# Patient Record
Sex: FEMALE | Race: WHITE | NOT HISPANIC OR LATINO | Employment: OTHER | URBAN - METROPOLITAN AREA
[De-identification: names, ages, dates, MRNs, and addresses within clinical notes are randomized per-mention and may not be internally consistent; named-entity substitution may affect disease eponyms.]

---

## 2017-01-19 RX ORDER — BUSPIRONE HYDROCHLORIDE 7.5 MG/1
TABLET ORAL
Qty: 60 TABLET | Refills: 6 | Status: SHIPPED | OUTPATIENT
Start: 2017-01-19 | End: 2017-11-05 | Stop reason: SDUPTHER

## 2017-02-16 ENCOUNTER — OFFICE VISIT (OUTPATIENT)
Dept: FAMILY MEDICINE CLINIC | Facility: CLINIC | Age: 57
End: 2017-02-16

## 2017-02-16 VITALS
DIASTOLIC BLOOD PRESSURE: 76 MMHG | HEIGHT: 60 IN | SYSTOLIC BLOOD PRESSURE: 144 MMHG | WEIGHT: 150 LBS | HEART RATE: 98 BPM | TEMPERATURE: 98.1 F | BODY MASS INDEX: 29.45 KG/M2 | OXYGEN SATURATION: 98 %

## 2017-02-16 DIAGNOSIS — E78.5 DYSLIPIDEMIA: ICD-10-CM

## 2017-02-16 DIAGNOSIS — I10 BENIGN ESSENTIAL HYPERTENSION: Primary | ICD-10-CM

## 2017-02-16 DIAGNOSIS — F41.9 ANXIETY: ICD-10-CM

## 2017-02-16 DIAGNOSIS — F41.0 PANIC DISORDER WITHOUT AGORAPHOBIA: ICD-10-CM

## 2017-02-16 PROCEDURE — 99214 OFFICE O/P EST MOD 30 MIN: CPT | Performed by: FAMILY MEDICINE

## 2017-02-16 NOTE — PROGRESS NOTES
Marek Park is a 56 y.o. female.   Chief Complaint   Patient presents with   • Follow-up     Patient states her ears keep popping    • Hypertension       Hypertension   This is a chronic problem. The current episode started more than 1 year ago. The problem is unchanged. The problem is controlled. Associated symptoms include anxiety. Pertinent negatives include no chest pain, headaches, orthopnea, palpitations, peripheral edema, PND or shortness of breath. There are no associated agents to hypertension. Risk factors for coronary artery disease include dyslipidemia. Past treatments include ACE inhibitors. The current treatment provides moderate improvement. There are no compliance problems.    Anxiety   Presents for follow-up visit. Symptoms include nervous/anxious behavior and panic. Patient reports no chest pain, dizziness, nausea, palpitations or shortness of breath. Symptoms occur occasionally. The severity of symptoms is mild. The quality of sleep is poor (Taking effexor at night). Nighttime awakenings: occasional.     Compliance with medications is %.   Hyperlipidemia   This is a chronic problem. The current episode started more than 1 year ago. The problem is controlled. Recent lipid tests were reviewed and are normal. There are no known factors aggravating her hyperlipidemia. Pertinent negatives include no chest pain, focal sensory loss, focal weakness, leg pain, myalgias or shortness of breath. She is currently on no antihyperlipidemic treatment. Improvement on treatment: due for recheck. There are no compliance problems.         The following portions of the patient's history were reviewed and updated as appropriate: allergies, current medications, past medical history and problem list.    Review of Systems   Constitutional: Unexpected weight change: Weight gain or loss.   Respiratory: Negative for shortness of breath and wheezing.    Cardiovascular: Negative for chest pain, palpitations,  "orthopnea, leg swelling and PND.        Had reproducible chest pain for 3 days few weeks ago, but resolved and has not recurred.   Gastrointestinal: Negative for abdominal pain, nausea and vomiting.   Musculoskeletal: Negative for myalgias.   Neurological: Negative for dizziness, tremors, focal weakness, syncope, facial asymmetry, speech difficulty, weakness, light-headedness, numbness and headaches.   Psychiatric/Behavioral: The patient is nervous/anxious.        Objective    Vitals:    02/16/17 1052   BP: 144/76   Pulse: 98   Temp: 98.1 °F (36.7 °C)   TempSrc: Oral   SpO2: 98%   Weight: 150 lb (68 kg)   Height: 60\" (152.4 cm)       Physical Exam   Constitutional: She appears well-developed and well-nourished.   HENT:   Head: Normocephalic and atraumatic.   Neck: Neck supple. No JVD present. No thyromegaly present.   Cardiovascular: Normal rate, regular rhythm and normal heart sounds.  Exam reveals no gallop and no friction rub.    No murmur heard.  Pulmonary/Chest: Effort normal and breath sounds normal. No respiratory distress. She has no wheezes. She has no rales.   Abdominal: Soft. Bowel sounds are normal. She exhibits no distension. There is no tenderness. There is no rebound and no guarding.   Musculoskeletal: She exhibits no edema.   Neurological: She is alert.   Skin: Skin is warm and dry.   Psychiatric: She has a normal mood and affect. Her behavior is normal.   Nursing note and vitals reviewed.      Assessment/Plan   Marizol was seen today for follow-up and hypertension.    Diagnoses and all orders for this visit:    Benign essential hypertension  -     Comprehensive Metabolic Panel    Dyslipidemia  -     Lipid Panel  -     Comprehensive Metabolic Panel    Anxiety    Panic disorder without agoraphobia    -hypertension - controlled, continue medications  -hld - recheck lipids  -try taking effexor in am        Current Outpatient Prescriptions:   •  busPIRone (BUSPAR) 7.5 MG tablet, TAKE 1 TABLET BY MOUTH TWICE " DAILY, Disp: 60 tablet, Rfl: 6  •  ibuprofen (ADVIL,MOTRIN) 600 MG tablet, Take 1 tablet by mouth every 6 (six) hours as needed for mild pain (1-3)., Disp: 90 tablet, Rfl: 1  •  lisinopril (PRINIVIL,ZESTRIL) 20 MG tablet, TAKE 1 TABLET DAILY FOR    BLOOD PRESSURE, Disp: 90 tablet, Rfl: 3  •  mesalamine (LIALDA) 1.2 G EC tablet, Take 4 tablets by mouth daily with dinner., Disp: , Rfl:   •  Nutritional Supplements (EQUATE PO), Take 1 tablet by mouth Daily., Disp: , Rfl:   •  omeprazole (priLOSEC) 40 MG capsule, Take 1 capsule by mouth 2 (Two) Times a Day., Disp: 180 capsule, Rfl: 1  •  venlafaxine XR (EFFEXOR-XR) 150 MG 24 hr capsule, Take 1 capsule by mouth Daily., Disp: 90 capsule, Rfl: 3  •  baclofen (LIORESAL) 10 MG tablet, TK 1 T PO Q 6 HOURS PRN FOR SPASMS, Disp: , Rfl: 0  •  budesonide EC (ENTOCORT EC) 3 MG 24 hr capsule, Take by mouth daily., Disp: , Rfl:   •  clotrimazole-betamethasone (LOTRISONE) 1-0.05 % cream, Apply  topically 2 (two) times a day., Disp: 45 g, Rfl: 1  •  Loratadine 10 MG capsule, Take by mouth., Disp: , Rfl:

## 2017-02-17 LAB
ALBUMIN SERPL-MCNC: 3.8 G/DL (ref 3.5–5.2)
ALBUMIN/GLOB SERPL: 1.3 G/DL
ALP SERPL-CCNC: 97 U/L (ref 39–117)
ALT SERPL-CCNC: 42 U/L (ref 1–33)
AST SERPL-CCNC: 38 U/L (ref 1–32)
BILIRUB SERPL-MCNC: 0.3 MG/DL (ref 0.1–1.2)
BUN SERPL-MCNC: 12 MG/DL (ref 6–20)
BUN/CREAT SERPL: 17.4 (ref 7–25)
CALCIUM SERPL-MCNC: 9.7 MG/DL (ref 8.6–10.5)
CHLORIDE SERPL-SCNC: 102 MMOL/L (ref 98–107)
CHOLEST SERPL-MCNC: 309 MG/DL (ref 0–200)
CO2 SERPL-SCNC: 26.2 MMOL/L (ref 22–29)
CREAT SERPL-MCNC: 0.69 MG/DL (ref 0.57–1)
GLOBULIN SER CALC-MCNC: 3 GM/DL
GLUCOSE SERPL-MCNC: 105 MG/DL (ref 65–99)
HDLC SERPL-MCNC: 35 MG/DL (ref 40–60)
LDLC SERPL CALC-MCNC: 206 MG/DL (ref 0–100)
POTASSIUM SERPL-SCNC: 4.2 MMOL/L (ref 3.5–5.2)
PROT SERPL-MCNC: 6.8 G/DL (ref 6–8.5)
SODIUM SERPL-SCNC: 141 MMOL/L (ref 136–145)
TRIGL SERPL-MCNC: 339 MG/DL (ref 0–150)
VLDLC SERPL CALC-MCNC: 67.8 MG/DL (ref 5–40)

## 2017-02-19 NOTE — PROGRESS NOTES
Please call the patient regarding her abnormal result.  Cholesterol very high, she should start cholesterol medication to prevent stroke and heart attack.  Has she tried atorvastatin?  I would suggest 80mg a day.  Rest of labs stable or normal.  Liver enzymes listed as high, but very close to normal.  Just need to monitor over time.

## 2017-02-21 ENCOUNTER — TELEPHONE (OUTPATIENT)
Dept: FAMILY MEDICINE CLINIC | Facility: CLINIC | Age: 57
End: 2017-02-21

## 2017-02-21 RX ORDER — ATORVASTATIN CALCIUM 80 MG/1
80 TABLET, FILM COATED ORAL DAILY
Qty: 1 TABLET | Refills: 0
Start: 2017-02-21 | End: 2017-03-27

## 2017-02-21 NOTE — PROGRESS NOTES
Pt informed of results. She said she already has rx of atorvastatin, and will start taking it again.

## 2017-03-27 ENCOUNTER — OFFICE VISIT (OUTPATIENT)
Dept: FAMILY MEDICINE CLINIC | Facility: CLINIC | Age: 57
End: 2017-03-27

## 2017-03-27 VITALS
HEART RATE: 93 BPM | DIASTOLIC BLOOD PRESSURE: 64 MMHG | SYSTOLIC BLOOD PRESSURE: 128 MMHG | WEIGHT: 141 LBS | HEIGHT: 60 IN | TEMPERATURE: 98.2 F | BODY MASS INDEX: 27.68 KG/M2 | OXYGEN SATURATION: 99 %

## 2017-03-27 DIAGNOSIS — L02.818 CUTANEOUS ABSCESS OF OTHER SITE: Primary | ICD-10-CM

## 2017-03-27 PROCEDURE — 99213 OFFICE O/P EST LOW 20 MIN: CPT | Performed by: NURSE PRACTITIONER

## 2017-03-27 RX ORDER — DOXYCYCLINE HYCLATE 100 MG/1
100 CAPSULE ORAL 2 TIMES DAILY
Qty: 14 CAPSULE | Refills: 0 | Status: SHIPPED | OUTPATIENT
Start: 2017-03-27 | End: 2017-04-10

## 2017-03-27 NOTE — PROGRESS NOTES
"Marek Park is a 56 y.o. female who presents with an abscess near vagina x 1 week.     History of Present Illness   HAD A CYST IN PAST THAT NEEDED DRAINED, WAS SEEN AT HOSPITAL IN THE PAST. This current cyst has been present for 4 days. Has manipulated heavily, and is much smaller today than yesterday. Sister has had similar in the past.   The following portions of the patient's history were reviewed and updated as appropriate: allergies, current medications, past family history, past medical history, past social history, past surgical history and problem list.    Review of Systems   Constitutional: Negative for chills and fever.   Genitourinary: Positive for genital sores. Negative for decreased urine volume and dysuria.        +fecal and urinary incontinence   Psychiatric/Behavioral: Negative.      /64  Pulse 93  Temp 98.2 °F (36.8 °C) (Oral)   Ht 60\" (152.4 cm)  Wt 141 lb (64 kg)  SpO2 99%  BMI 27.54 kg/m2    Objective   Physical Exam   Abdominal: There is no tenderness.   Genitourinary: There is tenderness and lesion (8mm soft superficial mobile cyst, no head) on the right labia. There is no rash on the right labia. There is no rash, tenderness, lesion or injury on the left labia.   Genitourinary Comments: Moist, wears pad daily   Lymphadenopathy:        Right: No inguinal adenopathy present.        Left: No inguinal adenopathy present.     Assessment/Plan   Problems Addressed this Visit     None      Visit Diagnoses     Cutaneous abscess of other site    -  Primary    Relevant Medications    doxycycline (VIBRAMYCIN) 100 MG capsule        Does have UC, this clear superficial skin abscess, as involuting, will simply start po antibiotics, FU if lesion worsening or not settling 1 week         "

## 2017-04-07 ENCOUNTER — TELEPHONE (OUTPATIENT)
Dept: FAMILY MEDICINE CLINIC | Facility: CLINIC | Age: 57
End: 2017-04-07

## 2017-04-10 ENCOUNTER — OFFICE VISIT (OUTPATIENT)
Dept: FAMILY MEDICINE CLINIC | Facility: CLINIC | Age: 57
End: 2017-04-10

## 2017-04-10 VITALS
DIASTOLIC BLOOD PRESSURE: 64 MMHG | HEIGHT: 60 IN | BODY MASS INDEX: 27.68 KG/M2 | HEART RATE: 104 BPM | OXYGEN SATURATION: 98 % | WEIGHT: 141 LBS | SYSTOLIC BLOOD PRESSURE: 118 MMHG | TEMPERATURE: 97.7 F

## 2017-04-10 DIAGNOSIS — L02.215 CUTANEOUS ABSCESS OF PERINEUM: Primary | ICD-10-CM

## 2017-04-10 PROCEDURE — 99213 OFFICE O/P EST LOW 20 MIN: CPT | Performed by: NURSE PRACTITIONER

## 2017-04-10 RX ORDER — OMEPRAZOLE 40 MG/1
CAPSULE, DELAYED RELEASE ORAL
Qty: 90 CAPSULE | Refills: 3 | Status: SHIPPED | OUTPATIENT
Start: 2017-04-10 | End: 2017-04-14 | Stop reason: SDUPTHER

## 2017-04-10 RX ORDER — SULFAMETHOXAZOLE AND TRIMETHOPRIM 800; 160 MG/1; MG/1
1 TABLET ORAL 2 TIMES DAILY
Qty: 14 TABLET | Refills: 0 | Status: SHIPPED | OUTPATIENT
Start: 2017-04-10 | End: 2017-07-20

## 2017-04-10 NOTE — PROGRESS NOTES
"Marek Park is a 56 y.o. female who presents for a follow up on abscess.     History of Present Illness   Seen 3/27 and Rx 7 days doxycycline. Cyst now improved mildly, but had a head yesterday. Using warm compresses  The following portions of the patient's history were reviewed and updated as appropriate: allergies, current medications, past family history, past medical history, past social history, past surgical history and problem list.    Review of Systems   Constitutional: Negative.    HENT: Negative.    Respiratory: Negative.  Negative for cough and wheezing.    Skin: Positive for wound.   Allergic/Immunologic: Negative for environmental allergies and food allergies.     /64  Pulse 104  Temp 97.7 °F (36.5 °C) (Oral)   Ht 60\" (152.4 cm)  Wt 141 lb (64 kg)  SpO2 98%  BMI 27.54 kg/m2    Objective   Physical Exam   Constitutional: She appears well-developed and well-nourished.   Cardiovascular: Normal rate, regular rhythm and normal heart sounds.    Pulmonary/Chest: Effort normal and breath sounds normal.   Genitourinary:         Genitourinary Comments: 4mm indurated area, no skin changes, no redness, no heat   Skin: Rash noted.     Assessment/Plan   Problems Addressed this Visit     None      Visit Diagnoses     Cutaneous abscess of perineum    -  Primary        Will retreat for infection, lesion tiny, no role for drainage, no obvious history of draining. Continue warm compresses and follow up if not settling 1 week.          "

## 2017-04-14 RX ORDER — OMEPRAZOLE 40 MG/1
40 CAPSULE, DELAYED RELEASE ORAL 2 TIMES DAILY
Qty: 180 CAPSULE | Refills: 1 | Status: SHIPPED | OUTPATIENT
Start: 2017-04-14 | End: 2017-09-26 | Stop reason: SDUPTHER

## 2017-04-24 ENCOUNTER — OFFICE (OUTPATIENT)
Dept: URBAN - METROPOLITAN AREA OTHER 6 | Facility: OTHER | Age: 57
End: 2017-04-24
Payer: COMMERCIAL

## 2017-04-24 VITALS
DIASTOLIC BLOOD PRESSURE: 70 MMHG | HEART RATE: 88 BPM | SYSTOLIC BLOOD PRESSURE: 130 MMHG | WEIGHT: 141 LBS | HEIGHT: 60 IN

## 2017-04-24 DIAGNOSIS — K51.50 LEFT SIDED COLITIS WITHOUT COMPLICATIONS: ICD-10-CM

## 2017-04-24 DIAGNOSIS — Z80.0 FAMILY HISTORY OF MALIGNANT NEOPLASM OF DIGESTIVE ORGANS: ICD-10-CM

## 2017-04-24 PROCEDURE — 99212 OFFICE O/P EST SF 10 MIN: CPT | Performed by: INTERNAL MEDICINE

## 2017-05-05 ENCOUNTER — OFFICE VISIT (OUTPATIENT)
Dept: FAMILY MEDICINE CLINIC | Facility: CLINIC | Age: 57
End: 2017-05-05

## 2017-05-05 VITALS
WEIGHT: 137 LBS | HEIGHT: 60 IN | OXYGEN SATURATION: 95 % | TEMPERATURE: 98.2 F | BODY MASS INDEX: 26.9 KG/M2 | DIASTOLIC BLOOD PRESSURE: 68 MMHG | HEART RATE: 114 BPM | SYSTOLIC BLOOD PRESSURE: 122 MMHG

## 2017-05-05 DIAGNOSIS — R68.83 CHILLS: Primary | ICD-10-CM

## 2017-05-05 DIAGNOSIS — R11.2 NAUSEA AND VOMITING, INTRACTABILITY OF VOMITING NOT SPECIFIED, UNSPECIFIED VOMITING TYPE: ICD-10-CM

## 2017-05-05 LAB
EXPIRATION DATE: NORMAL
FLUAV AG NPH QL: NEGATIVE
FLUBV AG NPH QL: NEGATIVE
INTERNAL CONTROL: NORMAL
Lab: NORMAL

## 2017-05-05 PROCEDURE — 99213 OFFICE O/P EST LOW 20 MIN: CPT | Performed by: NURSE PRACTITIONER

## 2017-05-05 PROCEDURE — 87804 INFLUENZA ASSAY W/OPTIC: CPT | Performed by: NURSE PRACTITIONER

## 2017-05-05 RX ORDER — ONDANSETRON 4 MG/1
4 TABLET, FILM COATED ORAL EVERY 8 HOURS PRN
Qty: 15 TABLET | Refills: 0 | Status: SHIPPED | OUTPATIENT
Start: 2017-05-05 | End: 2017-07-20

## 2017-05-22 ENCOUNTER — TELEPHONE (OUTPATIENT)
Dept: FAMILY MEDICINE CLINIC | Facility: CLINIC | Age: 57
End: 2017-05-22

## 2017-05-24 RX ORDER — VENLAFAXINE HYDROCHLORIDE 150 MG/1
150 TABLET, EXTENDED RELEASE ORAL DAILY
Qty: 30 EACH | Refills: 5 | Status: SHIPPED | OUTPATIENT
Start: 2017-05-24 | End: 2017-08-25 | Stop reason: SDUPTHER

## 2017-07-20 ENCOUNTER — OFFICE VISIT (OUTPATIENT)
Dept: FAMILY MEDICINE CLINIC | Facility: CLINIC | Age: 57
End: 2017-07-20

## 2017-07-20 VITALS
WEIGHT: 144 LBS | DIASTOLIC BLOOD PRESSURE: 78 MMHG | TEMPERATURE: 98.3 F | SYSTOLIC BLOOD PRESSURE: 118 MMHG | BODY MASS INDEX: 28.27 KG/M2 | HEART RATE: 108 BPM | HEIGHT: 60 IN | OXYGEN SATURATION: 98 %

## 2017-07-20 DIAGNOSIS — R00.0 TACHYCARDIA: ICD-10-CM

## 2017-07-20 DIAGNOSIS — E78.5 DYSLIPIDEMIA: ICD-10-CM

## 2017-07-20 DIAGNOSIS — R07.89 ATYPICAL CHEST PAIN: Primary | ICD-10-CM

## 2017-07-20 LAB
ALBUMIN SERPL-MCNC: 4.1 G/DL (ref 3.5–5.2)
ALBUMIN/GLOB SERPL: 1.5 G/DL
ALP SERPL-CCNC: 90 U/L (ref 39–117)
ALT SERPL-CCNC: 26 U/L (ref 1–33)
AST SERPL-CCNC: 19 U/L (ref 1–32)
BILIRUB SERPL-MCNC: <0.2 MG/DL (ref 0.1–1.2)
BUN SERPL-MCNC: 16 MG/DL (ref 6–20)
BUN/CREAT SERPL: 25.4 (ref 7–25)
CALCIUM SERPL-MCNC: 9.5 MG/DL (ref 8.6–10.5)
CHLORIDE SERPL-SCNC: 102 MMOL/L (ref 98–107)
CO2 SERPL-SCNC: 26.5 MMOL/L (ref 22–29)
CREAT SERPL-MCNC: 0.63 MG/DL (ref 0.57–1)
GLOBULIN SER CALC-MCNC: 2.8 GM/DL
GLUCOSE SERPL-MCNC: 107 MG/DL (ref 65–99)
POTASSIUM SERPL-SCNC: 4.2 MMOL/L (ref 3.5–5.2)
PROT SERPL-MCNC: 6.9 G/DL (ref 6–8.5)
SODIUM SERPL-SCNC: 143 MMOL/L (ref 136–145)
TSH SERPL DL<=0.005 MIU/L-ACNC: 1.13 MIU/ML (ref 0.27–4.2)

## 2017-07-20 PROCEDURE — 93000 ELECTROCARDIOGRAM COMPLETE: CPT | Performed by: NURSE PRACTITIONER

## 2017-07-20 PROCEDURE — 99214 OFFICE O/P EST MOD 30 MIN: CPT | Performed by: NURSE PRACTITIONER

## 2017-07-20 RX ORDER — ROSUVASTATIN CALCIUM 20 MG/1
20 TABLET, COATED ORAL DAILY
Qty: 30 TABLET | Refills: 5 | Status: ON HOLD | OUTPATIENT
Start: 2017-07-20 | End: 2017-12-12 | Stop reason: SINTOL

## 2017-07-20 NOTE — PROGRESS NOTES
Marek Park is a 56 y.o. female who presents for a follow up on three recent episodes of chest pain. First two episodes were resolved after taking tums, third lasted longer than usual. Also c/o night sweats. Recently weaned off prednisone.     Chest Pain    This is a recurrent problem. The current episode started 1 to 4 weeks ago. The onset quality is sudden. The problem occurs intermittently. The problem has been unchanged. The pain is present in the substernal region. The pain is at a severity of 6/10. The quality of the pain is described as squeezing. The pain does not radiate. Pertinent negatives include no abdominal pain, exertional chest pressure, fever, irregular heartbeat, lower extremity edema, nausea, near-syncope, palpitations, shortness of breath or vomiting. The pain is aggravated by nothing. She has tried nothing for the symptoms.   Pertinent negatives for family medical history include: no CAD. Prior diagnostic workup includes echocardiogram and stress thallium.      Episodes occurred after dinner, mid sternal squeezing, water makes it worse, then resolves. TUMS will allow to resolve as well. Last episode was longer, lasted 1 minute. Last episode occurred 4 days ago, none since. Episodes not associated with activity or SOA, No arm pain or jaw pain associated with chest pain. Normally no heartburn. Not currently on a statin, though told not to worry as on too many other medications. (total >300)    Off prednisone x 5 days. Weaned by GI due to recent flare of UC, now settled.     Did increase effexor about 6 weeks ago, did not help with chronic sweating.   The following portions of the patient's history were reviewed and updated as appropriate: allergies, current medications, past family history, past medical history, past social history, past surgical history and problem list.    Review of Systems   Constitutional: Negative for fever.   HENT: Negative.    Eyes: Negative.    Respiratory:  "Negative for shortness of breath.    Cardiovascular: Positive for chest pain. Negative for palpitations, leg swelling and near-syncope.   Gastrointestinal: Positive for diarrhea. Negative for abdominal pain, blood in stool, nausea and vomiting.   Endocrine: Positive for heat intolerance. Negative for cold intolerance, polydipsia, polyphagia and polyuria.   Musculoskeletal: Negative.    Skin: Negative.    Neurological: Negative.  Negative for light-headedness.   Psychiatric/Behavioral: The patient is nervous/anxious.      /78  Pulse 108  Temp 98.3 °F (36.8 °C) (Oral)   Ht 60\" (152.4 cm)  Wt 144 lb (65.3 kg)  SpO2 98%  BMI 28.12 kg/m2    Objective     Physical Exam   Constitutional: She appears well-developed and well-nourished.   Neck: Thyromegaly present.   Cardiovascular: Normal rate, regular rhythm and normal heart sounds.    No murmur heard.  No edema   Pulmonary/Chest: Effort normal and breath sounds normal.   Abdominal: Soft.   Lymphadenopathy:     She has no cervical adenopathy.   Skin: Skin is warm and dry.   Psychiatric: Her speech is normal and behavior is normal. Her mood appears anxious.   Nursing note and vitals reviewed.    Assessment/Plan   Problems Addressed this Visit     None      Visit Diagnoses     Atypical chest pain    -  Primary    Relevant Orders    Comprehensive Metabolic Panel    TSH    Ambulatory Referral to Cardiology    Tachycardia        Relevant Orders    TSH        Last workup in 2015, now with EKG changes, recommend repeat cardiology workup. As no current chest pain, no ischemia on EKG, reasonable for outpt workup. Recommend starting high potency statin for very high cholesterol levels. Consider US thyroid. FU pending cardiology workup and 1 month.     Consider other nonhormonal options for hot flashes.      ECG 12 Lead  Date/Time: 7/20/2017 11:44 AM  Performed by: RYDER WIGGINS  Authorized by: RYDER WIGGINS   Comparison: compared with previous ECG from " 6/19/2015  Comparison to previous ECG: New RBBB. Axis changes  Rhythm: sinus tachycardia  Rate: tachycardic  Conduction: right bundle branch block  ST Segments: ST segments normal  T depression: V1, V2 and V3  QRS axis: indeterminate  Q waves: I and aVL  Clinical impression: abnormal ECG

## 2017-07-21 ENCOUNTER — TELEPHONE (OUTPATIENT)
Dept: FAMILY MEDICINE CLINIC | Facility: CLINIC | Age: 57
End: 2017-07-21

## 2017-07-21 NOTE — TELEPHONE ENCOUNTER
----- Message from ELISABETH Garza sent at 7/21/2017  8:12 AM EDT -----  Normal lab results, normal thyroid.

## 2017-07-24 ENCOUNTER — TELEPHONE (OUTPATIENT)
Dept: FAMILY MEDICINE CLINIC | Facility: CLINIC | Age: 57
End: 2017-07-24

## 2017-07-25 NOTE — TELEPHONE ENCOUNTER
PT SAID [facility]STR[facility] IS FAXING OVER LAB ORDER AND SHE WILL COME DO THE URINE SAMPLE WHEN SHE COMES FOR LABS

## 2017-08-14 ENCOUNTER — OFFICE (OUTPATIENT)
Dept: URBAN - METROPOLITAN AREA OTHER 6 | Facility: OTHER | Age: 57
End: 2017-08-14
Payer: COMMERCIAL

## 2017-08-14 VITALS
HEART RATE: 96 BPM | WEIGHT: 142 LBS | DIASTOLIC BLOOD PRESSURE: 80 MMHG | HEIGHT: 60 IN | SYSTOLIC BLOOD PRESSURE: 132 MMHG

## 2017-08-14 DIAGNOSIS — K51.50 LEFT SIDED COLITIS WITHOUT COMPLICATIONS: ICD-10-CM

## 2017-08-14 DIAGNOSIS — Z80.0 FAMILY HISTORY OF MALIGNANT NEOPLASM OF DIGESTIVE ORGANS: ICD-10-CM

## 2017-08-14 PROCEDURE — 99212 OFFICE O/P EST SF 10 MIN: CPT | Performed by: INTERNAL MEDICINE

## 2017-08-17 ENCOUNTER — OFFICE VISIT (OUTPATIENT)
Dept: FAMILY MEDICINE CLINIC | Facility: CLINIC | Age: 57
End: 2017-08-17

## 2017-08-17 VITALS
HEART RATE: 89 BPM | OXYGEN SATURATION: 98 % | DIASTOLIC BLOOD PRESSURE: 80 MMHG | SYSTOLIC BLOOD PRESSURE: 128 MMHG | WEIGHT: 141 LBS | TEMPERATURE: 97.6 F | HEIGHT: 60 IN | BODY MASS INDEX: 27.68 KG/M2

## 2017-08-17 DIAGNOSIS — D64.9 ANEMIA, UNSPECIFIED TYPE: ICD-10-CM

## 2017-08-17 DIAGNOSIS — R41.3 MEMORY LOSS: ICD-10-CM

## 2017-08-17 DIAGNOSIS — E78.5 DYSLIPIDEMIA: ICD-10-CM

## 2017-08-17 DIAGNOSIS — R53.83 OTHER FATIGUE: ICD-10-CM

## 2017-08-17 DIAGNOSIS — I10 BENIGN ESSENTIAL HYPERTENSION: Primary | ICD-10-CM

## 2017-08-17 LAB
BASOPHILS # BLD AUTO: 0.02 10*3/MM3 (ref 0–0.2)
BASOPHILS NFR BLD AUTO: 0.3 % (ref 0–1.5)
BUN SERPL-MCNC: 16 MG/DL (ref 6–20)
BUN/CREAT SERPL: 23.5 (ref 7–25)
CALCIUM SERPL-MCNC: 9.6 MG/DL (ref 8.6–10.5)
CHLORIDE SERPL-SCNC: 105 MMOL/L (ref 98–107)
CHOLEST SERPL-MCNC: 234 MG/DL (ref 0–200)
CO2 SERPL-SCNC: 25.7 MMOL/L (ref 22–29)
CREAT SERPL-MCNC: 0.68 MG/DL (ref 0.57–1)
EOSINOPHIL # BLD AUTO: 0.2 10*3/MM3 (ref 0–0.7)
EOSINOPHIL NFR BLD AUTO: 2.7 % (ref 0.3–6.2)
ERYTHROCYTE [DISTWIDTH] IN BLOOD BY AUTOMATED COUNT: 16.5 % (ref 11.7–13)
FERRITIN SERPL-MCNC: 7.14 NG/ML (ref 13–150)
FOLATE SERPL-MCNC: >20 NG/ML (ref 4.78–24.2)
GLUCOSE SERPL-MCNC: 76 MG/DL (ref 65–99)
HCT VFR BLD AUTO: 36.6 % (ref 35.6–45.5)
HDLC SERPL-MCNC: 41 MG/DL (ref 40–60)
HGB BLD-MCNC: 10.1 G/DL (ref 11.9–15.5)
IMM GRANULOCYTES # BLD: 0 10*3/MM3 (ref 0–0.03)
IMM GRANULOCYTES NFR BLD: 0 % (ref 0–0.5)
IRON SATN MFR SERPL: 7 % (ref 20–50)
IRON SERPL-MCNC: 31 MCG/DL (ref 37–145)
LDLC SERPL CALC-MCNC: 119 MG/DL (ref 0–100)
LYMPHOCYTES # BLD AUTO: 2.25 10*3/MM3 (ref 0.9–4.8)
LYMPHOCYTES NFR BLD AUTO: 30.9 % (ref 19.6–45.3)
MCH RBC QN AUTO: 22.2 PG (ref 26.9–32)
MCHC RBC AUTO-ENTMCNC: 27.6 G/DL (ref 32.4–36.3)
MCV RBC AUTO: 80.4 FL (ref 80.5–98.2)
MONOCYTES # BLD AUTO: 0.69 10*3/MM3 (ref 0.2–1.2)
MONOCYTES NFR BLD AUTO: 9.5 % (ref 5–12)
NEUTROPHILS # BLD AUTO: 4.12 10*3/MM3 (ref 1.9–8.1)
NEUTROPHILS NFR BLD AUTO: 56.6 % (ref 42.7–76)
PLATELET # BLD AUTO: 387 10*3/MM3 (ref 140–500)
POTASSIUM SERPL-SCNC: 4.5 MMOL/L (ref 3.5–5.2)
RBC # BLD AUTO: 4.55 10*6/MM3 (ref 3.9–5.2)
SODIUM SERPL-SCNC: 144 MMOL/L (ref 136–145)
TIBC SERPL-MCNC: 414 MCG/DL
TRIGL SERPL-MCNC: 368 MG/DL (ref 0–150)
UIBC SERPL-MCNC: 383 MCG/DL
VIT B12 SERPL-MCNC: 604 PG/ML (ref 211–946)
VLDLC SERPL CALC-MCNC: 73.6 MG/DL (ref 5–40)
WBC # BLD AUTO: 7.28 10*3/MM3 (ref 4.5–10.7)

## 2017-08-17 PROCEDURE — 99214 OFFICE O/P EST MOD 30 MIN: CPT | Performed by: FAMILY MEDICINE

## 2017-08-17 NOTE — PROGRESS NOTES
Subjective   Marizol Park is a 56 y.o. female. Presents today for   Chief Complaint   Patient presents with   • Hypertension     pt here for 6 month med review and labs.    • Memory Loss     pt has been experiencing memory loss, she would like referral to psych       Hypertension   This is a chronic problem. The current episode started more than 1 year ago. The problem is unchanged. The problem is controlled. Associated symptoms include malaise/fatigue. Pertinent negatives include no chest pain, orthopnea, palpitations, peripheral edema, PND or shortness of breath. There are no associated agents to hypertension. Risk factors for coronary artery disease include dyslipidemia and post-menopausal state. Past treatments include ACE inhibitors. The current treatment provides moderate improvement. There is no history of CAD/MI or CVA.   Memory Loss   This is a chronic problem. The current episode started more than 1 month ago. The problem occurs constantly. The problem has been unchanged. Pertinent negatives include no abdominal pain, chest pain, chills, fever, nausea or vomiting. Associated symptoms comments: Losing items and doesn't remember where placed;  Is under a lot of social stress.    Had labs with jesenia, cmp normal, TSH normal.  CBC noted anemia (new from last check).  Patient hx of UC.. The symptoms are aggravated by stress. She has tried nothing for the symptoms. The treatment provided no relief.   Hyperlipidemia   This is a chronic problem. The current episode started more than 1 year ago. The problem is uncontrolled. Recent lipid tests were reviewed and are high. There are no known factors aggravating her hyperlipidemia. Pertinent negatives include no chest pain, focal sensory loss, leg pain or shortness of breath. Current antihyperlipidemic treatment includes statins (Last check LDL 200s;  had changed to Crestor.). Improvement on treatment: Due for recheck. Risk factors for coronary artery disease include  dyslipidemia, hypertension and post-menopausal.   Anemia   Presents for follow-up visit. Symptoms include malaise/fatigue. There has been no abdominal pain, bruising/bleeding easily, fever, light-headedness or palpitations. (Has had bloody diarrhea in past with UC.  ) Signs of blood loss that are present include hematochezia (Had 5 days, reports forgot lialda prior to starting;  On now consistently and stopped.). Signs of blood loss that are not present include melena. There are no compliance problems.  Compliance with medications is %.       Review of Systems   Constitutional: Positive for malaise/fatigue. Negative for chills and fever.   Respiratory: Negative for shortness of breath.    Cardiovascular: Negative for chest pain, palpitations, orthopnea and PND.   Gastrointestinal: Positive for anal bleeding, blood in stool and hematochezia (Had 5 days, reports forgot lialda prior to starting;  On now consistently and stopped.). Negative for abdominal pain, diarrhea, melena, nausea and vomiting.   Neurological: Negative for dizziness, syncope and light-headedness.   Hematological: Does not bruise/bleed easily.   Psychiatric/Behavioral: Positive for sleep disturbance.       The following portions of the patient's history were reviewed and updated as appropriate: allergies, current medications, past medical history and problem list.    Patient Active Problem List   Diagnosis   • Acute post-traumatic stress disorder   • Anxiety   • Benign essential hypertension   • Excessive cerumen in ear canal   • Chest wall pain   • Cough   • Dyslipidemia   • External hemorrhoids   • Fracture, thoracic vertebra, compression   • Gastroesophageal reflux disease   • Grief   • Hemorrhoids   • Night sweats   • Panic disorder without agoraphobia   • Peripheral neuropathy   • Skin lesion   • Ulcerative colitis   • Ulnar neuropathy       No Known Allergies    Current Outpatient Prescriptions on File Prior to Visit   Medication Sig  "Dispense Refill   • busPIRone (BUSPAR) 7.5 MG tablet TAKE 1 TABLET BY MOUTH TWICE DAILY 60 tablet 6   • lisinopril (PRINIVIL,ZESTRIL) 20 MG tablet TAKE 1 TABLET DAILY FOR    BLOOD PRESSURE 90 tablet 3   • Loratadine 10 MG capsule Take 10 mg by mouth Daily.     • mesalamine (LIALDA) 1.2 G EC tablet Take 4 tablets by mouth daily with dinner.     • omeprazole (priLOSEC) 40 MG capsule Take 1 capsule by mouth 2 (Two) Times a Day. 180 capsule 1   • rosuvastatin (CRESTOR) 20 MG tablet Take 1 tablet by mouth Daily. 30 tablet 5   • venlafaxine (EFFEXOR) 150 MG tablet sustained-release 24 hour 24 hr tablet Take 1 tablet by mouth Daily. 30 each 5   • [DISCONTINUED] ibuprofen (ADVIL,MOTRIN) 600 MG tablet Take 1 tablet by mouth every 6 (six) hours as needed for mild pain (1-3). 90 tablet 1     No current facility-administered medications on file prior to visit.        Objective   Vitals:    08/17/17 1059   BP: 128/80   BP Location: Left arm   Patient Position: Sitting   Cuff Size: Adult   Pulse: 89   Temp: 97.6 °F (36.4 °C)   TempSrc: Oral   SpO2: 98%   Weight: 141 lb (64 kg)   Height: 60\" (152.4 cm)       Physical Exam   Constitutional: She appears well-developed and well-nourished.   HENT:   Head: Normocephalic and atraumatic.   Neck: Neck supple. No JVD present. No thyromegaly present.   Cardiovascular: Normal rate, regular rhythm and normal heart sounds.  Exam reveals no gallop and no friction rub.    No murmur heard.  Pulmonary/Chest: Effort normal and breath sounds normal. No respiratory distress. She has no wheezes. She has no rales.   Abdominal: Soft. Bowel sounds are normal. She exhibits no distension. There is no tenderness. There is no rebound and no guarding.   Musculoskeletal: She exhibits no edema.   Neurological: She is alert.   Skin: Skin is warm and dry.   Psychiatric: She has a normal mood and affect. Her behavior is normal.   Nursing note and vitals reviewed.      Assessment/Plan   Marizol was seen today for " hypertension and memory loss.    Diagnoses and all orders for this visit:    Benign essential hypertension  -     Basic Metabolic Panel    Dyslipidemia  -     Lipid Panel    Memory loss  -     Vitamin B12    Other fatigue  -     CBC & Differential    Anemia, unspecified type  -     Basic Metabolic Panel  -     CBC & Differential  -     Ferritin  -     Iron Profile  -     Vitamin B12  -     Folate    -memory loss likely stress;  TSH normal;  Check b12;  If progresses, will perform mmse and evaluate further  -hypertension - controlled, continue medications  -HLD - continue statin, recheck lipids.  -anemia - check full anemia profile         -Follow up: 4 months       Current Outpatient Prescriptions:   •  busPIRone (BUSPAR) 7.5 MG tablet, TAKE 1 TABLET BY MOUTH TWICE DAILY, Disp: 60 tablet, Rfl: 6  •  lisinopril (PRINIVIL,ZESTRIL) 20 MG tablet, TAKE 1 TABLET DAILY FOR    BLOOD PRESSURE, Disp: 90 tablet, Rfl: 3  •  Loratadine 10 MG capsule, Take 10 mg by mouth Daily., Disp: , Rfl:   •  mesalamine (LIALDA) 1.2 G EC tablet, Take 4 tablets by mouth daily with dinner., Disp: , Rfl:   •  omeprazole (priLOSEC) 40 MG capsule, Take 1 capsule by mouth 2 (Two) Times a Day., Disp: 180 capsule, Rfl: 1  •  rosuvastatin (CRESTOR) 20 MG tablet, Take 1 tablet by mouth Daily., Disp: 30 tablet, Rfl: 5  •  venlafaxine (EFFEXOR) 150 MG tablet sustained-release 24 hour 24 hr tablet, Take 1 tablet by mouth Daily., Disp: 30 each, Rfl: 5

## 2017-08-18 RX ORDER — DOXYCYCLINE HYCLATE 50 MG/1
324 CAPSULE, GELATIN COATED ORAL
Qty: 60 TABLET | Refills: 2 | Status: ON HOLD | OUTPATIENT
Start: 2017-08-18 | End: 2017-12-12 | Stop reason: SINTOL

## 2017-08-18 NOTE — PROGRESS NOTES
Please call the patient regarding her abnormal result.  Patient is iron deficient anemic and why likely fatigued.  Start ferrous gluconate 324mg 1 po bid disp #60 2 ref;  Recheck CBC in 3 months diagnosis anemia.

## 2017-08-23 ENCOUNTER — TELEPHONE (OUTPATIENT)
Dept: FAMILY MEDICINE CLINIC | Facility: CLINIC | Age: 57
End: 2017-08-23

## 2017-08-25 ENCOUNTER — TELEPHONE (OUTPATIENT)
Dept: FAMILY MEDICINE CLINIC | Facility: CLINIC | Age: 57
End: 2017-08-25

## 2017-08-25 RX ORDER — VENLAFAXINE HYDROCHLORIDE 150 MG/1
150 TABLET, EXTENDED RELEASE ORAL DAILY
Qty: 90 EACH | Refills: 1 | Status: SHIPPED | OUTPATIENT
Start: 2017-08-25 | End: 2018-01-29 | Stop reason: SDUPTHER

## 2017-08-30 RX ORDER — LISINOPRIL 20 MG/1
TABLET ORAL
Qty: 90 TABLET | Refills: 3 | Status: SHIPPED | OUTPATIENT
Start: 2017-08-30 | End: 2018-02-05

## 2017-09-15 ENCOUNTER — TELEPHONE (OUTPATIENT)
Dept: FAMILY MEDICINE CLINIC | Facility: CLINIC | Age: 57
End: 2017-09-15

## 2017-09-18 NOTE — TELEPHONE ENCOUNTER
Pt trying again. jm  She was informed if causes nausea again she may try prenatal vits with iron or 2 flintstones vits daily. ron

## 2017-09-26 ENCOUNTER — TELEPHONE (OUTPATIENT)
Dept: FAMILY MEDICINE CLINIC | Facility: CLINIC | Age: 57
End: 2017-09-26

## 2017-09-26 RX ORDER — OMEPRAZOLE 40 MG/1
40 CAPSULE, DELAYED RELEASE ORAL 2 TIMES DAILY
Qty: 60 CAPSULE | Refills: 1 | Status: SHIPPED | OUTPATIENT
Start: 2017-09-26 | End: 2017-09-26 | Stop reason: SDUPTHER

## 2017-09-26 RX ORDER — OMEPRAZOLE 40 MG/1
40 CAPSULE, DELAYED RELEASE ORAL 2 TIMES DAILY
Qty: 180 CAPSULE | Refills: 1 | Status: SHIPPED | OUTPATIENT
Start: 2017-09-26 | End: 2018-04-02 | Stop reason: SDUPTHER

## 2017-11-06 RX ORDER — BUSPIRONE HYDROCHLORIDE 7.5 MG/1
TABLET ORAL
Qty: 60 TABLET | Refills: 6 | Status: SHIPPED | OUTPATIENT
Start: 2017-11-06 | End: 2018-01-29 | Stop reason: SDUPTHER

## 2017-11-21 RX ORDER — VENLAFAXINE HYDROCHLORIDE 150 MG/1
CAPSULE, EXTENDED RELEASE ORAL
Qty: 30 CAPSULE | Refills: 2 | Status: SHIPPED | OUTPATIENT
Start: 2017-11-21 | End: 2017-12-18

## 2017-12-12 ENCOUNTER — ANESTHESIA (OUTPATIENT)
Dept: GASTROENTEROLOGY | Facility: HOSPITAL | Age: 57
End: 2017-12-12

## 2017-12-12 ENCOUNTER — HOSPITAL ENCOUNTER (OUTPATIENT)
Facility: HOSPITAL | Age: 57
Setting detail: HOSPITAL OUTPATIENT SURGERY
Discharge: HOME | End: 2017-12-12
Attending: INTERNAL MEDICINE | Admitting: INTERNAL MEDICINE

## 2017-12-12 ENCOUNTER — ANESTHESIA EVENT (OUTPATIENT)
Dept: GASTROENTEROLOGY | Facility: HOSPITAL | Age: 57
End: 2017-12-12

## 2017-12-12 ENCOUNTER — ON CAMPUS - OUTPATIENT (OUTPATIENT)
Dept: URBAN - METROPOLITAN AREA HOSPITAL 114 | Facility: HOSPITAL | Age: 57
End: 2017-12-12
Payer: COMMERCIAL

## 2017-12-12 VITALS
SYSTOLIC BLOOD PRESSURE: 129 MMHG | RESPIRATION RATE: 20 BRPM | HEIGHT: 64 IN | DIASTOLIC BLOOD PRESSURE: 83 MMHG | HEART RATE: 76 BPM | WEIGHT: 134.5 LBS | OXYGEN SATURATION: 100 % | TEMPERATURE: 98.2 F | BODY MASS INDEX: 22.96 KG/M2

## 2017-12-12 DIAGNOSIS — R13.14 DYSPHAGIA, PHARYNGOESOPHAGEAL PHASE: ICD-10-CM

## 2017-12-12 DIAGNOSIS — R13.10 DYSPHAGIA: ICD-10-CM

## 2017-12-12 DIAGNOSIS — K44.9 DIAPHRAGMATIC HERNIA WITHOUT OBSTRUCTION OR GANGRENE: ICD-10-CM

## 2017-12-12 DIAGNOSIS — K22.2 ESOPHAGEAL OBSTRUCTION: ICD-10-CM

## 2017-12-12 DIAGNOSIS — K29.50 UNSPECIFIED CHRONIC GASTRITIS WITHOUT BLEEDING: ICD-10-CM

## 2017-12-12 DIAGNOSIS — R10.13 EPIGASTRIC PAIN: ICD-10-CM

## 2017-12-12 PROCEDURE — 88305 TISSUE EXAM BY PATHOLOGIST: CPT | Performed by: INTERNAL MEDICINE

## 2017-12-12 PROCEDURE — 43239 EGD BIOPSY SINGLE/MULTIPLE: CPT | Performed by: INTERNAL MEDICINE

## 2017-12-12 PROCEDURE — 88342 IMHCHEM/IMCYTCHM 1ST ANTB: CPT | Performed by: INTERNAL MEDICINE

## 2017-12-12 PROCEDURE — 88312 SPECIAL STAINS GROUP 1: CPT | Performed by: INTERNAL MEDICINE

## 2017-12-12 PROCEDURE — 25010000002 PROPOFOL 10 MG/ML EMULSION: Performed by: ANESTHESIOLOGY

## 2017-12-12 PROCEDURE — 43450 DILATE ESOPHAGUS 1/MULT PASS: CPT | Performed by: INTERNAL MEDICINE

## 2017-12-12 RX ORDER — PROPOFOL 10 MG/ML
VIAL (ML) INTRAVENOUS CONTINUOUS PRN
Status: DISCONTINUED | OUTPATIENT
Start: 2017-12-12 | End: 2017-12-12 | Stop reason: SURG

## 2017-12-12 RX ORDER — LIDOCAINE HYDROCHLORIDE 20 MG/ML
INJECTION, SOLUTION INFILTRATION; PERINEURAL AS NEEDED
Status: DISCONTINUED | OUTPATIENT
Start: 2017-12-12 | End: 2017-12-12 | Stop reason: SURG

## 2017-12-12 RX ORDER — PROPOFOL 10 MG/ML
VIAL (ML) INTRAVENOUS AS NEEDED
Status: DISCONTINUED | OUTPATIENT
Start: 2017-12-12 | End: 2017-12-12 | Stop reason: SURG

## 2017-12-12 RX ORDER — SODIUM CHLORIDE, SODIUM LACTATE, POTASSIUM CHLORIDE, CALCIUM CHLORIDE 600; 310; 30; 20 MG/100ML; MG/100ML; MG/100ML; MG/100ML
1000 INJECTION, SOLUTION INTRAVENOUS CONTINUOUS PRN
Status: DISCONTINUED | OUTPATIENT
Start: 2017-12-12 | End: 2017-12-12 | Stop reason: HOSPADM

## 2017-12-12 RX ORDER — LIDOCAINE HYDROCHLORIDE 10 MG/ML
0.5 INJECTION, SOLUTION INFILTRATION; PERINEURAL ONCE AS NEEDED
Status: DISCONTINUED | OUTPATIENT
Start: 2017-12-12 | End: 2017-12-12 | Stop reason: HOSPADM

## 2017-12-12 RX ORDER — SODIUM CHLORIDE 0.9 % (FLUSH) 0.9 %
3 SYRINGE (ML) INJECTION AS NEEDED
Status: DISCONTINUED | OUTPATIENT
Start: 2017-12-12 | End: 2017-12-12 | Stop reason: HOSPADM

## 2017-12-12 RX ADMIN — PROPOFOL 100 MG: 10 INJECTION, EMULSION INTRAVENOUS at 12:22

## 2017-12-12 RX ADMIN — LIDOCAINE HYDROCHLORIDE 30 MG: 20 INJECTION, SOLUTION INFILTRATION; PERINEURAL at 12:22

## 2017-12-12 RX ADMIN — PROPOFOL 300 MCG/KG/MIN: 10 INJECTION, EMULSION INTRAVENOUS at 12:22

## 2017-12-12 RX ADMIN — SODIUM CHLORIDE, POTASSIUM CHLORIDE, SODIUM LACTATE AND CALCIUM CHLORIDE: 600; 310; 30; 20 INJECTION, SOLUTION INTRAVENOUS at 12:18

## 2017-12-12 RX ADMIN — SODIUM CHLORIDE, POTASSIUM CHLORIDE, SODIUM LACTATE AND CALCIUM CHLORIDE 1000 ML: 600; 310; 30; 20 INJECTION, SOLUTION INTRAVENOUS at 12:14

## 2017-12-12 NOTE — ANESTHESIA POSTPROCEDURE EVALUATION
Patient: Marizol Park    Procedure Summary     Date Anesthesia Start Anesthesia Stop Room / Location    12/12/17 1218 1242  [facility] /  [facility]       Procedure Diagnosis Surgeon Provider    ESOPHAGOGASTRODUODENOSCOPY WITH BX AND PALACIO DILATION 53 [place] (N/A Esophagus) No diagnosis on file. MD Franko Fontanez MD          Anesthesia Type: MAC  Last vitals  BP   124/67 (12/12/17 1240)   Temp   36.8 °C (98.2 °F) (12/12/17 1250)   Pulse   92 (12/12/17 1240)   Resp   16 (12/12/17 1240)     SpO2   94 % (12/12/17 1240)     Post Anesthesia Care and Evaluation    Patient location during evaluation: PACU  Patient participation: complete - patient participated  Level of consciousness: awake  Pain management: adequate  Airway patency: patent  Anesthetic complications: No anesthetic complications  PONV Status: none  Cardiovascular status: acceptable  Respiratory status: acceptable  Hydration status: acceptable

## 2017-12-12 NOTE — PLAN OF CARE
Problem: GI Endoscopy (Adult)  Intervention: Monitor/Manage Procedure Recovery    12/12/17 1147   Respiratory Interventions   Airway/Ventilation Management airway patency maintained   Coping/Psychosocial Interventions   Environmental Support calm environment promoted   Activity   Activity Type activity adjusted per tolerance   Cardiac Interventions   Warming Thermoregulation Maintenance warm blankets applied         Goal: Signs and Symptoms of Listed Potential Problems Will be Absent or Manageable (GI Endoscopy)  Outcome: Ongoing (interventions implemented as appropriate)    12/12/17 1147   GI Endoscopy   Problems Assessed (GI Endoscopy) pain   Problems Present (GI Endoscopy) none

## 2017-12-12 NOTE — H&P
Patient Care Team:  ELISABETH Garza as PCP - General (Family Medicine)  ELISABETH Garza as PCP - Claims Attributed    Chief complaint DYSPHAGIA , ODONOPHAGIA    Subjective     History of Present Illness  Hx hx of colitis, presented to ER with painful swallowing and feeling of something stuck in the esophagus, being brought in for upper tract endoscopy recent rx for h pylori by a clinic for H pylori + serology   Review of Systems   Constitutional: Positive for unexpected weight change.   HENT: Positive for trouble swallowing.    Gastrointestinal: Positive for blood in stool and nausea.   All other systems reviewed and are negative.       Past Medical History:   Diagnosis Date   • Anxiety      History reviewed. No pertinent surgical history.  Family History   Problem Relation Age of Onset   • COPD Mother    • Other Father      brain tumor   • Cancer Other      malignant neoplasm   • Heart attack Other      Social History   Substance Use Topics   • Smoking status: Never Smoker   • Smokeless tobacco: Never Used   • Alcohol use Yes      Comment: occ     Prescriptions Prior to Admission   Medication Sig Dispense Refill Last Dose   • busPIRone (BUSPAR) 7.5 MG tablet TAKE 1 TABLET BY MOUTH TWICE DAILY 60 tablet 6 12/11/2017 at Unknown time   • lisinopril (PRINIVIL,ZESTRIL) 20 MG tablet TAKE 1 TABLET DAILY FOR    BLOOD PRESSURE 90 tablet 3 12/11/2017 at Unknown time   • Loratadine 10 MG capsule Take 10 mg by mouth Daily.   12/11/2017 at Unknown time   • mesalamine (LIALDA) 1.2 G EC tablet Take 4 tablets by mouth daily with dinner.   12/11/2017 at Unknown time   • omeprazole (priLOSEC) 40 MG capsule Take 1 capsule by mouth 2 (Two) Times a Day. 180 capsule 1 12/11/2017 at Unknown time   • venlafaxine (EFFEXOR) 150 MG tablet sustained-release 24 hour 24 hr tablet Take 1 tablet by mouth Daily. 90 each 1 12/11/2017 at Unknown time   • venlafaxine XR (EFFEXOR-XR) 150 MG 24 hr capsule TAKE ONE CAPSULE BY MOUTH  EVERY DAY 30 capsule 2      Allergies:  Review of patient's allergies indicates no known allergies.    Objective      Vital Signs  Temp:  [98.4 °F (36.9 °C)] 98.4 °F (36.9 °C)  Heart Rate:  [75] 75  Resp:  [16] 16  BP: (133)/(76) 133/76    Physical Exam   Constitutional: She is oriented to person, place, and time. She appears well-developed and well-nourished.   HENT:   Mouth/Throat: Oropharynx is clear and moist.   Eyes: Conjunctivae are normal.   Neck: Neck supple.   Cardiovascular: Normal rate.    Pulmonary/Chest: Effort normal.   Abdominal: Soft.   Neurological: She is alert and oriented to person, place, and time.   Skin: Skin is warm and dry.   Psychiatric: She has a normal mood and affect.       Results Review:   I reviewed the patient's new clinical results.      Assessment/Plan     Active Problems:    * No active hospital problems. *      Assessment:  (Odonophagia  Dyspepsia  h pylori  Dysphagia ).     Plan:   (Upper tract endoscopy, risks, alternatives and benefits discussed with patient and patient is agreeable to proceed).       I discussed the patients findings and my recommendations with patient and nursing staff    Randal Demarco MD  12/12/17  12:21 PM

## 2017-12-12 NOTE — ANESTHESIA PREPROCEDURE EVALUATION
Anesthesia Evaluation     Patient summary reviewed and Nursing notes reviewed          Airway   Mallampati: II  TM distance: >3 FB  Neck ROM: full  no difficulty expected  Dental - normal exam     Pulmonary - negative pulmonary ROS and normal exam   Cardiovascular - normal exam    (+) hypertension,       Neuro/Psych  (+) numbness, psychiatric history Anxiety,      ROS Comment: Panic disorder/peripheral neuropathy/ulnar neuropathy//PTSD  GI/Hepatic/Renal/Endo    (+)  GERD,     Musculoskeletal     (+) back pain,   Abdominal  - normal exam   Substance History - negative use     OB/GYN negative ob/gyn ROS         Other                                      Anesthesia Plan    ASA 2     MAC     intravenous induction   Anesthetic plan and risks discussed with patient.

## 2017-12-14 LAB
CYTO UR: NORMAL
LAB AP CASE REPORT: NORMAL
Lab: NORMAL
PATH REPORT.ADDENDUM SPEC: NORMAL
PATH REPORT.FINAL DX SPEC: NORMAL
PATH REPORT.GROSS SPEC: NORMAL

## 2017-12-18 ENCOUNTER — OFFICE VISIT (OUTPATIENT)
Dept: FAMILY MEDICINE CLINIC | Facility: CLINIC | Age: 57
End: 2017-12-18

## 2017-12-18 VITALS
HEART RATE: 93 BPM | TEMPERATURE: 98.2 F | BODY MASS INDEX: 23.05 KG/M2 | HEIGHT: 64 IN | DIASTOLIC BLOOD PRESSURE: 66 MMHG | OXYGEN SATURATION: 99 % | SYSTOLIC BLOOD PRESSURE: 128 MMHG | WEIGHT: 135 LBS

## 2017-12-18 DIAGNOSIS — I10 BENIGN ESSENTIAL HYPERTENSION: Primary | ICD-10-CM

## 2017-12-18 DIAGNOSIS — Z12.39 BREAST CANCER SCREENING: ICD-10-CM

## 2017-12-18 PROCEDURE — 99213 OFFICE O/P EST LOW 20 MIN: CPT | Performed by: NURSE PRACTITIONER

## 2017-12-18 NOTE — PROGRESS NOTES
"Marek Park is a 57 y.o. female who presents for a follow up on hypertension.     History of Present Illness   Reports abdominal pain, x 2 ER visits, attributed to amoxil, medication was making her sick (meds for h pylori), probiotics x few days now with less stomach pain. Still taking omeprazole. Swallowing is some improved since dilation. Still having rectal bleeding, Makk, thinking about putting back on prednisone. Diarrhea now resolved x 2 days. Working with GI on this.     HTN overall well controlled. Does not check at home.   The following portions of the patient's history were reviewed and updated as appropriate: allergies, current medications, past family history, past medical history, past social history, past surgical history and problem list.    Review of Systems   Constitutional: Negative.  Negative for activity change, fever and unexpected weight change.   HENT: Negative.    Eyes: Negative.  Negative for visual disturbance.   Respiratory: Negative.    Cardiovascular: Negative.  Negative for chest pain.   Gastrointestinal: Negative.  Negative for constipation and diarrhea.   Endocrine: Negative.    Genitourinary: Negative for difficulty urinating and frequency.   Musculoskeletal: Negative.    Neurological: Negative for weakness and headaches.   Hematological: Negative.    Psychiatric/Behavioral: Negative.  Negative for dysphoric mood.     /66  Pulse 93  Temp 98.2 °F (36.8 °C) (Oral)   Ht 162.6 cm (64\")  Wt 61.2 kg (135 lb)  SpO2 99%  BMI 23.17 kg/m2    Objective   Physical Exam   Constitutional: She appears well-developed and well-nourished.   HENT:   Head: Normocephalic and atraumatic.   Right Ear: Tympanic membrane normal.   Left Ear: Tympanic membrane normal.   Nose: Nose normal.   Neck: Normal range of motion. Neck supple. No thyromegaly present.   Cardiovascular: Normal rate, regular rhythm and normal heart sounds.    Pulses:       Carotid pulses are 2+ on the right side, " and 2+ on the left side.  Pulmonary/Chest: Effort normal and breath sounds normal.   Lymphadenopathy:     She has no cervical adenopathy.   Skin: She is not diaphoretic.   Psychiatric: She has a normal mood and affect. Her behavior is normal. Judgment and thought content normal.   Nursing note and vitals reviewed.    Assessment/Plan   Marizol was seen today for follow-up.    Diagnoses and all orders for this visit:    Benign essential hypertension    Breast cancer screening  -     Mammo Screening Bilateral With CAD; Future    Labs acceptable except iron deficiency anemia in August. Has recently undergone CT x 2 for abdominal pain, upper endoscopy for difficulty swallowing, with Shatzki ring, dilated. Anemia improved on 12/10 labs at [facility].   12.6   39.5   Continue MVI for iron supplementation. Continue work on GI with Dr. Demarco, consider checking h pylori for clearing in 3-4 weeks. Pt agrees.   Recommend Flu vaccine, pt declines until after Bony.   Schedule screening mammogram, sister with breast cancer.

## 2018-01-03 ENCOUNTER — TELEPHONE (OUTPATIENT)
Dept: FAMILY MEDICINE CLINIC | Facility: CLINIC | Age: 58
End: 2018-01-03

## 2018-01-10 ENCOUNTER — OFFICE (OUTPATIENT)
Dept: URBAN - METROPOLITAN AREA OTHER 6 | Facility: OTHER | Age: 58
End: 2018-01-10

## 2018-01-10 VITALS
DIASTOLIC BLOOD PRESSURE: 70 MMHG | HEIGHT: 60 IN | HEART RATE: 88 BPM | SYSTOLIC BLOOD PRESSURE: 130 MMHG | WEIGHT: 139 LBS

## 2018-01-10 DIAGNOSIS — Z80.0 FAMILY HISTORY OF MALIGNANT NEOPLASM OF DIGESTIVE ORGANS: ICD-10-CM

## 2018-01-10 DIAGNOSIS — K57.30 DIVERTICULOSIS OF LARGE INTESTINE WITHOUT PERFORATION OR ABS: ICD-10-CM

## 2018-01-10 DIAGNOSIS — K62.5 HEMORRHAGE OF ANUS AND RECTUM: ICD-10-CM

## 2018-01-10 DIAGNOSIS — K51.50 LEFT SIDED COLITIS WITHOUT COMPLICATIONS: ICD-10-CM

## 2018-01-10 PROCEDURE — 99213 OFFICE O/P EST LOW 20 MIN: CPT | Performed by: INTERNAL MEDICINE

## 2018-01-17 ENCOUNTER — OFFICE VISIT (OUTPATIENT)
Dept: FAMILY MEDICINE CLINIC | Facility: CLINIC | Age: 58
End: 2018-01-17

## 2018-01-17 VITALS
OXYGEN SATURATION: 97 % | SYSTOLIC BLOOD PRESSURE: 122 MMHG | DIASTOLIC BLOOD PRESSURE: 64 MMHG | WEIGHT: 138 LBS | BODY MASS INDEX: 23.56 KG/M2 | HEART RATE: 97 BPM | HEIGHT: 64 IN | TEMPERATURE: 98.2 F

## 2018-01-17 DIAGNOSIS — J06.9 ACUTE URI: Primary | ICD-10-CM

## 2018-01-17 PROCEDURE — 99213 OFFICE O/P EST LOW 20 MIN: CPT | Performed by: NURSE PRACTITIONER

## 2018-01-17 NOTE — PROGRESS NOTES
"Marek Park is a 57 y.o. female who presents c/o sore throat, congestion, cough x 2 days.     History of Present Illness   Sore throat, cough. Sinus pain x 2 days, green productive cough. Nasal discharge -green, denies fever, denies recent contact with anyone with flu/uri. Cough med/throat spray have been controlling symptoms.   The following portions of the patient's history were reviewed and updated as appropriate: allergies, current medications, past family history, past medical history, past social history, past surgical history and problem list.    Review of Systems   HENT: Positive for congestion, rhinorrhea (green with blowing), sinus pain, sinus pressure and sore throat.    Respiratory: Positive for cough and chest tightness (yesterday-anxiety/indigestions--TUMS resolved).    Musculoskeletal:        Denies body aches except soreness from recent fall     /64  Pulse 97  Temp 98.2 °F (36.8 °C) (Oral)   Ht 162.6 cm (64\")  Wt 62.6 kg (138 lb)  SpO2 97%  BMI 23.69 kg/m2    Objective   Physical Exam   Constitutional: She appears well-developed and well-nourished.   HENT:   Right Ear: Tympanic membrane, external ear and ear canal normal.   Left Ear: Tympanic membrane, external ear and ear canal normal.   Nose: Right sinus exhibits maxillary sinus tenderness. Right sinus exhibits no frontal sinus tenderness. Left sinus exhibits maxillary sinus tenderness. Left sinus exhibits no frontal sinus tenderness.   Mouth/Throat: Uvula is midline and mucous membranes are normal. Posterior oropharyngeal erythema present.   Cerumen R>L   Eyes: Conjunctivae are normal.   Neck: Neck supple.   Cardiovascular: Normal rate, regular rhythm and normal heart sounds.    Pulmonary/Chest: Effort normal and breath sounds normal.   Skin: Skin is warm and dry.   Psychiatric: She has a normal mood and affect.     Assessment/Plan   Problems Addressed this Visit     None      Visit Diagnoses     Acute URI    -  Primary    "   Upper Respiratory Infection.  Continue symptom management with cough med/.throat spray as prior.  Return to office if symptoms worsen or increased temperature. If acute stressors not settling down consider change or increase of depression medications.

## 2018-01-21 ENCOUNTER — INPATIENT HOSPITAL (OUTPATIENT)
Dept: URBAN - METROPOLITAN AREA HOSPITAL 107 | Facility: HOSPITAL | Age: 58
End: 2018-01-21

## 2018-01-21 DIAGNOSIS — K92.1 MELENA: ICD-10-CM

## 2018-01-21 DIAGNOSIS — K51.50 LEFT SIDED COLITIS WITHOUT COMPLICATIONS: ICD-10-CM

## 2018-01-21 PROCEDURE — 99254 IP/OBS CNSLTJ NEW/EST MOD 60: CPT | Performed by: INTERNAL MEDICINE

## 2018-01-22 ENCOUNTER — INPATIENT HOSPITAL (OUTPATIENT)
Dept: URBAN - METROPOLITAN AREA HOSPITAL 107 | Facility: HOSPITAL | Age: 58
End: 2018-01-22

## 2018-01-22 DIAGNOSIS — R19.7 DIARRHEA, UNSPECIFIED: ICD-10-CM

## 2018-01-22 DIAGNOSIS — K92.1 MELENA: ICD-10-CM

## 2018-01-22 DIAGNOSIS — D64.9 ANEMIA, UNSPECIFIED: ICD-10-CM

## 2018-01-22 DIAGNOSIS — K51.80 OTHER ULCERATIVE COLITIS WITHOUT COMPLICATIONS: ICD-10-CM

## 2018-01-22 DIAGNOSIS — K52.9 NONINFECTIVE GASTROENTERITIS AND COLITIS, UNSPECIFIED: ICD-10-CM

## 2018-01-22 PROCEDURE — 99231 SBSQ HOSP IP/OBS SF/LOW 25: CPT | Performed by: PHYSICIAN ASSISTANT

## 2018-01-26 ENCOUNTER — TELEPHONE (OUTPATIENT)
Dept: FAMILY MEDICINE CLINIC | Facility: CLINIC | Age: 58
End: 2018-01-26

## 2018-01-26 NOTE — TELEPHONE ENCOUNTER
Per Jen, pt is to con't taking her meds, keep her appt with Dr. Mcconnell and if BP gets any higher she is call back.

## 2018-01-29 ENCOUNTER — OFFICE VISIT (OUTPATIENT)
Dept: FAMILY MEDICINE CLINIC | Facility: CLINIC | Age: 58
End: 2018-01-29

## 2018-01-29 VITALS
TEMPERATURE: 98.8 F | HEIGHT: 64 IN | DIASTOLIC BLOOD PRESSURE: 78 MMHG | OXYGEN SATURATION: 98 % | BODY MASS INDEX: 24.59 KG/M2 | HEART RATE: 108 BPM | WEIGHT: 144 LBS | SYSTOLIC BLOOD PRESSURE: 140 MMHG

## 2018-01-29 DIAGNOSIS — F41.9 ANXIETY: ICD-10-CM

## 2018-01-29 DIAGNOSIS — K51.211 ULCERATIVE PROCTITIS WITH RECTAL BLEEDING (HCC): ICD-10-CM

## 2018-01-29 DIAGNOSIS — E78.5 DYSLIPIDEMIA: ICD-10-CM

## 2018-01-29 DIAGNOSIS — D62 ACUTE BLOOD LOSS ANEMIA: ICD-10-CM

## 2018-01-29 DIAGNOSIS — I10 BENIGN ESSENTIAL HYPERTENSION: Primary | ICD-10-CM

## 2018-01-29 PROBLEM — K62.5 PROCTOCOLITIS WITH RECTAL BLEEDING: Status: ACTIVE | Noted: 2018-01-21

## 2018-01-29 PROBLEM — D84.9 IMMUNOCOMPROMISED STATE: Status: ACTIVE | Noted: 2018-01-21

## 2018-01-29 PROBLEM — K52.9 PROCTOCOLITIS WITH RECTAL BLEEDING: Status: ACTIVE | Noted: 2018-01-21

## 2018-01-29 LAB
HCT VFR BLDA CALC: 36.8 %
HGB BLDA-MCNC: 12 G/DL
MCH, POC: 28.7
MCHC, POC: 32.6
MCV, POC: 88
PLATELET # BLD AUTO: 451 10*3/MM3
PMV BLD: 7.8 FL
RBC, POC: 4.18
RDW, POC: 16.1
WBC # BLD: 13.9 10*3/UL

## 2018-01-29 PROCEDURE — 85027 COMPLETE CBC AUTOMATED: CPT | Performed by: FAMILY MEDICINE

## 2018-01-29 PROCEDURE — 99214 OFFICE O/P EST MOD 30 MIN: CPT | Performed by: FAMILY MEDICINE

## 2018-01-29 RX ORDER — VENLAFAXINE HYDROCHLORIDE 150 MG/1
150 TABLET, EXTENDED RELEASE ORAL DAILY
Qty: 90 EACH | Refills: 1 | Status: SHIPPED | OUTPATIENT
Start: 2018-01-29 | End: 2018-09-19 | Stop reason: SDUPTHER

## 2018-01-29 RX ORDER — PREDNISONE 10 MG/1
TABLET ORAL
COMMUNITY
Start: 2018-01-23 | End: 2018-05-21

## 2018-01-29 RX ORDER — BUSPIRONE HYDROCHLORIDE 7.5 MG/1
7.5 TABLET ORAL 2 TIMES DAILY
Qty: 60 TABLET | Refills: 6 | Status: SHIPPED | OUTPATIENT
Start: 2018-01-29 | End: 2018-05-21 | Stop reason: SDUPTHER

## 2018-01-29 NOTE — PROGRESS NOTES
Subjective   Marizol Park is a 57 y.o. female. Presents today for   Chief Complaint   Patient presents with   • Ulcerative Colitis     pt here for hospital follow up. she was in [facility].   • Hypertension     pt here for 6 month med review and labs.   • Anxiety     pt said hospital took her off her buspar and effexor, she is having bad anxiety and wants to go back on them.       Patient here for hospital f/u  Admit 1/20/18  D/C 1/23/18    58 y/o female presented to [facility] with 1 week priro hx of rectal pain and bleeding with hx of UC.  Failed out patient steroids.  Patient received 2 units PRBCs while inpatient, CT noted rectosigmoid thickening.  Was d/c on 5 days of cipro/flagyl and tapering dose of steroid with recommended f/u with primary GI.  D/c Hgb 11.2;  D/c Cr 0.7; Hit low of 8.1    Patient also here for her chronic disease mgmt as due.    Rectal Bleeding   This is a recurrent problem. The current episode started 1 to 4 weeks ago. The problem occurs constantly. The problem has been resolved. Pertinent negatives include no abdominal pain, chest pain, fever, nausea or vomiting. Exacerbated by: hx of UC. Treatments tried: s/p d/c;  on steroid taper. The treatment provided significant relief.   Anxiety   Presents for follow-up visit. Symptoms include excessive worry, muscle tension, nervous/anxious behavior and panic. Patient reports no chest pain, nausea, palpitations or shortness of breath. Primary symptoms comment: hot flashes and night sweats, stopped buspar and effexor as directed, but not tolerating well.. Symptoms occur constantly. The severity of symptoms is moderate. The quality of sleep is poor. Nighttime awakenings: several.     Compliance with medications is %.   Hypertension   This is a chronic problem. The current episode started more than 1 year ago. The problem is unchanged. The problem is controlled. Associated symptoms include anxiety. Pertinent negatives include no chest pain,  orthopnea, palpitations, peripheral edema, PND or shortness of breath. There are no associated agents to hypertension. Risk factors for coronary artery disease include dyslipidemia and post-menopausal state. Past treatments include ACE inhibitors. The current treatment provides moderate improvement. There are no compliance problems.  There is no history of kidney disease, CAD/MI, CVA or heart failure. There is no history of chronic renal disease.   hx of hld, due for full labs.    Mom passed away.    Review of Systems   Constitutional: Negative for fever.   Respiratory: Negative for shortness of breath.    Cardiovascular: Negative for chest pain, palpitations, orthopnea and PND.   Gastrointestinal: Positive for hematochezia. Negative for abdominal pain, nausea and vomiting.   Psychiatric/Behavioral: The patient is nervous/anxious.        The following portions of the patient's history were reviewed and updated as appropriate: allergies, current medications, past medical history and problem list.    Patient Active Problem List   Diagnosis   • Acute post-traumatic stress disorder   • Anxiety   • Benign essential hypertension   • Excessive cerumen in ear canal   • Chest wall pain   • Cough   • Dyslipidemia   • External hemorrhoids   • Fracture, thoracic vertebra, compression   • Gastroesophageal reflux disease   • Grief   • Hemorrhoids   • Night sweats   • Panic disorder without agoraphobia   • Peripheral neuropathy   • Skin lesion   • Ulcerative colitis   • Ulnar neuropathy   • Acute blood loss anemia   • Immunocompromised state   • Proctocolitis with rectal bleeding       No Known Allergies    Current Outpatient Prescriptions on File Prior to Visit   Medication Sig Dispense Refill   • lisinopril (PRINIVIL,ZESTRIL) 20 MG tablet TAKE 1 TABLET DAILY FOR    BLOOD PRESSURE 90 tablet 3   • Loratadine 10 MG capsule Take 10 mg by mouth Daily.     • mesalamine (LIALDA) 1.2 G EC tablet Take 4 tablets by mouth daily with  "dinner.     • omeprazole (priLOSEC) 40 MG capsule Take 1 capsule by mouth 2 (Two) Times a Day. 180 capsule 1   • busPIRone (BUSPAR) 7.5 MG tablet TAKE 1 TABLET BY MOUTH TWICE DAILY 60 tablet 6   • Probiotic Product (PROBIOTIC ADVANCED PO) Take  by mouth.     • venlafaxine (EFFEXOR) 150 MG tablet sustained-release 24 hour 24 hr tablet Take 1 tablet by mouth Daily. 90 each 1   • [DISCONTINUED] aspirin 81 MG tablet Take 81 mg by mouth Daily.     • [DISCONTINUED] Pediatric Multiple Vit-C-FA (FLINSTONES GUMMIES OMEGA-3 DHA PO) Take  by mouth.       No current facility-administered medications on file prior to visit.        Objective   Vitals:    01/29/18 1403   BP: 140/78   BP Location: Right arm   Patient Position: Sitting   Cuff Size: Adult   Pulse: 108   Temp: 98.8 °F (37.1 °C)   TempSrc: Oral   SpO2: 98%   Weight: 65.3 kg (144 lb)   Height: 162.6 cm (64.02\")       Physical Exam   Constitutional: She appears well-developed and well-nourished.   HENT:   Head: Normocephalic and atraumatic.   Neck: Neck supple. No JVD present. No thyromegaly present.   Cardiovascular: Normal rate, regular rhythm and normal heart sounds.  Exam reveals no gallop and no friction rub.    No murmur heard.  Pulmonary/Chest: Effort normal and breath sounds normal. No respiratory distress. She has no wheezes. She has no rales.   Abdominal: Soft. Bowel sounds are normal. She exhibits no distension. There is no tenderness. There is no rebound and no guarding.   Musculoskeletal: She exhibits no edema.   Neurological: She is alert.   Skin: Skin is warm and dry.   Psychiatric: She has a normal mood and affect. Her behavior is normal.   Nursing note and vitals reviewed.    CBC ordered and reviewed.  POC CBC   Order: [id number]   Status:  Final result   Visible to patient:  No (Not Released) Dx:  Benign essential hypertension; Anxiet...      Ref Range & Units 4:38 PM     Hematocrit % 36.8   Hemoglobin g/dL 12.0   RBC  4.18   WBC  13.9   MCV  88.0   MCH  " 28.7   MCHC  32.6   RDW-CV  16.1   Platelets 10*3/mm3 451   MPV  7.8   Resulting Agency               Assessment/Plan   Marizol was seen today for ulcerative colitis, hypertension and anxiety.    Diagnoses and all orders for this visit:    Benign essential hypertension  -     Comprehensive Metabolic Panel  -     Lipid Panel  -     POC CBC    Ulcerative proctitis with rectal bleeding  -     Comprehensive Metabolic Panel  -     Lipid Panel  -     POC CBC    Dyslipidemia  -     Comprehensive Metabolic Panel  -     Lipid Panel  -     POC CBC    Anxiety  -     Comprehensive Metabolic Panel  -     Lipid Panel  -     POC CBC  -     venlafaxine (EFFEXOR) 150 MG tablet sustained-release 24 hour 24 hr tablet; Take 1 tablet by mouth Daily.  -     busPIRone (BUSPAR) 7.5 MG tablet; Take 1 tablet by mouth 2 (Two) Times a Day.    Acute blood loss anemia    -ok restart effexor and buspar as off only short period and intolerant of just stopping; d/w could consider weaning, but would wean off  -UC - recurrent episode, f/u with GI as directed;  Completed abx; no further bloody diarrhea per aptient  -hypertension - controlled, continue medications  -hld - recheck lipids         -Follow up: 4 months       Current Outpatient Prescriptions:   •  lisinopril (PRINIVIL,ZESTRIL) 20 MG tablet, TAKE 1 TABLET DAILY FOR    BLOOD PRESSURE, Disp: 90 tablet, Rfl: 3  •  Loratadine 10 MG capsule, Take 10 mg by mouth Daily., Disp: , Rfl:   •  mesalamine (LIALDA) 1.2 G EC tablet, Take 4 tablets by mouth daily with dinner., Disp: , Rfl:   •  omeprazole (priLOSEC) 40 MG capsule, Take 1 capsule by mouth 2 (Two) Times a Day., Disp: 180 capsule, Rfl: 1  •  Pediatric Multivitamins-Iron (FLINTSTONES PLUS IRON PO), Take  by mouth., Disp: , Rfl:   •  predniSONE (DELTASONE) 10 MG tablet, 40mg po daily x 7 days, then 30mg po daily x 7 days, then 20mg po daily x 7 days, then 10mg po daily x 7 days, then  STOP, Disp: , Rfl:   •  busPIRone (BUSPAR) 7.5 MG tablet, TAKE 1  TABLET BY MOUTH TWICE DAILY, Disp: 60 tablet, Rfl: 6  •  Probiotic Product (PROBIOTIC ADVANCED PO), Take  by mouth., Disp: , Rfl:   •  venlafaxine (EFFEXOR) 150 MG tablet sustained-release 24 hour 24 hr tablet, Take 1 tablet by mouth Daily., Disp: 90 each, Rfl: 1

## 2018-01-29 NOTE — PATIENT INSTRUCTIONS
Ulcerative Colitis, Adult  Ulcerative colitis is long-lasting (chronic) swelling (inflammation) of the large intestine (colon). Sores (ulcers) may also form on the colon.  Ulcerative colitis is closely related to another condition of inflammation of the intestines that is called Crohn disease. Together, they are frequently referred to as inflammatory bowel disease (IBD).  What are the causes?  Ulcerative colitis is caused by increased activity of the immune system in the intestines. The immune system is the system that protects the body against harmful bacteria, viruses, fungi, and other things that can make you sick. When the immune system overacts, it causes inflammation. The cause of the increased immune system activity is not known.  What increases the risk?  Risk factors of ulcerative colitis include:  · Age. This includes:  ¨ Being 15-30 years old.  ¨ Being older than 60 years old.  · Having a family history of ulcerative colitis.  · Being of Buddhist descent.  What are the signs or symptoms?  Common symptoms of ulcerative colitis include rectal bleeding and diarrhea. There is a wide range of symptoms, and a person's symptoms depend on how severe the condition is. Additional symptoms may include:  · Pain or cramping in the belly (abdomen).  · Fever.  · Fatigue.  · Weight loss.  · Night sweats.  · Rectal pain.  · Feeling the immediate need to have a bowel movement.  · Nausea.  · Loss of appetite.  · Anemia.  · Joint pain or soreness.  · Eye irritation.  · Certain skin rashes.  How is this diagnosed?  Ulcerative colitis may be diagnosed by:  · Medical history and physical exam.  · Blood tests and stool tests.  · X-rays.  · CT scans.  · Colonoscopy. For this test, a flexible tube is inserted into your anus and your colon is examined.  · Examination of a tissue sample from your colon (biopsy).  How is this treated?  Treatment for ulcerative colitis may include medicines to:  · Decrease inflammation.  · Control your  immune system.  Surgery may also be necessary.  Follow these instructions at home:  Medicines and vitamins  · Take medicines only as directed by your doctor. Do not take aspirin.  · Ask your doctor if you should take any vitamins or supplements.  Lifestyle  · Exercise regularly.  · Limit alcohol intake to no more than 1 drink per day for nonpregnant women and 2 drinks per day for men. One drink equals 12 ounces of beer, 5 ounces of wine, or 1½ ounces of hard liquor.  Eating and drinking  · Drink enough fluid to keep your urine clear or pale yellow.  · Ask your health care provider about the best diet for you. Follow the diet as directed by your health care provider. This may include:  ¨ Avoiding carbonated drinks.  ¨ Avoiding popcorn, vegetable skins, nuts, and other high-fiber foods when you have symptoms of ulcerative colitis.  ¨ Eating smaller meals more often.  ¨ Keeping a food diary. This may help you to find and avoid any foods that make you feel not well.  · Limit your caffeine intake.  General instructions  · Keep all follow-up appointments as directed by your health care provider. This is important.  Contact a health care provider if:  · Your symptoms do not improve or get worse with treatment.  · You continue to lose weight.  · You have constant cramps or loose bowels.  · You develop a new skin rash, skin sores, or eye problems.  · You have a fever or chills.  Get help right away if:  · You have bloody diarrhea.  · You have severe pain in your abdomen.  · You vomit.  This information is not intended to replace advice given to you by your health care provider. Make sure you discuss any questions you have with your health care provider.  Document Released: 09/27/2006 Document Revised: 08/20/2017 Document Reviewed: 04/11/2016  [employer] Patient Education © 2017 [employer]

## 2018-01-30 LAB
ALBUMIN SERPL-MCNC: 3.6 G/DL (ref 3.5–5.5)
ALBUMIN/GLOB SERPL: 1.3 {RATIO} (ref 1.2–2.2)
ALP SERPL-CCNC: 72 IU/L (ref 39–117)
ALT SERPL-CCNC: 21 IU/L (ref 0–32)
AST SERPL-CCNC: 14 IU/L (ref 0–40)
BILIRUB SERPL-MCNC: <0.2 MG/DL (ref 0–1.2)
BUN SERPL-MCNC: 21 MG/DL (ref 6–24)
BUN/CREAT SERPL: 33 (ref 9–23)
CALCIUM SERPL-MCNC: 8.7 MG/DL (ref 8.7–10.2)
CHLORIDE SERPL-SCNC: 98 MMOL/L (ref 96–106)
CHOLEST SERPL-MCNC: 253 MG/DL (ref 100–199)
CO2 SERPL-SCNC: 26 MMOL/L (ref 18–29)
CREAT SERPL-MCNC: 0.63 MG/DL (ref 0.57–1)
GFR SERPLBLD CREATININE-BSD FMLA CKD-EPI: 114 ML/MIN/1.73
GFR SERPLBLD CREATININE-BSD FMLA CKD-EPI: 99 ML/MIN/1.73
GLOBULIN SER CALC-MCNC: 2.7 G/DL (ref 1.5–4.5)
GLUCOSE SERPL-MCNC: 171 MG/DL (ref 65–99)
HDLC SERPL-MCNC: 41 MG/DL
LDLC SERPL CALC-MCNC: ABNORMAL MG/DL (ref 0–99)
POTASSIUM SERPL-SCNC: 4 MMOL/L (ref 3.5–5.2)
PROT SERPL-MCNC: 6.3 G/DL (ref 6–8.5)
SODIUM SERPL-SCNC: 141 MMOL/L (ref 134–144)
TRIGL SERPL-MCNC: 505 MG/DL (ref 0–149)
VLDLC SERPL CALC-MCNC: ABNORMAL MG/DL (ref 5–40)

## 2018-01-31 NOTE — PROGRESS NOTES
Call and mail copy of results to patient.  Blood counts normal  Kidney and liver function normal  Triglycerides are very high, work on diet.

## 2018-02-05 RX ORDER — LISINOPRIL 20 MG/1
20 TABLET ORAL DAILY
COMMUNITY
End: 2018-11-19

## 2018-02-06 ENCOUNTER — ON CAMPUS - OUTPATIENT (OUTPATIENT)
Dept: URBAN - METROPOLITAN AREA HOSPITAL 114 | Facility: HOSPITAL | Age: 58
End: 2018-02-06
Payer: MEDICARE

## 2018-02-06 ENCOUNTER — ANESTHESIA (OUTPATIENT)
Dept: GASTROENTEROLOGY | Facility: HOSPITAL | Age: 58
End: 2018-02-06

## 2018-02-06 ENCOUNTER — HOSPITAL ENCOUNTER (OUTPATIENT)
Facility: HOSPITAL | Age: 58
Setting detail: HOSPITAL OUTPATIENT SURGERY
Discharge: HOME | End: 2018-02-06
Attending: INTERNAL MEDICINE | Admitting: INTERNAL MEDICINE

## 2018-02-06 ENCOUNTER — ANESTHESIA EVENT (OUTPATIENT)
Dept: GASTROENTEROLOGY | Facility: HOSPITAL | Age: 58
End: 2018-02-06

## 2018-02-06 VITALS
HEART RATE: 85 BPM | HEIGHT: 60 IN | TEMPERATURE: 98.9 F | DIASTOLIC BLOOD PRESSURE: 43 MMHG | SYSTOLIC BLOOD PRESSURE: 108 MMHG | OXYGEN SATURATION: 98 % | RESPIRATION RATE: 18 BRPM | BODY MASS INDEX: 26.9 KG/M2 | WEIGHT: 137 LBS

## 2018-02-06 DIAGNOSIS — K51.50 LEFT SIDED COLITIS WITHOUT COMPLICATIONS: ICD-10-CM

## 2018-02-06 DIAGNOSIS — K62.5 RECTAL BLEEDING: ICD-10-CM

## 2018-02-06 DIAGNOSIS — K92.1 MELENA: ICD-10-CM

## 2018-02-06 DIAGNOSIS — K51.90 ULCERATIVE COLITIS (HCC): ICD-10-CM

## 2018-02-06 LAB — C DIFF TOX GENS STL QL NAA+PROBE: NEGATIVE

## 2018-02-06 PROCEDURE — 88305 TISSUE EXAM BY PATHOLOGIST: CPT | Performed by: INTERNAL MEDICINE

## 2018-02-06 PROCEDURE — 87493 C DIFF AMPLIFIED PROBE: CPT | Performed by: INTERNAL MEDICINE

## 2018-02-06 PROCEDURE — 45331 SIGMOIDOSCOPY AND BIOPSY: CPT | Performed by: INTERNAL MEDICINE

## 2018-02-06 PROCEDURE — 25010000002 PROPOFOL 10 MG/ML EMULSION: Performed by: ANESTHESIOLOGY

## 2018-02-06 RX ORDER — SODIUM CHLORIDE, SODIUM LACTATE, POTASSIUM CHLORIDE, CALCIUM CHLORIDE 600; 310; 30; 20 MG/100ML; MG/100ML; MG/100ML; MG/100ML
30 INJECTION, SOLUTION INTRAVENOUS CONTINUOUS PRN
Status: DISCONTINUED | OUTPATIENT
Start: 2018-02-06 | End: 2018-02-06 | Stop reason: HOSPADM

## 2018-02-06 RX ORDER — PROPOFOL 10 MG/ML
VIAL (ML) INTRAVENOUS CONTINUOUS PRN
Status: DISCONTINUED | OUTPATIENT
Start: 2018-02-06 | End: 2018-02-06 | Stop reason: SURG

## 2018-02-06 RX ORDER — PROPOFOL 10 MG/ML
VIAL (ML) INTRAVENOUS AS NEEDED
Status: DISCONTINUED | OUTPATIENT
Start: 2018-02-06 | End: 2018-02-06 | Stop reason: SURG

## 2018-02-06 RX ORDER — LIDOCAINE HYDROCHLORIDE 20 MG/ML
INJECTION, SOLUTION INFILTRATION; PERINEURAL AS NEEDED
Status: DISCONTINUED | OUTPATIENT
Start: 2018-02-06 | End: 2018-02-06 | Stop reason: SURG

## 2018-02-06 RX ORDER — GLYCOPYRROLATE 0.2 MG/ML
INJECTION INTRAMUSCULAR; INTRAVENOUS AS NEEDED
Status: DISCONTINUED | OUTPATIENT
Start: 2018-02-06 | End: 2018-02-06 | Stop reason: SURG

## 2018-02-06 RX ADMIN — SODIUM CHLORIDE, POTASSIUM CHLORIDE, SODIUM LACTATE AND CALCIUM CHLORIDE 30 ML/HR: 600; 310; 30; 20 INJECTION, SOLUTION INTRAVENOUS at 08:50

## 2018-02-06 RX ADMIN — PROPOFOL 100 MG: 10 INJECTION, EMULSION INTRAVENOUS at 09:16

## 2018-02-06 RX ADMIN — GLYCOPYRROLATE 0.2 MG: 0.2 INJECTION INTRAMUSCULAR; INTRAVENOUS at 09:16

## 2018-02-06 RX ADMIN — PROPOFOL 160 MCG/KG/MIN: 10 INJECTION, EMULSION INTRAVENOUS at 09:16

## 2018-02-06 RX ADMIN — LIDOCAINE HYDROCHLORIDE 60 MG: 20 INJECTION, SOLUTION INFILTRATION; PERINEURAL at 09:16

## 2018-02-06 NOTE — H&P
Patient Care Team:  ELISABETH Garza as PCP - General (Family Medicine)  ELISABETH Garza as PCP - Claims Attributed    Chief complaint bleeding, colitis    Subjective     History of Present Illness  Bleeding, known colitis  Not tolerated  Enemas , steroids have not held her symptoms   Review of Systems   All other systems reviewed and are negative.       Past Medical History:   Diagnosis Date   • Acid reflux    • Anemia associated with acute blood loss    • Anxiety    • Cancer     SKIN   • High cholesterol    • High triglycerides    • History of transfusion    • Hypertension    • Rectal bleeding    • Ulcerative colitis      Past Surgical History:   Procedure Laterality Date   • ENDOSCOPY N/A 12/12/2017    Procedure: ESOPHAGOGASTRODUODENOSCOPY WITH BX AND PALACIO DILATION [address];  Surgeon: Randal Demarco MD;  Location: [facility] ENDOSCOPY;  Service:    • EYE SURGERY       Family History   Problem Relation Age of Onset   • COPD Mother    • Other Father      brain tumor   • Cancer Other      malignant neoplasm   • Heart attack Other    • Malig Hyperthermia Neg Hx      Social History   Substance Use Topics   • Smoking status: Never Smoker   • Smokeless tobacco: Never Used   • Alcohol use No     Prescriptions Prior to Admission   Medication Sig Dispense Refill Last Dose   • busPIRone (BUSPAR) 7.5 MG tablet Take 1 tablet by mouth 2 (Two) Times a Day. 60 tablet 6 2/5/2018 at Unknown time   • lisinopril (PRINIVIL,ZESTRIL) 20 MG tablet Take 20 mg by mouth Daily.   2/6/2018 at Unknown time   • Loratadine 10 MG capsule Take 10 mg by mouth Daily.   2/5/2018 at Unknown time   • mesalamine (LIALDA) 1.2 G EC tablet Take 4 tablets by mouth daily with dinner.   2/5/2018 at Unknown time   • omeprazole (priLOSEC) 40 MG capsule Take 1 capsule by mouth 2 (Two) Times a Day. 180 capsule 1 2/6/2018 at Unknown time   • Pediatric Multivitamins-Iron (FLINTSTONES PLUS IRON PO) Take 2 tablets by mouth Daily.   2/5/2018 at  Unknown time   • predniSONE (DELTASONE) 10 MG tablet 40mg po daily x 7 days, then  30mg po daily x 7 days, then  20mg po daily x 7 days, then  10mg po daily x 7 days, then   STOP   2/5/2018 at Unknown time   • Probiotic Product (PROBIOTIC ADVANCED PO) Take 1 tablet by mouth Daily.   2/5/2018 at Unknown time   • venlafaxine (EFFEXOR) 150 MG tablet sustained-release 24 hour 24 hr tablet Take 1 tablet by mouth Daily. 90 each 1 2/5/2018 at Unknown time     Allergies:  Review of patient's allergies indicates no known allergies.    Objective      Vital Signs  Temp:  [97.4 °F (36.3 °C)] 97.4 °F (36.3 °C)  Heart Rate:  [87] 87  Resp:  [20] 20  BP: (121)/(65) 121/65    Physical Exam   Constitutional: She is oriented to person, place, and time. She appears well-nourished.   HENT:   Mouth/Throat: Oropharynx is clear and moist.   Eyes: Conjunctivae are normal.   Cardiovascular: Normal rate.    Pulmonary/Chest: Effort normal and breath sounds normal.   Abdominal: Soft.   Neurological: She is alert and oriented to person, place, and time.   Skin: Skin is warm and dry.       Results Review:   I reviewed the patient's new clinical results.      Assessment/Plan     Active Problems:    * No active hospital problems. *      Assessment:  (Ulcerative colitis  bleeding).     Plan:   (Fiberoptic sigmoidoscopy  Risks, alternatives and benefits discussed and patient is agreeable to proceed. ).       I discussed the patients findings and my recommendations with patient and nursing staff    Randal Demarco MD  02/06/18  9:16 AM  \

## 2018-02-06 NOTE — PLAN OF CARE
Problem: Patient Care Overview (Adult)  Goal: Plan of Care Review  Outcome: Ongoing (interventions implemented as appropriate)   02/06/18 0821   Coping/Psychosocial Response Interventions   Plan Of Care Reviewed With patient   Patient Care Overview   Progress progress toward functional goals as expected     Goal: Adult Individualization and Mutuality  Outcome: Ongoing (interventions implemented as appropriate)   02/06/18 0821   Individualization   Patient Specific Preferences none     Goal: Discharge Needs Assessment  Outcome: Ongoing (interventions implemented as appropriate)   02/06/18 0821   Discharge Needs Assessment   Concerns To Be Addressed no discharge needs identified   Discharge Disposition home or self-care   Living Environment   Transportation Available car;family or friend will provide       Problem: GI Endoscopy (Adult)  Goal: Signs and Symptoms of Listed Potential Problems Will be Absent or Manageable (GI Endoscopy)  Outcome: Ongoing (interventions implemented as appropriate)   02/06/18 0821   GI Endoscopy   Problems Assessed (GI Endoscopy) all   Problems Present (GI Endoscopy) bleeding

## 2018-02-06 NOTE — ANESTHESIA POSTPROCEDURE EVALUATION
Patient: Marizol Park    Procedure Summary     Date Anesthesia Start Anesthesia Stop Room / Location    02/06/18 0909 0935  [facility] /  [facility]       Procedure Diagnosis Surgeon Provider    FLEX SIGMOIDOSCOPY TO 50CM WITH COLD BIOPSIES (N/A ) No diagnosis on file. MD Jovani Fontanez MD          Anesthesia Type: MAC  Last vitals  BP   107/57 (02/06/18 0935)   Temp   37.2 °C (98.9 °F) (02/06/18 0935)   Pulse   93 (02/06/18 0935)   Resp   18 (02/06/18 0935)     SpO2   100 % (02/06/18 0935)     Post Anesthesia Care and Evaluation    Patient participation: complete - patient participated  Level of consciousness: awake  Pain management: adequate  Airway patency: patent  Anesthetic complications: No anesthetic complications    Cardiovascular status: acceptable  Respiratory status: acceptable  Hydration status: acceptable

## 2018-02-06 NOTE — ANESTHESIA PREPROCEDURE EVALUATION
Anesthesia Evaluation                  Airway   Mallampati: II  TM distance: >3 FB  Neck ROM: full  Dental - normal exam     Pulmonary - normal exam    breath sounds clear to auscultation  Cardiovascular - normal exam    Rhythm: regular  Rate: normal    (+) hypertension, hyperlipidemia      Neuro/Psych  (+) psychiatric history Anxiety,     GI/Hepatic/Renal/Endo    (+)  GERD,     Musculoskeletal     (+) back pain, chronic pain,   Abdominal    Substance History      OB/GYN          Other      history of cancer                    Anesthesia Plan    ASA 3     MAC     Anesthetic plan and risks discussed with patient.

## 2018-02-06 NOTE — DISCHARGE INSTRUCTIONS
For the next 24 hours patient needs to be with a responsible adult.    For 24 hours DO NOT drive, operate machinery, appliances, drink alcohol, make important decisions or sign legal documents.    Start with a light or bland diet and advance to regular diet as tolerated.    Follow recommendations on procedure report provided by your doctor.    Call Dr ANN  for problems [phone]    Problems may include but not limited to: large amounts of bleeding, trouble breathing, repeated vomiting, severe unrelieved pain, fever or chills.

## 2018-02-07 LAB
CYTO UR: NORMAL
LAB AP CASE REPORT: NORMAL
Lab: NORMAL
PATH REPORT.FINAL DX SPEC: NORMAL
PATH REPORT.GROSS SPEC: NORMAL

## 2018-02-12 ENCOUNTER — OFFICE (OUTPATIENT)
Dept: URBAN - METROPOLITAN AREA OTHER 6 | Facility: OTHER | Age: 58
End: 2018-02-12

## 2018-02-12 VITALS
DIASTOLIC BLOOD PRESSURE: 82 MMHG | WEIGHT: 134 LBS | HEART RATE: 102 BPM | SYSTOLIC BLOOD PRESSURE: 130 MMHG | HEIGHT: 60 IN

## 2018-02-12 DIAGNOSIS — K62.5 HEMORRHAGE OF ANUS AND RECTUM: ICD-10-CM

## 2018-02-12 DIAGNOSIS — K51.50 LEFT SIDED COLITIS WITHOUT COMPLICATIONS: ICD-10-CM

## 2018-02-12 PROCEDURE — 99212 OFFICE O/P EST SF 10 MIN: CPT

## 2018-02-12 RX ORDER — MESALAMINE 4 G/60ML
ENEMA RECTAL
Qty: 10 | Refills: 6 | Status: COMPLETED
Start: 2016-12-06 | End: 2018-04-09

## 2018-03-15 ENCOUNTER — TELEPHONE (OUTPATIENT)
Dept: FAMILY MEDICINE CLINIC | Facility: CLINIC | Age: 58
End: 2018-03-15

## 2018-03-16 ENCOUNTER — OFFICE VISIT (OUTPATIENT)
Dept: FAMILY MEDICINE CLINIC | Facility: CLINIC | Age: 58
End: 2018-03-16

## 2018-03-16 VITALS
DIASTOLIC BLOOD PRESSURE: 80 MMHG | HEIGHT: 60 IN | SYSTOLIC BLOOD PRESSURE: 128 MMHG | HEART RATE: 106 BPM | OXYGEN SATURATION: 97 % | BODY MASS INDEX: 28.66 KG/M2 | WEIGHT: 146 LBS

## 2018-03-16 DIAGNOSIS — T78.40XA ALLERGIC REACTION, INITIAL ENCOUNTER: Primary | ICD-10-CM

## 2018-03-16 DIAGNOSIS — L50.9 HIVES: ICD-10-CM

## 2018-03-16 PROCEDURE — 96372 THER/PROPH/DIAG INJ SC/IM: CPT | Performed by: FAMILY MEDICINE

## 2018-03-16 PROCEDURE — 99214 OFFICE O/P EST MOD 30 MIN: CPT | Performed by: FAMILY MEDICINE

## 2018-03-16 RX ORDER — HYDROXYZINE HYDROCHLORIDE 25 MG/1
TABLET, FILM COATED ORAL
Qty: 40 TABLET | Refills: 0 | Status: SHIPPED | OUTPATIENT
Start: 2018-03-16 | End: 2018-04-04

## 2018-03-16 RX ORDER — METHYLPREDNISOLONE SODIUM SUCCINATE 125 MG/2ML
125 INJECTION, POWDER, LYOPHILIZED, FOR SOLUTION INTRAMUSCULAR; INTRAVENOUS ONCE
Status: COMPLETED | OUTPATIENT
Start: 2018-03-16 | End: 2018-03-16

## 2018-03-16 RX ORDER — RANITIDINE 150 MG/1
150 TABLET ORAL 2 TIMES DAILY
Qty: 20 TABLET | Refills: 0 | Status: SHIPPED | OUTPATIENT
Start: 2018-03-16 | End: 2018-03-23 | Stop reason: SDUPTHER

## 2018-03-16 RX ADMIN — METHYLPREDNISOLONE SODIUM SUCCINATE 125 MG: 125 INJECTION, POWDER, LYOPHILIZED, FOR SOLUTION INTRAMUSCULAR; INTRAVENOUS at 09:31

## 2018-03-16 NOTE — PATIENT INSTRUCTIONS
"Increase prednisone to 40mg daily x 5 days, then back to 30mg po daily.      Anaphylactic Reaction  An anaphylactic reaction (anaphylaxis) is a sudden allergic reaction that is very bad (severe). It also affects more than one part of the body. This condition can be life-threatening. If you have an anaphylactic reaction, you need to get medical help right away. You may need to stay in the hospital.  Your doctor may teach you how to use an allergy kit (anaphylaxis kit) and how to give yourself an allergy shot (epinephrine injection). You can give yourself an allergy shot with what is commonly called an auto-injector \"pen.\"  Symptoms of an anaphylactic reaction may include:  · A stuffy nose (nasal congestion).  · Headache.  · Tingling in your mouth.  · A flushed face.  · An itchy, red rash.  · Swelling of your eyes, lips, face, or tongue.  · Swelling of the back of your mouth and your throat.  · Breathing loudly (wheezing).  · A hoarse voice.  · Itchy, red, swollen areas of skin (hives).  · Dizziness or light-headedness.  · Passing out (fainting).  · Feeling worried or nervous (anxiety).  · Feeling confused.  · Pain in your belly (abdomen) or chest.  · Trouble with breathing, talking, or swallowing.  · A tight feeling in your chest or throat.  · Fast or uneven heartbeats (palpitations).  · Throwing up (vomiting).  · Watery poop (diarrhea).  Follow these instructions at home:  Safety   · Always keep an auto-injector pen or your allergy kit with you. These could save your life. Use them as told by your doctor.  · Do not drive until your doctor says that it is safe.  · Make sure that you, the people who live with you, and your employer know:  ¨ How to use your allergy kit.  ¨ How to use an auto-injector pen to give you an allergy shot.  · If you used your auto-injector pen:  ¨ Get more medicine for it right away. This is important in case you have another reaction.  ¨ Get help right away.  · Wear a bracelet or necklace " that says you have an allergy, if your doctor tells you to do this.  · Learn the signs of a very bad allergic reaction.  · Work with your doctors to make a plan for what to do if you have a very bad allergic reaction. Being prepared is important.  General instructions   · Take over-the-counter and prescription medicines only as told by your doctor.  · If you have itchy, red, swollen areas of skin or a rash:  ¨ Use over-the-counter medicine (antihistamine) as told by your doctor.  ¨ Put cold, wet cloths (cold compresses) on your skin.  ¨ Take baths or showers in cool water. Avoid hot water.  · If you had tests done, it is up to you to get your test results. Ask your doctor when your results will be ready.  · Tell any doctors who care for you that you have an allergy.  · Keep all follow-up visits as told by your doctor. This is important.  How is this prevented?  · Avoid things (allergens) that gave you a very bad allergic reaction before.  · If you have a food allergy and you go to a restaurant, tell your  about your allergy. If you are not sure if your meal was made with food that you are allergic to, ask your  before you eat it.  Contact a doctor if:  · You have symptoms of an allergic reaction. You may notice them soon after being around whatever it is that you are allergic to. Symptoms may include:  ¨ A rash.  ¨ A headache.  ¨ Sneezing or a runny nose.  ¨ Swelling.  ¨ Feeling sick to your stomach.  ¨ Watery poop.  Get help right away if:  · You had to use your auto-injector pen. You must go to the emergency room even if the medicine seems to be working.  · You have any of these:  ¨ A tight feeling in your chest or your throat.  ¨ Loud breathing.  ¨ Trouble with breathing.  ¨ Itchy, red, swollen areas of skin.  ¨ Red skin or itching all over your body.  ¨ Swelling in your lips, tongue, or the back of your throat.  · You have throwing up that gets very bad.  · You have watery poop that gets very  "bad.  · You pass out or feel like you might pass out.  These symptoms may be an emergency. Do not wait to see if the symptoms will go away. Use your auto-injector pen or allergy kit as you have been told. Get medical help right away. Call your local emergency services (911 in the [place] Do not drive yourself to the hospital.  Summary  · An anaphylactic reaction (anaphylaxis) is a sudden allergic reaction that is very bad (severe).  · This condition can be life-threatening. If you have an anaphylactic reaction, you need to get medical help right away.  · Your doctor may teach you how to use an allergy kit (anaphylaxis kit) and how to give yourself an allergy shot (epinephrine injection) with an auto-injector \"pen.\"  · Always keep an auto-injector pen or your allergy kit with you. These could save your life. Use them as told by your doctor.  · If you had to use your auto-injector pen, you must go to the emergency room even if the medicine seems to be working.  This information is not intended to replace advice given to you by your health care provider. Make sure you discuss any questions you have with your health care provider.  Document Released: 06/05/2009 Document Revised: 08/11/2017 Document Reviewed: 08/11/2017  Elsevier Interactive Patient Education © 2017 [employer]    "

## 2018-03-23 DIAGNOSIS — T78.40XA ALLERGIC REACTION, INITIAL ENCOUNTER: ICD-10-CM

## 2018-03-23 DIAGNOSIS — L50.9 HIVES: ICD-10-CM

## 2018-03-23 RX ORDER — RANITIDINE 150 MG/1
TABLET ORAL
Qty: 20 TABLET | Refills: 0 | Status: SHIPPED | OUTPATIENT
Start: 2018-03-23 | End: 2018-04-04

## 2018-03-26 ENCOUNTER — OFFICE VISIT (OUTPATIENT)
Dept: FAMILY MEDICINE CLINIC | Facility: CLINIC | Age: 58
End: 2018-03-26

## 2018-03-26 VITALS
HEIGHT: 60 IN | BODY MASS INDEX: 29.25 KG/M2 | OXYGEN SATURATION: 98 % | DIASTOLIC BLOOD PRESSURE: 74 MMHG | WEIGHT: 149 LBS | SYSTOLIC BLOOD PRESSURE: 136 MMHG | TEMPERATURE: 97.8 F | HEART RATE: 111 BPM

## 2018-03-26 DIAGNOSIS — G43.909 MIGRAINE WITHOUT STATUS MIGRAINOSUS, NOT INTRACTABLE, UNSPECIFIED MIGRAINE TYPE: Primary | ICD-10-CM

## 2018-03-26 PROCEDURE — 99213 OFFICE O/P EST LOW 20 MIN: CPT | Performed by: NURSE PRACTITIONER

## 2018-03-26 RX ORDER — MESALAMINE 1000 MG/1
1000 SUPPOSITORY RECTAL NIGHTLY
Qty: 30 SUPPOSITORY | Refills: 0 | Status: SHIPPED | OUTPATIENT
Start: 2018-03-26 | End: 2018-07-26

## 2018-03-26 NOTE — PROGRESS NOTES
Marek Park is a 57 y.o. female. She went to ED yesterday for a migraine. She had a migraine about one year ago. This one was different from that one. She started with the pain on Fri. She continued to have pain on Sun. She is concerned about the migraine lasting so long. She was told to follow up in the office today.     Headache    This is a new problem. The current episode started in the past 7 days. The problem occurs constantly. The problem has been gradually improving. The pain is located in the frontal region. The pain does not radiate. The pain quality is not similar to prior headaches. The quality of the pain is described as throbbing and aching. Pertinent negatives include no blurred vision or fever. Treatments tried: ED- Rec'd toradol, benadryl, Reglan, The treatment provided moderate relief. Her past medical history is significant for migraine headaches.        The following portions of the patient's history were reviewed and updated as appropriate: allergies, current medications, past family history, past medical history, past social history, past surgical history and problem list.    Review of Systems   Constitutional: Positive for diaphoresis (on prednisone). Negative for activity change and fever.   Eyes: Negative for blurred vision.   Respiratory: Negative.    Cardiovascular: Negative.    Neurological: Positive for headaches.       Objective   Physical Exam   Constitutional: She is oriented to person, place, and time. Vital signs are normal. She appears well-developed and well-nourished. She is cooperative.   Cardiovascular: Normal rate and regular rhythm.    Pulmonary/Chest: Effort normal and breath sounds normal.   Neurological: She is alert and oriented to person, place, and time. She has normal strength. No cranial nerve deficit or sensory deficit.   Nursing note and vitals reviewed.    Vitals:    03/26/18 1355   BP: 136/74   Pulse: 111   Temp: 97.8 °F (36.6 °C)   TempSrc: Oral  "  SpO2: 98%   Weight: 67.6 kg (149 lb)   Height: 152.4 cm (60\")       Assessment/Plan   Diagnoses and all orders for this visit:    Migraine without status migrainosus, not intractable, unspecified migraine type    Other orders  -     mesalamine (CANASA) 1000 MG suppository; Insert 1 suppository into the rectum Every Night.    Continue current medication.   RTC prn               "

## 2018-04-02 RX ORDER — OMEPRAZOLE 40 MG/1
CAPSULE, DELAYED RELEASE ORAL
Qty: 180 CAPSULE | Refills: 1 | Status: SHIPPED | OUTPATIENT
Start: 2018-04-02 | End: 2018-10-03 | Stop reason: SDUPTHER

## 2018-04-04 ENCOUNTER — OFFICE VISIT (OUTPATIENT)
Dept: FAMILY MEDICINE CLINIC | Facility: CLINIC | Age: 58
End: 2018-04-04

## 2018-04-04 VITALS
WEIGHT: 149 LBS | SYSTOLIC BLOOD PRESSURE: 128 MMHG | TEMPERATURE: 97.8 F | HEART RATE: 107 BPM | BODY MASS INDEX: 29.25 KG/M2 | OXYGEN SATURATION: 98 % | DIASTOLIC BLOOD PRESSURE: 78 MMHG | HEIGHT: 60 IN

## 2018-04-04 DIAGNOSIS — H93.8X3 EAR FULLNESS, BILATERAL: Primary | ICD-10-CM

## 2018-04-04 PROCEDURE — 99212 OFFICE O/P EST SF 10 MIN: CPT | Performed by: NURSE PRACTITIONER

## 2018-04-04 NOTE — PROGRESS NOTES
"Marek Park is a 57 y.o. female who presents c/o both ears feeling clogged.   History of Present Illness   Ears are clogged not painful, recent reaction to remicaide affected whole body rash, especially into ears. Settled with calamine lotion, after 2 ER visits. Ear feels like sticking together and clogged.   The following portions of the patient's history were reviewed and updated as appropriate: allergies, current medications, past family history, past medical history, past social history, past surgical history and problem list.    Review of Systems   Constitutional: Positive for fever.   HENT: Negative for ear pain.    Respiratory: Negative.    Cardiovascular: Negative.    Gastrointestinal: Positive for abdominal distention. Negative for abdominal pain.   Skin: Positive for rash.   Neurological: Positive for headaches (migraine).   Hematological: Negative.    Psychiatric/Behavioral: Negative.      /78   Pulse 107   Temp 97.8 °F (36.6 °C) (Oral)   Ht 152.4 cm (60\")   Wt 67.6 kg (149 lb)   SpO2 98%   BMI 29.10 kg/m²     Objective   Physical Exam   Constitutional: She appears well-developed and well-nourished.   HENT:   Head: Normocephalic and atraumatic.   Right Ear: Tympanic membrane normal.   Left Ear: Tympanic membrane normal.   Nose: Nose normal.   Mouth/Throat: Uvula is midline and oropharynx is clear and moist.   Skin:   Faint red macular rash widespread to abdomen.    Nursing note and vitals reviewed.    Assessment/Plan   Problems Addressed this Visit     None      Visit Diagnoses     Ear fullness, bilateral    -  Primary        No cerumen impaction, no further treatment needed, rash should continue to settle. FU prn         "

## 2018-04-09 ENCOUNTER — OFFICE (OUTPATIENT)
Dept: URBAN - METROPOLITAN AREA OTHER 6 | Facility: OTHER | Age: 58
End: 2018-04-09

## 2018-04-09 VITALS
HEIGHT: 60 IN | SYSTOLIC BLOOD PRESSURE: 138 MMHG | WEIGHT: 149 LBS | DIASTOLIC BLOOD PRESSURE: 82 MMHG | HEART RATE: 106 BPM

## 2018-04-09 DIAGNOSIS — K51.50 LEFT SIDED COLITIS WITHOUT COMPLICATIONS: ICD-10-CM

## 2018-04-09 DIAGNOSIS — K62.5 HEMORRHAGE OF ANUS AND RECTUM: ICD-10-CM

## 2018-04-09 DIAGNOSIS — R19.7 DIARRHEA, UNSPECIFIED: ICD-10-CM

## 2018-04-09 PROCEDURE — 99212 OFFICE O/P EST SF 10 MIN: CPT

## 2018-05-03 ENCOUNTER — CLINICAL SUPPORT (OUTPATIENT)
Dept: FAMILY MEDICINE CLINIC | Facility: CLINIC | Age: 58
End: 2018-05-03

## 2018-05-03 DIAGNOSIS — Z23 IMMUNIZATION DUE: Primary | ICD-10-CM

## 2018-05-03 PROCEDURE — 90632 HEPA VACCINE ADULT IM: CPT | Performed by: FAMILY MEDICINE

## 2018-05-03 PROCEDURE — 90471 IMMUNIZATION ADMIN: CPT | Performed by: FAMILY MEDICINE

## 2018-05-21 ENCOUNTER — OFFICE VISIT (OUTPATIENT)
Dept: FAMILY MEDICINE CLINIC | Facility: CLINIC | Age: 58
End: 2018-05-21

## 2018-05-21 VITALS
SYSTOLIC BLOOD PRESSURE: 124 MMHG | HEART RATE: 93 BPM | BODY MASS INDEX: 28.9 KG/M2 | DIASTOLIC BLOOD PRESSURE: 70 MMHG | OXYGEN SATURATION: 96 % | WEIGHT: 148 LBS | TEMPERATURE: 97.6 F

## 2018-05-21 DIAGNOSIS — Z79.899 DRUG THERAPY: ICD-10-CM

## 2018-05-21 DIAGNOSIS — F41.9 ANXIETY: Primary | ICD-10-CM

## 2018-05-21 DIAGNOSIS — R61 NIGHT SWEATS: ICD-10-CM

## 2018-05-21 DIAGNOSIS — I10 BENIGN ESSENTIAL HYPERTENSION: ICD-10-CM

## 2018-05-21 DIAGNOSIS — K51.20 ULCERATIVE PROCTITIS WITHOUT COMPLICATION (HCC): ICD-10-CM

## 2018-05-21 DIAGNOSIS — E78.5 DYSLIPIDEMIA: ICD-10-CM

## 2018-05-21 LAB
HCT VFR BLDA CALC: 43.1 %
HGB BLDA-MCNC: 13.6 G/DL
MCH, POC: 27.4
MCHC, POC: 31.6
MCV, POC: 86.6
PLATELET # BLD AUTO: 342 10*3/MM3
PMV BLD: 9.3 FL
RBC, POC: 4.98
RDW, POC: 14.3
WBC # BLD: 6.8 10*3/UL

## 2018-05-21 PROCEDURE — 85027 COMPLETE CBC AUTOMATED: CPT | Performed by: FAMILY MEDICINE

## 2018-05-21 PROCEDURE — 99214 OFFICE O/P EST MOD 30 MIN: CPT | Performed by: FAMILY MEDICINE

## 2018-05-21 RX ORDER — BUSPIRONE HYDROCHLORIDE 10 MG/1
10 TABLET ORAL 2 TIMES DAILY
Qty: 60 TABLET | Refills: 5 | Status: SHIPPED | OUTPATIENT
Start: 2018-05-21 | End: 2018-07-02 | Stop reason: SDUPTHER

## 2018-05-21 NOTE — PROGRESS NOTES
Subjective   Marizol Park is a 57 y.o. female. Presents today for   Chief Complaint   Patient presents with   • Follow-up     med check on humira now.  Having alot of issues with insomnia and hot flashes       Anxiety   Presents for follow-up visit. Symptoms include excessive worry and nervous/anxious behavior. Patient reports no chest pain, panic or shortness of breath. Symptoms occur rarely. The severity of symptoms is mild. The quality of sleep is poor. Nighttime awakenings: occasional, several.     Compliance with medications is %.   Diarrhea    This is a chronic (Has UC, resolved and no further BRBPR) problem. The current episode started more than 1 year ago. The problem has been unchanged. Treatments tried: started humira, bleeding stopped prior. The treatment provided moderate (Off steroids now) relief. Her past medical history is significant for inflammatory bowel disease.   Hyperlipidemia   This is a chronic problem. The current episode started more than 1 year ago. The problem is uncontrolled. Recent lipid tests were reviewed and are high. She has no history of chronic renal disease. There are no known factors aggravating her hyperlipidemia. Pertinent negatives include no chest pain or shortness of breath. She is currently on no antihyperlipidemic treatment. The current treatment provides no improvement (TGS last check over 500) of lipids. Risk factors for coronary artery disease include dyslipidemia, hypertension and post-menopausal.   Hypertension   This is a chronic problem. The current episode started more than 1 year ago. The problem is unchanged. The problem is controlled. Associated symptoms include anxiety. Pertinent negatives include no chest pain, peripheral edema, PND or shortness of breath. There are no associated agents to hypertension. Risk factors for coronary artery disease include dyslipidemia and post-menopausal state. Past treatments include ACE inhibitors. Current antihypertension  treatment includes ACE inhibitors. The current treatment provides moderate improvement. There are no compliance problems.  There is no history of kidney disease, CAD/MI, CVA or heart failure. There is no history of chronic renal disease.   Menopause  Has had night sweats, hot flashes for past 6 years;  Started venlafaxine, but not helping.      Had HEP A x1, 2nd due Halloween  Review of Systems   Respiratory: Negative for shortness of breath.    Cardiovascular: Negative for chest pain and PND.   Gastrointestinal: Positive for diarrhea.   Psychiatric/Behavioral: The patient is nervous/anxious.        The following portions of the patient's history were reviewed and updated as appropriate: allergies, current medications, past medical history and problem list.    Patient Active Problem List   Diagnosis   • Acute post-traumatic stress disorder   • Anxiety   • Benign essential hypertension   • Excessive cerumen in ear canal   • Chest wall pain   • Cough   • Dyslipidemia   • External hemorrhoids   • Fracture, thoracic vertebra, compression   • Gastroesophageal reflux disease   • Grief   • Hemorrhoids   • Night sweats   • Panic disorder without agoraphobia   • Peripheral neuropathy   • Skin lesion   • Ulcerative colitis, acute, with rectal bleeding   • Ulnar neuropathy   • Acute blood loss anemia   • Immunocompromised state   • Proctocolitis with rectal bleeding       Allergies   Allergen Reactions   • Remicade [Infliximab] Hives and Swelling     Facial swelling, hives wide spread   • Sulfa Antibiotics Hives       Current Outpatient Prescriptions on File Prior to Visit   Medication Sig Dispense Refill   • busPIRone (BUSPAR) 7.5 MG tablet Take 1 tablet by mouth 2 (Two) Times a Day. 60 tablet 6   • lisinopril (PRINIVIL,ZESTRIL) 20 MG tablet Take 20 mg by mouth Daily.     • Loratadine 10 MG capsule Take 10 mg by mouth Daily.     • mesalamine (CANASA) 1000 MG suppository Insert 1 suppository into the rectum Every Night. 30  suppository 0   • mesalamine (LIALDA) 1.2 G EC tablet Take 4 tablets by mouth daily with dinner.     • omeprazole (priLOSEC) 40 MG capsule TAKE 1 CAPSULE TWICE DAILY 180 capsule 1   • Pediatric Multivitamins-Iron (FLINTSTONES PLUS IRON PO) Take 2 tablets by mouth Daily.     • Probiotic Product (PROBIOTIC ADVANCED PO) Take 1 tablet by mouth Daily.     • venlafaxine (EFFEXOR) 150 MG tablet sustained-release 24 hour 24 hr tablet Take 1 tablet by mouth Daily. 90 each 1   • [DISCONTINUED] predniSONE (DELTASONE) 10 MG tablet 40mg po daily x 7 days, then  30mg po daily x 7 days, then  20mg po daily x 7 days, then  10mg po daily x 7 days, then   STOP       No current facility-administered medications on file prior to visit.        Objective   Vitals:    05/21/18 0908   BP: 124/70   Pulse: 93   Temp: 97.6 °F (36.4 °C)   SpO2: 96%   Weight: 67.1 kg (148 lb)       Physical Exam   Constitutional: She appears well-developed and well-nourished.   HENT:   Head: Normocephalic and atraumatic.   Neck: Neck supple. No JVD present. No thyromegaly present.   Cardiovascular: Normal rate, regular rhythm and normal heart sounds.  Exam reveals no gallop and no friction rub.    No murmur heard.  Pulmonary/Chest: Effort normal and breath sounds normal. No respiratory distress. She has no wheezes. She has no rales.   Abdominal: Soft. Bowel sounds are normal. She exhibits no distension. There is no tenderness. There is no rebound and no guarding.   Musculoskeletal: She exhibits no edema.   Neurological: She is alert.   Skin: Skin is warm and dry.   Psychiatric: She has a normal mood and affect. Her behavior is normal.   Nursing note and vitals reviewed.    CBC ordered and reviewed.    Assessment/Plan   Marizol was seen today for follow-up.    Diagnoses and all orders for this visit:    Anxiety  -     busPIRone (BUSPAR) 10 MG tablet; Take 1 tablet by mouth 2 (Two) Times a Day.    Night sweats    Ulcerative proctitis without complication    Drug  therapy    Dyslipidemia  -     Comprehensive Metabolic Panel  -     POC CBC  -     Lipid Panel    Benign essential hypertension  -     Comprehensive Metabolic Panel  -     POC CBC  -     Lipid Panel    -night sweats - continue venlafaxine, consider additional agent;  Will go up on buspar for anxiety a little  -uc - doing much better, toleratring humira  -hypertension - controlled, continue medications  -hld - recheck lipids, off steroids now and tgs last check over 500         -Follow up: 6 months       Current Outpatient Prescriptions:   •  adalimumab (HUMIRA) 40 MG/0.8ML Prefilled Syringe Kit injection, Inject 40 mg under the skin 1 (One) Time. Once every 2 weeks, Disp: , Rfl:   •  busPIRone (BUSPAR) 7.5 MG tablet, Take 1 tablet by mouth 2 (Two) Times a Day., Disp: 60 tablet, Rfl: 6  •  lisinopril (PRINIVIL,ZESTRIL) 20 MG tablet, Take 20 mg by mouth Daily., Disp: , Rfl:   •  Loratadine 10 MG capsule, Take 10 mg by mouth Daily., Disp: , Rfl:   •  mesalamine (CANASA) 1000 MG suppository, Insert 1 suppository into the rectum Every Night., Disp: 30 suppository, Rfl: 0  •  mesalamine (LIALDA) 1.2 G EC tablet, Take 4 tablets by mouth daily with dinner., Disp: , Rfl:   •  omeprazole (priLOSEC) 40 MG capsule, TAKE 1 CAPSULE TWICE DAILY, Disp: 180 capsule, Rfl: 1  •  Pediatric Multivitamins-Iron (FLINTSTONES PLUS IRON PO), Take 2 tablets by mouth Daily., Disp: , Rfl:   •  Probiotic Product (PROBIOTIC ADVANCED PO), Take 1 tablet by mouth Daily., Disp: , Rfl:   •  venlafaxine (EFFEXOR) 150 MG tablet sustained-release 24 hour 24 hr tablet, Take 1 tablet by mouth Daily., Disp: 90 each, Rfl: 1

## 2018-05-22 LAB
ALBUMIN SERPL-MCNC: 4.2 G/DL (ref 3.5–5.2)
ALBUMIN/GLOB SERPL: 1.4 G/DL
ALP SERPL-CCNC: 104 U/L (ref 39–117)
ALT SERPL-CCNC: 54 U/L (ref 1–33)
AST SERPL-CCNC: 47 U/L (ref 1–32)
BILIRUB SERPL-MCNC: 0.3 MG/DL (ref 0.1–1.2)
BUN SERPL-MCNC: 17 MG/DL (ref 6–20)
BUN/CREAT SERPL: 22.4 (ref 7–25)
CALCIUM SERPL-MCNC: 9.6 MG/DL (ref 8.6–10.5)
CHLORIDE SERPL-SCNC: 103 MMOL/L (ref 98–107)
CHOLEST SERPL-MCNC: 360 MG/DL (ref 0–200)
CO2 SERPL-SCNC: 26.9 MMOL/L (ref 22–29)
CREAT SERPL-MCNC: 0.76 MG/DL (ref 0.57–1)
GFR SERPLBLD CREATININE-BSD FMLA CKD-EPI: 78 ML/MIN/1.73
GFR SERPLBLD CREATININE-BSD FMLA CKD-EPI: 95 ML/MIN/1.73
GLOBULIN SER CALC-MCNC: 3.1 GM/DL
GLUCOSE SERPL-MCNC: 98 MG/DL (ref 65–99)
HDLC SERPL-MCNC: 39 MG/DL (ref 40–60)
LDLC SERPL CALC-MCNC: ABNORMAL MG/DL
POTASSIUM SERPL-SCNC: 4.4 MMOL/L (ref 3.5–5.2)
PROT SERPL-MCNC: 7.3 G/DL (ref 6–8.5)
SODIUM SERPL-SCNC: 144 MMOL/L (ref 136–145)
TRIGL SERPL-MCNC: 403 MG/DL (ref 0–150)
VLDLC SERPL CALC-MCNC: ABNORMAL MG/DL

## 2018-05-23 ENCOUNTER — TELEPHONE (OUTPATIENT)
Dept: FAMILY MEDICINE CLINIC | Facility: CLINIC | Age: 58
End: 2018-05-23

## 2018-05-23 RX ORDER — FENOFIBRATE 145 MG/1
145 TABLET, COATED ORAL DAILY
Qty: 30 TABLET | Refills: 3 | Status: SHIPPED | OUTPATIENT
Start: 2018-05-23 | End: 2018-08-01 | Stop reason: SDUPTHER

## 2018-05-23 NOTE — TELEPHONE ENCOUNTER
Pt said since starting the humira her hands and arms are swelling where she can't get her rings on. She doesn't have swelling anywhere else. She said her skin feels very tight like its dried out.  Please advise what to tell pt.

## 2018-05-23 NOTE — PROGRESS NOTES
Call and mail copy of results to patient.  Kidney function normal  Borderline elevated liver enzymes, continue to monitor  Triglycerides are still high, start fenofibrate 145 mg 1 po daily

## 2018-05-24 RX ORDER — POTASSIUM CHLORIDE 600 MG/1
8 TABLET, FILM COATED, EXTENDED RELEASE ORAL DAILY
Qty: 5 TABLET | Refills: 0 | Status: SHIPPED | OUTPATIENT
Start: 2018-05-24 | End: 2018-05-26 | Stop reason: SDUPTHER

## 2018-05-24 RX ORDER — FUROSEMIDE 20 MG/1
20 TABLET ORAL DAILY
Qty: 5 TABLET | Refills: 0 | Status: SHIPPED | OUTPATIENT
Start: 2018-05-24 | End: 2018-05-26 | Stop reason: SDUPTHER

## 2018-05-29 RX ORDER — POTASSIUM CHLORIDE 600 MG/1
TABLET, FILM COATED, EXTENDED RELEASE ORAL
Qty: 5 TABLET | Refills: 0 | Status: SHIPPED | OUTPATIENT
Start: 2018-05-29 | End: 2018-07-26

## 2018-05-29 RX ORDER — POTASSIUM CHLORIDE 600 MG/1
TABLET, FILM COATED, EXTENDED RELEASE ORAL
Qty: 5 TABLET | Refills: 0 | OUTPATIENT
Start: 2018-05-29

## 2018-05-29 RX ORDER — FUROSEMIDE 20 MG/1
20 TABLET ORAL DAILY
Qty: 5 TABLET | Refills: 0 | Status: SHIPPED | OUTPATIENT
Start: 2018-05-29 | End: 2018-06-03

## 2018-05-29 RX ORDER — FUROSEMIDE 20 MG/1
20 TABLET ORAL DAILY
Qty: 5 TABLET | Refills: 0 | OUTPATIENT
Start: 2018-05-29 | End: 2018-06-03

## 2018-06-14 ENCOUNTER — TELEPHONE (OUTPATIENT)
Dept: FAMILY MEDICINE CLINIC | Facility: CLINIC | Age: 58
End: 2018-06-14

## 2018-06-14 NOTE — TELEPHONE ENCOUNTER
Take 1 7.5mg buspar 3 times daily instead of 2 times daily and will be close to similar.  Refill 10mg po bid when out.    RRJ

## 2018-07-02 DIAGNOSIS — F41.9 ANXIETY: ICD-10-CM

## 2018-07-02 RX ORDER — BUSPIRONE HYDROCHLORIDE 10 MG/1
10 TABLET ORAL 2 TIMES DAILY
Qty: 180 TABLET | Refills: 1 | Status: SHIPPED | OUTPATIENT
Start: 2018-07-02 | End: 2018-07-26

## 2018-07-13 ENCOUNTER — OFFICE (OUTPATIENT)
Dept: URBAN - METROPOLITAN AREA CLINIC 65 | Facility: CLINIC | Age: 58
End: 2018-07-13

## 2018-07-13 VITALS
SYSTOLIC BLOOD PRESSURE: 120 MMHG | DIASTOLIC BLOOD PRESSURE: 76 MMHG | HEIGHT: 60 IN | HEART RATE: 96 BPM | WEIGHT: 148 LBS

## 2018-07-13 DIAGNOSIS — Z80.0 FAMILY HISTORY OF MALIGNANT NEOPLASM OF DIGESTIVE ORGANS: ICD-10-CM

## 2018-07-13 DIAGNOSIS — K57.30 DIVERTICULOSIS OF LARGE INTESTINE WITHOUT PERFORATION OR ABS: ICD-10-CM

## 2018-07-13 DIAGNOSIS — K51.50 LEFT SIDED COLITIS WITHOUT COMPLICATIONS: ICD-10-CM

## 2018-07-13 PROCEDURE — 99212 OFFICE O/P EST SF 10 MIN: CPT | Performed by: INTERNAL MEDICINE

## 2018-07-26 ENCOUNTER — OFFICE VISIT (OUTPATIENT)
Dept: FAMILY MEDICINE CLINIC | Facility: CLINIC | Age: 58
End: 2018-07-26

## 2018-07-26 VITALS
TEMPERATURE: 97.9 F | HEART RATE: 99 BPM | OXYGEN SATURATION: 97 % | HEIGHT: 60 IN | BODY MASS INDEX: 29.06 KG/M2 | WEIGHT: 148 LBS | DIASTOLIC BLOOD PRESSURE: 70 MMHG | SYSTOLIC BLOOD PRESSURE: 122 MMHG

## 2018-07-26 DIAGNOSIS — J06.9 ACUTE URI: Primary | ICD-10-CM

## 2018-07-26 PROCEDURE — 99213 OFFICE O/P EST LOW 20 MIN: CPT | Performed by: NURSE PRACTITIONER

## 2018-07-26 RX ORDER — ADALIMUMAB 40MG/0.8ML
KIT SUBCUTANEOUS
COMMUNITY
Start: 2018-07-25 | End: 2018-07-26 | Stop reason: SDUPTHER

## 2018-07-26 RX ORDER — BUSPIRONE HYDROCHLORIDE 7.5 MG/1
TABLET ORAL
COMMUNITY
Start: 2018-06-07 | End: 2018-11-19

## 2018-07-26 NOTE — PROGRESS NOTES
"Marek Park is a 57 y.o. female who presents c/o headache, congestion x 1 day.  with recent pneumonia.     History of Present Illness   Worried about pneumonia, as daughter  of pneumonia. Doesn't want to take any chances as on humira. Symptoms include low back pain, hoarseness, and headaches severe frontal. Aborted with tylenol.   The following portions of the patient's history were reviewed and updated as appropriate: allergies, current medications, past family history, past medical history, past social history, past surgical history and problem list.    Review of Systems   Constitutional: Negative for chills and fever.   HENT: Positive for congestion, sinus pressure and voice change. Negative for ear pain, postnasal drip, rhinorrhea and sore throat.    Respiratory: Positive for cough (yesterday only).    Cardiovascular: Negative.    Neurological: Positive for headache.   Hematological: Negative.    Psychiatric/Behavioral: Negative.      /70   Pulse 99   Temp 97.9 °F (36.6 °C) (Oral)   Ht 152.4 cm (60\")   Wt 67.1 kg (148 lb)   SpO2 97%   BMI 28.90 kg/m²     Objective   Physical Exam   HENT:   Right Ear: Tympanic membrane normal.   Left Ear: Tympanic membrane normal.   Nose: Mucosal edema present. No rhinorrhea. Right sinus exhibits no maxillary sinus tenderness and no frontal sinus tenderness. Left sinus exhibits no maxillary sinus tenderness and no frontal sinus tenderness.   Mouth/Throat: Uvula is midline and mucous membranes are normal. Posterior oropharyngeal erythema present.   Neck: Normal range of motion. Neck supple. No tracheal deviation present. No thyromegaly present.   Cardiovascular: Normal rate, regular rhythm and normal heart sounds.    Pulmonary/Chest: Effort normal and breath sounds normal.   Lymphadenopathy:     She has no cervical adenopathy.   Skin: Skin is warm and dry.   Psychiatric: She has a normal mood and affect. Her behavior is normal. Judgment and " thought content normal.   Nursing note and vitals reviewed.    Assessment/Plan   Problems Addressed this Visit     None      Visit Diagnoses     Acute URI    -  Primary        Would treat symptoms, follow up if not settling 1 week. To call ASAP if any worsening.

## 2018-08-01 RX ORDER — FENOFIBRATE 145 MG/1
145 TABLET, COATED ORAL DAILY
Qty: 90 TABLET | Refills: 1 | Status: SHIPPED | OUTPATIENT
Start: 2018-08-01 | End: 2018-11-26 | Stop reason: ALTCHOICE

## 2018-08-20 RX ORDER — LISINOPRIL 20 MG/1
TABLET ORAL
Qty: 90 TABLET | Refills: 1 | Status: SHIPPED | OUTPATIENT
Start: 2018-08-20 | End: 2019-02-11 | Stop reason: SDUPTHER

## 2018-09-19 DIAGNOSIS — F41.9 ANXIETY: ICD-10-CM

## 2018-09-19 RX ORDER — VENLAFAXINE HYDROCHLORIDE 150 MG/1
150 TABLET, EXTENDED RELEASE ORAL DAILY
Qty: 90 EACH | Refills: 1 | Status: SHIPPED | OUTPATIENT
Start: 2018-09-19 | End: 2019-03-09 | Stop reason: SDUPTHER

## 2018-10-03 RX ORDER — OMEPRAZOLE 40 MG/1
CAPSULE, DELAYED RELEASE ORAL
Qty: 180 CAPSULE | Refills: 1 | Status: SHIPPED | OUTPATIENT
Start: 2018-10-03 | End: 2019-03-28 | Stop reason: SDUPTHER

## 2018-10-19 ENCOUNTER — FLU SHOT (OUTPATIENT)
Dept: FAMILY MEDICINE CLINIC | Facility: CLINIC | Age: 58
End: 2018-10-19

## 2018-10-19 DIAGNOSIS — Z23 FLU VACCINE NEED: ICD-10-CM

## 2018-10-19 PROCEDURE — G0008 ADMIN INFLUENZA VIRUS VAC: HCPCS | Performed by: FAMILY MEDICINE

## 2018-10-19 PROCEDURE — 90674 CCIIV4 VAC NO PRSV 0.5 ML IM: CPT | Performed by: FAMILY MEDICINE

## 2018-11-19 ENCOUNTER — OFFICE VISIT (OUTPATIENT)
Dept: FAMILY MEDICINE CLINIC | Facility: CLINIC | Age: 58
End: 2018-11-19

## 2018-11-19 VITALS
BODY MASS INDEX: 28.51 KG/M2 | SYSTOLIC BLOOD PRESSURE: 130 MMHG | TEMPERATURE: 97.5 F | HEART RATE: 95 BPM | DIASTOLIC BLOOD PRESSURE: 70 MMHG | WEIGHT: 146 LBS | OXYGEN SATURATION: 97 %

## 2018-11-19 DIAGNOSIS — Z23 IMMUNIZATION DUE: ICD-10-CM

## 2018-11-19 DIAGNOSIS — M26.622 ARTHRALGIA OF LEFT TEMPOROMANDIBULAR JOINT: ICD-10-CM

## 2018-11-19 DIAGNOSIS — E78.5 DYSLIPIDEMIA: ICD-10-CM

## 2018-11-19 DIAGNOSIS — D84.9 IMMUNOCOMPROMISED STATE (HCC): ICD-10-CM

## 2018-11-19 DIAGNOSIS — I10 BENIGN ESSENTIAL HYPERTENSION: ICD-10-CM

## 2018-11-19 DIAGNOSIS — Z00.00 MEDICARE ANNUAL WELLNESS VISIT, SUBSEQUENT: Primary | ICD-10-CM

## 2018-11-19 DIAGNOSIS — K51.30 ULCERATIVE RECTOSIGMOIDITIS WITHOUT COMPLICATION (HCC): ICD-10-CM

## 2018-11-19 PROBLEM — K62.5 PROCTOCOLITIS WITH RECTAL BLEEDING: Status: RESOLVED | Noted: 2018-01-21 | Resolved: 2018-11-19

## 2018-11-19 PROBLEM — K52.9 PROCTOCOLITIS WITH RECTAL BLEEDING: Status: RESOLVED | Noted: 2018-01-21 | Resolved: 2018-11-19

## 2018-11-19 PROBLEM — D62 ACUTE BLOOD LOSS ANEMIA: Status: RESOLVED | Noted: 2018-01-21 | Resolved: 2018-11-19

## 2018-11-19 LAB
ALBUMIN SERPL-MCNC: 4.4 G/DL (ref 3.5–5.2)
ALBUMIN/GLOB SERPL: 1.4 G/DL
ALP SERPL-CCNC: 76 U/L (ref 39–117)
ALT SERPL-CCNC: 43 U/L (ref 1–33)
AST SERPL-CCNC: 31 U/L (ref 1–32)
BILIRUB SERPL-MCNC: 0.3 MG/DL (ref 0.1–1.2)
BUN SERPL-MCNC: 20 MG/DL (ref 6–20)
BUN/CREAT SERPL: 23.5 (ref 7–25)
CALCIUM SERPL-MCNC: 10.3 MG/DL (ref 8.6–10.5)
CHLORIDE SERPL-SCNC: 102 MMOL/L (ref 98–107)
CHOLEST SERPL-MCNC: 320 MG/DL (ref 0–200)
CO2 SERPL-SCNC: 28.1 MMOL/L (ref 22–29)
CREAT SERPL-MCNC: 0.85 MG/DL (ref 0.57–1)
GLOBULIN SER CALC-MCNC: 3.2 GM/DL
GLUCOSE SERPL-MCNC: 86 MG/DL (ref 65–99)
HDLC SERPL-MCNC: 48 MG/DL (ref 40–60)
LDLC SERPL CALC-MCNC: 229 MG/DL (ref 0–100)
POTASSIUM SERPL-SCNC: 4.6 MMOL/L (ref 3.5–5.2)
PROT SERPL-MCNC: 7.6 G/DL (ref 6–8.5)
SODIUM SERPL-SCNC: 142 MMOL/L (ref 136–145)
TRIGL SERPL-MCNC: 214 MG/DL (ref 0–150)
VLDLC SERPL CALC-MCNC: 42.8 MG/DL (ref 5–40)

## 2018-11-19 PROCEDURE — 99213 OFFICE O/P EST LOW 20 MIN: CPT | Performed by: FAMILY MEDICINE

## 2018-11-19 PROCEDURE — 90732 PPSV23 VACC 2 YRS+ SUBQ/IM: CPT | Performed by: FAMILY MEDICINE

## 2018-11-19 PROCEDURE — 90472 IMMUNIZATION ADMIN EACH ADD: CPT | Performed by: FAMILY MEDICINE

## 2018-11-19 PROCEDURE — G0439 PPPS, SUBSEQ VISIT: HCPCS | Performed by: FAMILY MEDICINE

## 2018-11-19 PROCEDURE — 90471 IMMUNIZATION ADMIN: CPT | Performed by: FAMILY MEDICINE

## 2018-11-19 PROCEDURE — 90632 HEPA VACCINE ADULT IM: CPT | Performed by: FAMILY MEDICINE

## 2018-11-19 RX ORDER — ADALIMUMAB 40MG/0.8ML
KIT SUBCUTANEOUS
COMMUNITY
Start: 2018-10-02 | End: 2019-01-18

## 2018-11-19 RX ORDER — KETOROLAC TROMETHAMINE 5 MG/ML
SOLUTION OPHTHALMIC
Refills: 0 | COMMUNITY
Start: 2018-10-03 | End: 2021-09-14

## 2018-11-19 RX ORDER — BUSPIRONE HYDROCHLORIDE 10 MG/1
TABLET ORAL
COMMUNITY
Start: 2018-09-13 | End: 2018-11-26 | Stop reason: SDUPTHER

## 2018-11-19 NOTE — PROGRESS NOTES
QUICK REFERENCE INFORMATION:  The ABCs of the Annual Wellness Visit    Subsequent Medicare Wellness Visit    HEALTH RISK ASSESSMENT    1960    Recent Hospitalizations:  No hospitalization(s) within the last year..        Current Medical Providers:  Patient Care Team:  Jovita Auguste APRN as PCP - General (Family Medicine)  Jovita Auguste APRN as PCP - Claims Attributed        Smoking Status:  Social History     Tobacco Use   Smoking Status Never Smoker   Smokeless Tobacco Never Used       Alcohol Consumption:  Social History     Substance and Sexual Activity   Alcohol Use No       Depression Screen:   PHQ-2/PHQ-9 Depression Screening 11/19/2018   Little interest or pleasure in doing things 0   Feeling down, depressed, or hopeless 0   Trouble falling or staying asleep, or sleeping too much 1   Feeling tired or having little energy 1   Poor appetite or overeating 0   Feeling bad about yourself - or that you are a failure or have let yourself or your family down 0   Trouble concentrating on things, such as reading the newspaper or watching television 0   Moving or speaking so slowly that other people could have noticed. Or the opposite - being so fidgety or restless that you have been moving around a lot more than usual 0   Thoughts that you would be better off dead, or of hurting yourself in some way 0   Total Score 2   If you checked off any problems, how difficult have these problems made it for you to do your work, take care of things at home, or get along with other people? Somewhat difficult       Health Habits and Functional and Cognitive Screening:  Functional & Cognitive Status 11/19/2018   Do you have difficulty preparing food and eating? No   Do you have difficulty bathing yourself, getting dressed or grooming yourself? No   Do you have difficulty using the toilet? No   Do you have difficulty moving around from place to place? Yes   Do you have trouble with steps or getting out of a bed or a  chair? Yes   In the past year have you fallen or experienced a near fall? Yes   Current Diet Limited Junk Food   Dental Exam Up to date   Eye Exam Up to date   Exercise (times per week) 0 times per week   Current Exercise Activities Include None   Do you need help using the phone?  No   Are you deaf or do you have serious difficulty hearing?  No   Do you need help with transportation? No   Do you need help shopping? No   Do you need help preparing meals?  No   Do you need help with housework?  No   Do you need help with laundry? No   Do you need help taking your medications? No   Do you need help managing money? No   Do you ever drive or ride in a car without wearing a seat belt? No   Have you felt unusual stress, anger or loneliness in the last month? Yes   Who do you live with? Spouse   If you need help, do you have trouble finding someone available to you? No   Have you been bothered in the last four weeks by sexual problems? No   Do you have difficulty concentrating, remembering or making decisions? No           Does the patient have evidence of cognitive impairment? No    Aspirin use counseling: Does not need ASA (and currently is not on it)      Recent Lab Results:  CMP:  Lab Results   Component Value Date    GLU 98 05/21/2018    BUN 17 05/21/2018    CREATININE 0.76 05/21/2018    EGFRIFNONA 78 05/21/2018    EGFRIFAFRI 95 05/21/2018    BCR 22.4 05/21/2018     05/21/2018    K 4.4 05/21/2018    CO2 26.9 05/21/2018    CALCIUM 9.6 05/21/2018    PROTENTOTREF 7.3 05/21/2018    ALBUMIN 4.20 05/21/2018    LABGLOBREF 3.1 05/21/2018    LABIL2 1.4 05/21/2018    BILITOT 0.3 05/21/2018    ALKPHOS 104 05/21/2018    AST 47 (H) 05/21/2018    ALT 54 (H) 05/21/2018     Lipid Panel:  Lab Results   Component Value Date    TRIG 403 (H) 05/21/2018    HDL 39 (L) 05/21/2018    VLDL CANCELED 05/21/2018     HbA1c:       Visual Acuity:  No exam data present    Age-appropriate Screening Schedule:  Refer to the list below for future  screening recommendations based on patient's age, sex and/or medical conditions. Orders for these recommended tests are listed in the plan section. The patient has been provided with a written plan.    Health Maintenance   Topic Date Due   • ZOSTER VACCINE (1 of 2) 09/16/2009   • PAP SMEAR  05/21/2018   • LIPID PANEL  05/21/2019   • MAMMOGRAM  01/02/2020   • TDAP/TD VACCINES (2 - Td) 08/16/2026   • COLONOSCOPY  12/28/2026   • INFLUENZA VACCINE  Completed        Subjective   History of Present Illness    Marizol Park is a 58 y.o. female who presents for an Subsequent Wellness Visit.    The following portions of the patient's history were reviewed and updated as appropriate: allergies, current medications, past family history, past medical history, past social history, past surgical history and problem list.    Outpatient Medications Prior to Visit   Medication Sig Dispense Refill   • adalimumab (HUMIRA) 40 MG/0.8ML Prefilled Syringe Kit injection Inject 40 mg under the skin 1 (One) Time. Once every 2 weeks     • busPIRone (BUSPAR) 10 MG tablet      • fenofibrate (TRICOR) 145 MG tablet Take 1 tablet by mouth Daily. 90 tablet 1   • HUMIRA PEN 40 MG/0.8ML Pen-injector Kit      • ketorolac (ACULAR) 0.5 % ophthalmic solution PLACE 1 DROP IN OS TID FOR 3 DAYS THEN STOP  0   • lisinopril (PRINIVIL,ZESTRIL) 20 MG tablet TAKE 1 TABLET DAILY FOR    BLOOD PRESSURE 90 tablet 1   • Loratadine 10 MG capsule Take 10 mg by mouth Daily.     • omeprazole (priLOSEC) 40 MG capsule TAKE 1 CAPSULE TWICE DAILY 180 capsule 1   • Pediatric Multivitamins-Iron (FLINTSTONES PLUS IRON PO) Take 2 tablets by mouth Daily.     • venlafaxine (EFFEXOR) 150 MG tablet sustained-release 24 hour 24 hr tablet Take 1 tablet by mouth Daily. 90 each 1   • busPIRone (BUSPAR) 7.5 MG tablet      • mesalamine (LIALDA) 1.2 G EC tablet Take 2 tablets by mouth Daily With Dinner. Currently weaking     • lisinopril (PRINIVIL,ZESTRIL) 20 MG tablet Take 20 mg by mouth  Daily.       No facility-administered medications prior to visit.        Patient Active Problem List   Diagnosis   • Acute post-traumatic stress disorder   • Anxiety   • Benign essential hypertension   • Excessive cerumen in ear canal   • Chest wall pain   • Cough   • Dyslipidemia   • External hemorrhoids   • Gastroesophageal reflux disease   • Hemorrhoids   • Panic disorder without agoraphobia   • Peripheral neuropathy   • Ulcerative colitis (CMS/HCC)   • Ulnar neuropathy   • Immunocompromised state (CMS/[facility])       Advance Care Planning:  has NO advance directive - information provided to the patient today    Identification of Risk Factors:  Risk factors include: weight .    Review of Systems   Respiratory: Negative for shortness of breath.    Cardiovascular: Negative for chest pain, palpitations, orthopnea and PND.       Compared to one year ago, the patient feels her physical health is the same.  Compared to one year ago, the patient feels her mental health is the same.    Objective     Physical Exam   Constitutional: She appears well-developed and well-nourished.   HENT:   Head: Normocephalic and atraumatic.   Left TMJ subluxes, reproduced pain with palpation.   Neck: Neck supple. No JVD present. No thyromegaly present.   Cardiovascular: Normal rate, regular rhythm and normal heart sounds. Exam reveals no gallop and no friction rub.   No murmur heard.  Pulmonary/Chest: Effort normal and breath sounds normal. No respiratory distress. She has no wheezes. She has no rales.   Abdominal: Soft. Bowel sounds are normal. She exhibits no distension. There is no tenderness. There is no rebound and no guarding.   Musculoskeletal: She exhibits no edema.   Neurological: She is alert.   Skin: Skin is warm and dry.   Psychiatric: She has a normal mood and affect. Her behavior is normal.   Nursing note and vitals reviewed.      Vitals:    11/19/18 0919   BP: 130/70   Pulse: 95   Temp: 97.5 °F (36.4 °C)   SpO2: 97%   Weight:  66.2 kg (146 lb)       Patient's Body mass index is 28.51 kg/m². BMI is above normal parameters. Recommendations include: educational material.      Assessment/Plan   Patient Self-Management and Personalized Health Advice  The patient has been provided with information about: diet and exercise and preventive services including:   · Advance directive.    Visit Diagnoses:    ICD-10-CM ICD-9-CM   1. Medicare annual wellness visit, subsequent Z00.00 V70.0   2. Benign essential hypertension I10 401.1   3. Immunocompromised state (CMS/[facility]) D84.9 279.3   4. Ulcerative rectosigmoiditis without complication (CMS/[facility]) K51.30 556.3   5. Dyslipidemia E78.5 272.4   6. Immunization due Z23 V05.9   7. Arthralgia of left temporomandibular joint M26.622 524.62       Orders Placed This Encounter   Procedures   • Hepatitis A Vaccine Adult IM   • Pneumococcal Polysaccharide Vaccine 23-Valent (PPSV23) Greater Than or Equal To 3yo Subcutaneous / IM   • Comprehensive Metabolic Panel   • Lipid Panel       Outpatient Encounter Medications as of 11/19/2018   Medication Sig Dispense Refill   • adalimumab (HUMIRA) 40 MG/0.8ML Prefilled Syringe Kit injection Inject 40 mg under the skin 1 (One) Time. Once every 2 weeks     • busPIRone (BUSPAR) 10 MG tablet      • fenofibrate (TRICOR) 145 MG tablet Take 1 tablet by mouth Daily. 90 tablet 1   • HUMIRA PEN 40 MG/0.8ML Pen-injector Kit      • ketorolac (ACULAR) 0.5 % ophthalmic solution PLACE 1 DROP IN OS TID FOR 3 DAYS THEN STOP  0   • lisinopril (PRINIVIL,ZESTRIL) 20 MG tablet TAKE 1 TABLET DAILY FOR    BLOOD PRESSURE 90 tablet 1   • Loratadine 10 MG capsule Take 10 mg by mouth Daily.     • omeprazole (priLOSEC) 40 MG capsule TAKE 1 CAPSULE TWICE DAILY 180 capsule 1   • Pediatric Multivitamins-Iron (FLINTSTONES PLUS IRON PO) Take 2 tablets by mouth Daily.     • venlafaxine (EFFEXOR) 150 MG tablet sustained-release 24 hour 24 hr tablet Take 1 tablet by mouth Daily. 90 each 1   • [DISCONTINUED]  busPIRone (BUSPAR) 7.5 MG tablet      • [DISCONTINUED] mesalamine (LIALDA) 1.2 G EC tablet Take 2 tablets by mouth Daily With Dinner. Currently weaking     • [DISCONTINUED] lisinopril (PRINIVIL,ZESTRIL) 20 MG tablet Take 20 mg by mouth Daily.       No facility-administered encounter medications on file as of 11/19/2018.        Reviewed use of high risk medication in the elderly: yes  Reviewed for potential of harmful drug interactions in the elderly: yes    Follow Up:  Return in about 6 months (around 5/19/2019), or if symptoms worsen or fail to improve.     An After Visit Summary and PPPS with all of these plans were given to the patient.      Cc:  Htn, dyslipidemia, chronic disease mgmt  Hypertension   This is a chronic problem. The current episode started more than 1 year ago. The problem is unchanged. The problem is controlled. Pertinent negatives include no chest pain, orthopnea, palpitations, peripheral edema, PND or shortness of breath. There are no associated agents to hypertension. Risk factors for coronary artery disease include dyslipidemia and post-menopausal state. Past treatments include ACE inhibitors. Current antihypertension treatment includes ACE inhibitors. The current treatment provides moderate improvement. There are no compliance problems.  There is no history of kidney disease, CAD/MI, CVA or heart failure. There is no history of chronic renal disease.   Hyperlipidemia   This is a chronic problem. The current episode started more than 1 year ago. The problem is controlled. Recent lipid tests were reviewed and are normal. She has no history of chronic renal disease. Pertinent negatives include no chest pain or shortness of breath. Current antihyperlipidemic treatment includes fibric acid derivatives. The current treatment provides moderate improvement of lipids. There are no compliance problems.  Risk factors for coronary artery disease include dyslipidemia, hypertension and post-menopausal.      Patient with hx of UC, stable on humira.  Pain over left TMJ    Non-smoker  Review of Systems   Cardiovascular: Negative for chest pain, orthopnea, palpitations and paroxysmal nocturnal dyspnea.   Respiratory: Negative for shortness of breath.       Physical Exam   Constitutional: She appears well-developed and well-nourished.   HENT:   Head: Normocephalic and atraumatic.   Left TMJ subluxes, reproduced pain with palpation.   Neck: Neck supple. No JVD present. No thyromegaly present.   Cardiovascular: Normal rate, regular rhythm and normal heart sounds. Exam reveals no gallop and no friction rub.   No murmur heard.  Pulmonary/Chest: Effort normal and breath sounds normal. No respiratory distress. She has no wheezes. She has no rales.   Abdominal: Soft. Bowel sounds are normal. She exhibits no distension. There is no tenderness. There is no rebound and no guarding.   Musculoskeletal: She exhibits no edema.   Neurological: She is alert.   Skin: Skin is warm and dry.   Psychiatric: She has a normal mood and affect. Her behavior is normal.   Nursing note and vitals reviewed.     Diagnoses and all orders for this visit:    Benign essential hypertension  -     Comprehensive Metabolic Panel  -     Lipid Panel    Immunocompromised state (CMS/HCC)    Ulcerative rectosigmoiditis without complication (CMS/HCC)    Dyslipidemia  -     Comprehensive Metabolic Panel  -     Lipid Panel    Immunization due  -     Hepatitis A Vaccine Adult IM  -     Pneumococcal Polysaccharide Vaccine 23-Valent (PPSV23) Greater Than or Equal To 1yo Subcutaneous / IM  TMJ arthralgia    -hypertension - controlled, continue medications  -hypertg - continue fibrate  -avoid chewy foods and gum for TMJ;  apap for pain prn  -UC - on humira;  F/u with GI;  UC stable at this time    F/u 6 months and prn

## 2018-11-19 NOTE — PATIENT INSTRUCTIONS
Medicare Wellness  Personal Prevention Plan of Service     Date of Office Visit:  2018  Encounter Provider:  Ras Mcconnell DO  Place of Service:  [facility]  Patient Name: Marizol Park  :  1960    As part of the Medicare Wellness portion of your visit today, we are providing you with this personalized preventive plan of services (PPPS). This plan is based upon recommendations of the [place] Preventive Services Task Force (USPSTF) and the Advisory Committee on Immunization Practices (ACIP).    This lists the preventive care services that should be considered, and provides dates of when you are due. Items listed as completed are up-to-date and do not require any further intervention.    Health Maintenance   Topic Date Due   • ZOSTER VACCINE (1 of 2) 2009   • PAP SMEAR  2018   • LIPID PANEL  2019   • MEDICARE ANNUAL WELLNESS  2019   • MAMMOGRAM  2020   • TDAP/TD VACCINES (2 - Td) 2026   • COLONOSCOPY  2026   • INFLUENZA VACCINE  Completed   • HEPATITIS C SCREENING  Addressed       No orders of the defined types were placed in this encounter.      Return in about 6 months (around 2019), or if symptoms worsen or fail to improve.      Calorie Counting for Weight Loss  Calories are units of energy. Your body needs a certain amount of calories from food to keep you going throughout the day. When you eat more calories than your body needs, your body stores the extra calories as fat. When you eat fewer calories than your body needs, your body burns fat to get the energy it needs.  Calorie counting means keeping track of how many calories you eat and drink each day. Calorie counting can be helpful if you need to lose weight. If you make sure to eat fewer calories than your body needs, you should lose weight. Ask your health care provider what a healthy weight is for you.  For calorie counting to work, you will need to eat  the right number of calories in a day in order to lose a healthy amount of weight per week. A dietitian can help you determine how many calories you need in a day and will give you suggestions on how to reach your calorie goal.  · A healthy amount of weight to lose per week is usually 1-2 lb (0.5-0.9 kg). This usually means that your daily calorie intake should be reduced by 500-750 calories.  · Eating 1,200 - 1,500 calories per day can help most women lose weight.  · Eating 1,500 - 1,800 calories per day can help most men lose weight.    What do I need to know about calorie counting?  In order to meet your daily calorie goal, you will need to:  · Find out how many calories are in each food you would like to eat. Try to do this before you eat.  · Decide how much of the food you plan to eat.  · Write down what you ate and how many calories it had. Doing this is called keeping a food log.    To successfully lose weight, it is important to balance calorie counting with a healthy lifestyle that includes regular activity. Aim for 150 minutes of moderate exercise (such as walking) or 75 minutes of vigorous exercise (such as running) each week.  Where do I find calorie information?    The number of calories in a food can be found on a Nutrition Facts label. If a food does not have a Nutrition Facts label, try to look up the calories online or ask your dietitian for help.  Remember that calories are listed per serving. If you choose to have more than one serving of a food, you will have to multiply the calories per serving by the amount of servings you plan to eat. For example, the label on a package of bread might say that a serving size is 1 slice and that there are 90 calories in a serving. If you eat 1 slice, you will have eaten 90 calories. If you eat 2 slices, you will have eaten 180 calories.  How do I keep a food log?  Immediately after each meal, record the following information in your food log:  · What you ate.  "Don't forget to include toppings, sauces, and other extras on the food.  · How much you ate. This can be measured in cups, ounces, or number of items.  · How many calories each food and drink had.  · The total number of calories in the meal.    Keep your food log near you, such as in a small notebook in your pocket, or use a mobile audelia or website. Some programs will calculate calories for you and show you how many calories you have left for the day to meet your goal.  What are some calorie counting tips?  · Use your calories on foods and drinks that will fill you up and not leave you hungry:  ? Some examples of foods that fill you up are nuts and nut butters, vegetables, lean proteins, and high-fiber foods like whole grains. High-fiber foods are foods with more than 5 g fiber per serving.  ? Drinks such as sodas, specialty coffee drinks, alcohol, and juices have a lot of calories, yet do not fill you up.  · Eat nutritious foods and avoid empty calories. Empty calories are calories you get from foods or beverages that do not have many vitamins or protein, such as candy, sweets, and soda. It is better to have a nutritious high-calorie food (such as an avocado) than a food with few nutrients (such as a bag of chips).  · Know how many calories are in the foods you eat most often. This will help you calculate calorie counts faster.  · Pay attention to calories in drinks. Low-calorie drinks include water and unsweetened drinks.  · Pay attention to nutrition labels for \"low fat\" or \"fat free\" foods. These foods sometimes have the same amount of calories or more calories than the full fat versions. They also often have added sugar, starch, or salt, to make up for flavor that was removed with the fat.  · Find a way of tracking calories that works for you. Get creative. Try different apps or programs if writing down calories does not work for you.  What are some portion control tips?  · Know how many calories are in a " serving. This will help you know how many servings of a certain food you can have.  · Use a measuring cup to measure serving sizes. You could also try weighing out portions on a kitchen scale. With time, you will be able to estimate serving sizes for some foods.  · Take some time to put servings of different foods on your favorite plates, bowls, and cups so you know what a serving looks like.  · Try not to eat straight from a bag or box. Doing this can lead to overeating. Put the amount you would like to eat in a cup or on a plate to make sure you are eating the right portion.  · Use smaller plates, glasses, and bowls to prevent overeating.  · Try not to multitask (for example, watch TV or use your computer) while eating. If it is time to eat, sit down at a table and enjoy your food. This will help you to know when you are full. It will also help you to be aware of what you are eating and how much you are eating.  What are tips for following this plan?  Reading food labels  · Check the calorie count compared to the serving size. The serving size may be smaller than what you are used to eating.  · Check the source of the calories. Make sure the food you are eating is high in vitamins and protein and low in saturated and trans fats.  Shopping  · Read nutrition labels while you shop. This will help you make healthy decisions before you decide to purchase your food.  · Make a grocery list and stick to it.  Cooking  · Try to cook your favorite foods in a healthier way. For example, try baking instead of frying.  · Use low-fat dairy products.  Meal planning  · Use more fruits and vegetables. Half of your plate should be fruits and vegetables.  · Include lean proteins like poultry and fish.  How do I count calories when eating out?  · Ask for smaller portion sizes.  · Consider sharing an entree and sides instead of getting your own entree.  · If you get your own entree, eat only half. Ask for a box at the beginning of your  meal and put the rest of your entree in it so you are not tempted to eat it.  · If calories are listed on the menu, choose the lower calorie options.  · Choose dishes that include vegetables, fruits, whole grains, low-fat dairy products, and lean protein.  · Choose items that are boiled, broiled, grilled, or steamed. Stay away from items that are buttered, battered, fried, or served with cream sauce. Items labeled “crispy” are usually fried, unless stated otherwise.  · Choose water, low-fat milk, unsweetened iced tea, or other drinks without added sugar. If you want an alcoholic beverage, choose a lower calorie option such as a glass of wine or light beer.  · Ask for dressings, sauces, and syrups on the side. These are usually high in calories, so you should limit the amount you eat.  · If you want a salad, choose a garden salad and ask for grilled meats. Avoid extra toppings like zacarias, cheese, or fried items. Ask for the dressing on the side, or ask for olive oil and vinegar or lemon to use as dressing.  · Estimate how many servings of a food you are given. For example, a serving of cooked rice is ½ cup or about the size of half a baseball. Knowing serving sizes will help you be aware of how much food you are eating at restaurants. The list below tells you how big or small some common portion sizes are based on everyday objects:  ? 1 oz--4 stacked dice.  ? 3 oz--1 deck of cards.  ? 1 tsp--1 die.  ? 1 Tbsp--½ a ping-pong ball.  ? 2 Tbsp--1 ping-pong ball.  ? ½ cup--½ baseball.  ? 1 cup--1 baseball.  Summary  · Calorie counting means keeping track of how many calories you eat and drink each day. If you eat fewer calories than your body needs, you should lose weight.  · A healthy amount of weight to lose per week is usually 1-2 lb (0.5-0.9 kg). This usually means reducing your daily calorie intake by 500-750 calories.  · The number of calories in a food can be found on a Nutrition Facts label. If a food does not have  a Nutrition Facts label, try to look up the calories online or ask your dietitian for help.  · Use your calories on foods and drinks that will fill you up, and not on foods and drinks that will leave you hungry.  · Use smaller plates, glasses, and bowls to prevent overeating.  This information is not intended to replace advice given to you by your health care provider. Make sure you discuss any questions you have with your health care provider.  Document Released: 12/18/2006 Document Revised: 11/17/2017 Document Reviewed: 11/17/2017  [employer] Interactive Patient Education © 2018 [employer]    Advance Directive  Advance directives are legal documents that let you make choices ahead of time about your health care and medical treatment in case you become unable to communicate for yourself. Advance directives are a way for you to communicate your wishes to family, friends, and health care providers. This can help convey your decisions about end-of-life care if you become unable to communicate.  Discussing and writing advance directives should happen over time rather than all at once. Advance directives can be changed depending on your situation and what you want, even after you have signed the advance directives.  If you do not have an advance directive, some states assign family decision makers to act on your behalf based on how closely you are related to them. Each state has its own laws regarding advance directives. You may want to check with your health care provider, , or state representative about the laws in your state. There are different types of advance directives, such as:  · Medical power of .  · Living will.  · Do not resuscitate (DNR) or do not attempt resuscitation (DNAR) order.    Health care proxy and medical power of   A health care proxy, also called a health care agent, is a person who is appointed to make medical decisions for you in cases in which you are unable to make  the decisions yourself. Generally, people choose someone they know well and trust to represent their preferences. Make sure to ask this person for an agreement to act as your proxy. A proxy may have to exercise judgment in the event of a medical decision for which your wishes are not known.  A medical power of  is a legal document that names your health care proxy. Depending on the laws in your state, after the document is written, it may also need to be:  · Signed.  · Notarized.  · Dated.  · Copied.  · Witnessed.  · Incorporated into your medical record.    You may also want to appoint someone to manage your financial affairs in a situation in which you are unable to do so. This is called a durable power of  for finances. It is a separate legal document from the durable power of  for health care. You may choose the same person or someone different from your health care proxy to act as your agent in financial matters.  If you do not appoint a proxy, or if there is a concern that the proxy is not acting in your best interests, a court-appointed guardian may be designated to act on your behalf.  Living will  A living will is a set of instructions documenting your wishes about medical care when you cannot express them yourself. Health care providers should keep a copy of your living will in your medical record. You may want to give a copy to family members or friends. To alert caregivers in case of an emergency, you can place a card in your wallet to let them know that you have a living will and where they can find it. A living will is used if you become:  · Terminally ill.  · Incapacitated.  · Unable to communicate or make decisions.    Items to consider in your living will include:  · The use or non-use of life-sustaining equipment, such as dialysis machines and breathing machines (ventilators).  · A DNR or DNAR order, which is the instruction not to use cardiopulmonary resuscitation (CPR) if  breathing or heartbeat stops.  · The use or non-use of tube feeding.  · Withholding of food and fluids.  · Comfort (palliative) care when the goal becomes comfort rather than a cure.  · Organ and tissue donation.    A living will does not give instructions for distributing your money and property if you should pass away. It is recommended that you seek the advice of a  when writing a will. Decisions about taxes, beneficiaries, and asset distribution will be legally binding. This process can relieve your family and friends of any concerns surrounding disputes or questions that may come up about the distribution of your assets.  DNR or DNAR  A DNR or DNAR order is a request not to have CPR in the event that your heart stops beating or you stop breathing. If a DNR or DNAR order has not been made and shared, a health care provider will try to help any patient whose heart has stopped or who has stopped breathing. If you plan to have surgery, talk with your health care provider about how your DNR or DNAR order will be followed if problems occur.  Summary  · Advance directives are the legal documents that allow you to make choices ahead of time about your health care and medical treatment in case you become unable to communicate for yourself.  · The process of discussing and writing advance directives should happen over time. You can change the advance directives, even after you have signed them.  · Advance directives include DNR or DNAR orders, living esqueda, and designating an agent as your medical power of .  This information is not intended to replace advice given to you by your health care provider. Make sure you discuss any questions you have with your health care provider.  Document Released: 03/26/2009 Document Revised: 11/06/2017 Document Reviewed: 11/06/2017  [employer] Interactive Patient Education © 2017 [employer]

## 2018-11-23 NOTE — PROGRESS NOTES
Call results to patient.  Cholesterol is very high, thought triglycerides are better.  I would suggest stopping fenofibrate and start crestor 40mg po daily.

## 2018-11-26 RX ORDER — ROSUVASTATIN CALCIUM 40 MG/1
40 TABLET, COATED ORAL DAILY
Qty: 30 TABLET | Refills: 5 | Status: SHIPPED | OUTPATIENT
Start: 2018-11-26 | End: 2019-01-18

## 2018-11-26 RX ORDER — BUSPIRONE HYDROCHLORIDE 10 MG/1
10 TABLET ORAL DAILY
Qty: 30 TABLET | Refills: 5 | Status: SHIPPED | OUTPATIENT
Start: 2018-11-26 | End: 2019-11-19 | Stop reason: SDUPTHER

## 2018-12-03 RX ORDER — BUSPIRONE HYDROCHLORIDE 7.5 MG/1
TABLET ORAL
Qty: 180 TABLET | Refills: 1 | Status: SHIPPED | OUTPATIENT
Start: 2018-12-03 | End: 2019-01-18

## 2018-12-04 NOTE — PROGRESS NOTES
Marek Park is a 57 y.o. female. Presents today for   Chief Complaint   Patient presents with   • Rash     all over       Rash   This is a new (Started after remicade infusion.  no other new meds, detergents or foods.) problem. The current episode started in the past 7 days. The problem has been gradually worsening since onset. The rash is diffuse. The rash is characterized by itchiness, redness and swelling. She was exposed to a new medication (remicade only thing new). Associated symptoms include facial edema. Pertinent negatives include no congestion, cough, fever, joint pain, rhinorrhea, shortness of breath, sore throat or vomiting. (No tongue or throat swelling) Past treatments include oral steroids (benadryl). The treatment provided mild relief.       Review of Systems   Constitutional: Negative for fever.   HENT: Negative for congestion, rhinorrhea and sore throat.    Respiratory: Negative for cough and shortness of breath.    Gastrointestinal: Negative for vomiting.   Musculoskeletal: Negative for joint pain.   Skin: Positive for rash.       The following portions of the patient's history were reviewed and updated as appropriate: allergies, current medications, past medical history and problem list.    Patient Active Problem List   Diagnosis   • Acute post-traumatic stress disorder   • Anxiety   • Benign essential hypertension   • Excessive cerumen in ear canal   • Chest wall pain   • Cough   • Dyslipidemia   • External hemorrhoids   • Fracture, thoracic vertebra, compression   • Gastroesophageal reflux disease   • Grief   • Hemorrhoids   • Night sweats   • Panic disorder without agoraphobia   • Peripheral neuropathy   • Skin lesion   • Ulcerative colitis   • Ulnar neuropathy   • Acute blood loss anemia   • Immunocompromised state   • Proctocolitis with rectal bleeding       Allergies   Allergen Reactions   • Remicade [Infliximab] Anaphylaxis     Facial swelling, hives wide spread       Current  Telephone Encounter by Linh Galvez CMA at 04/10/18 12:31 PM     Author:  Linh Galvez CMA Service:  (none) Author Type:  Certified Medical Assistant     Filed:  04/10/18 12:31 PM Encounter Date:  4/3/2018 Status:  Signed     :  Linh Galvez CMA (Certified Medical Assistant)            Ortho order placed.[BR1.1M]    Patient notified.[BR1.1T]       Revision History        User Key Date/Time User Provider Type Action    > BR1.1 04/10/18 12:31 PM Linh Galvez CMA Certified Medical Assistant Sign    M - Manual, T - Template             "Outpatient Prescriptions on File Prior to Visit   Medication Sig Dispense Refill   • busPIRone (BUSPAR) 7.5 MG tablet Take 1 tablet by mouth 2 (Two) Times a Day. 60 tablet 6   • lisinopril (PRINIVIL,ZESTRIL) 20 MG tablet Take 20 mg by mouth Daily.     • Loratadine 10 MG capsule Take 10 mg by mouth Daily.     • mesalamine (LIALDA) 1.2 G EC tablet Take 4 tablets by mouth daily with dinner.     • omeprazole (priLOSEC) 40 MG capsule Take 1 capsule by mouth 2 (Two) Times a Day. 180 capsule 1   • Pediatric Multivitamins-Iron (FLINTSTONES PLUS IRON PO) Take 2 tablets by mouth Daily.     • predniSONE (DELTASONE) 10 MG tablet 40mg po daily x 7 days, then  30mg po daily x 7 days, then  20mg po daily x 7 days, then  10mg po daily x 7 days, then   STOP     • Probiotic Product (PROBIOTIC ADVANCED PO) Take 1 tablet by mouth Daily.     • venlafaxine (EFFEXOR) 150 MG tablet sustained-release 24 hour 24 hr tablet Take 1 tablet by mouth Daily. 90 each 1     No current facility-administered medications on file prior to visit.        Objective   Vitals:    03/16/18 0906   BP: 128/80   BP Location: Right arm   Patient Position: Sitting   Cuff Size: Adult   Pulse: 98   SpO2: 99%   Weight: 66.2 kg (146 lb)   Height: 152.4 cm (60\")       Physical Exam   Constitutional: She appears well-developed and well-nourished.   HENT:   Head: Normocephalic and atraumatic.   Mouth/Throat: Oropharynx is clear and moist.   Neck: Neck supple. No JVD present. No thyromegaly present.   Cardiovascular: Normal rate, regular rhythm and normal heart sounds.  Exam reveals no gallop and no friction rub.    No murmur heard.  Pulmonary/Chest: Effort normal and breath sounds normal. No respiratory distress. She has no wheezes. She has no rales.   Abdominal: Soft. Bowel sounds are normal. She exhibits no distension. There is no tenderness. There is no rebound and no guarding.   Musculoskeletal: She exhibits no edema.   Neurological: She is alert.   Skin: Skin is warm " and dry. Rash noted. Rash is urticarial.   Facial swelling around ears;  Wide spread hives, red plaques.     Psychiatric: She has a normal mood and affect. Her behavior is normal.   Nursing note and vitals reviewed.      Assessment/Plan   Marizol was seen today for rash.    Diagnoses and all orders for this visit:    Allergic reaction, initial encounter  -     methylPREDNISolone sodium succinate (SOLU-Medrol) injection 125 mg; Inject 2 mL into the shoulder, thigh, or buttocks 1 (One) Time.  -     hydrOXYzine (ATARAX) 25 MG tablet; 1 po every 6 hours x 2 days, then as needed hives/itching  -     raNITIdine (ZANTAC) 150 MG tablet; Take 1 tablet by mouth 2 (Two) Times a Day for 10 days.    Hives  -     methylPREDNISolone sodium succinate (SOLU-Medrol) injection 125 mg; Inject 2 mL into the shoulder, thigh, or buttocks 1 (One) Time.  -     hydrOXYzine (ATARAX) 25 MG tablet; 1 po every 6 hours x 2 days, then as needed hives/itching  -     raNITIdine (ZANTAC) 150 MG tablet; Take 1 tablet by mouth 2 (Two) Times a Day for 10 days.      Patient reported chest tightness at 10 minutes;  SaO2 normal;  Reported felt like indigestion having more since on prednisone.  Gave GI Cocktail PO x1 with rapid resolution of symptoms.  Gave zantac to take as anti-histamine.   Vitals rechecked and normal.  Patient discharged to home in stable condition    GO ER If difficulty breathing, tongue swelling or worsening condition.         -Follow up: Prn - RTC if worse or no improvement.          Current Outpatient Prescriptions:   •  busPIRone (BUSPAR) 7.5 MG tablet, Take 1 tablet by mouth 2 (Two) Times a Day., Disp: 60 tablet, Rfl: 6  •  lisinopril (PRINIVIL,ZESTRIL) 20 MG tablet, Take 20 mg by mouth Daily., Disp: , Rfl:   •  Loratadine 10 MG capsule, Take 10 mg by mouth Daily., Disp: , Rfl:   •  mesalamine (LIALDA) 1.2 G EC tablet, Take 4 tablets by mouth daily with dinner., Disp: , Rfl:   •  omeprazole (priLOSEC) 40 MG capsule, Take 1 capsule by  mouth 2 (Two) Times a Day., Disp: 180 capsule, Rfl: 1  •  Pediatric Multivitamins-Iron (FLINTSTONES PLUS IRON PO), Take 2 tablets by mouth Daily., Disp: , Rfl:   •  predniSONE (DELTASONE) 10 MG tablet, 40mg po daily x 7 days, then 30mg po daily x 7 days, then 20mg po daily x 7 days, then 10mg po daily x 7 days, then  STOP, Disp: , Rfl:   •  Probiotic Product (PROBIOTIC ADVANCED PO), Take 1 tablet by mouth Daily., Disp: , Rfl:   •  venlafaxine (EFFEXOR) 150 MG tablet sustained-release 24 hour 24 hr tablet, Take 1 tablet by mouth Daily., Disp: 90 each, Rfl: 1  •  hydrOXYzine (ATARAX) 25 MG tablet, 1 po every 6 hours x 2 days, then as needed hives/itching, Disp: 40 tablet, Rfl: 0  •  raNITIdine (ZANTAC) 150 MG tablet, Take 1 tablet by mouth 2 (Two) Times a Day for 10 days., Disp: 20 tablet, Rfl: 0    Current Facility-Administered Medications:   •  methylPREDNISolone sodium succinate (SOLU-Medrol) injection 125 mg, 125 mg, Intramuscular, Once, Ras Mcconnell DO

## 2018-12-05 ENCOUNTER — TELEPHONE (OUTPATIENT)
Dept: FAMILY MEDICINE CLINIC | Facility: CLINIC | Age: 58
End: 2018-12-05

## 2018-12-05 NOTE — TELEPHONE ENCOUNTER
Patient notified and appt scheduled. She states she has taken Excedrin migraine in the past with no issue and will take this today.

## 2018-12-05 NOTE — TELEPHONE ENCOUNTER
Cysts should not be caused by humira, suggest appt. Ok for migraine medication if have used in the past.

## 2018-12-07 ENCOUNTER — TELEPHONE (OUTPATIENT)
Dept: FAMILY MEDICINE CLINIC | Facility: CLINIC | Age: 58
End: 2018-12-07

## 2019-01-18 ENCOUNTER — OFFICE VISIT (OUTPATIENT)
Dept: FAMILY MEDICINE CLINIC | Facility: CLINIC | Age: 59
End: 2019-01-18

## 2019-01-18 VITALS
BODY MASS INDEX: 28.47 KG/M2 | DIASTOLIC BLOOD PRESSURE: 64 MMHG | WEIGHT: 145 LBS | SYSTOLIC BLOOD PRESSURE: 128 MMHG | HEART RATE: 90 BPM | OXYGEN SATURATION: 99 % | HEIGHT: 60 IN | TEMPERATURE: 98.3 F

## 2019-01-18 DIAGNOSIS — J40 BRONCHITIS: ICD-10-CM

## 2019-01-18 DIAGNOSIS — R68.89 FLU-LIKE SYMPTOMS: Primary | ICD-10-CM

## 2019-01-18 PROCEDURE — 99213 OFFICE O/P EST LOW 20 MIN: CPT | Performed by: NURSE PRACTITIONER

## 2019-01-18 PROCEDURE — 87804 INFLUENZA ASSAY W/OPTIC: CPT | Performed by: NURSE PRACTITIONER

## 2019-01-18 RX ORDER — LACTOBACILLUS ACIDOPH-L.BULGARICUS 1 MILLION CELL CHEWABLE TABLET 1MM CELL
1 TABLET,CHEWABLE ORAL 3 TIMES DAILY
COMMUNITY
Start: 2018-01-23 | End: 2019-01-18

## 2019-01-18 RX ORDER — METHYLPREDNISOLONE 4 MG/1
TABLET ORAL
Qty: 21 TABLET | Refills: 0 | Status: SHIPPED | OUTPATIENT
Start: 2019-01-18 | End: 2019-02-21

## 2019-01-18 NOTE — PROGRESS NOTES
"Marek Park is a 58 y.o. female who presents c/o chills, cough, body aches that started last night.     History of Present Illness   No fever, but had chills and chest burning last night, along with ST.   The following portions of the patient's history were reviewed and updated as appropriate: allergies, current medications, past family history, past medical history, past social history, past surgical history and problem list.    Review of Systems   Constitutional: Positive for chills. Negative for fever.   HENT: Positive for sore throat. Negative for congestion and ear pain.    Respiratory: Positive for cough (rare).    Musculoskeletal: Positive for arthralgias (body aches).   Neurological: Positive for headache (tylenol to address).   Hematological: Negative.    Psychiatric/Behavioral: Negative.      /64   Pulse 90   Temp 98.3 °F (36.8 °C) (Oral)   Ht 152.4 cm (60\")   Wt 65.8 kg (145 lb)   SpO2 99%   BMI 28.32 kg/m²     Objective   Physical Exam   Constitutional: She appears well-developed and well-nourished.   HENT:   Right Ear: Tympanic membrane normal.   Left Ear: Tympanic membrane normal.   Nose: Nose normal. Right sinus exhibits no maxillary sinus tenderness and no frontal sinus tenderness. Left sinus exhibits no maxillary sinus tenderness and no frontal sinus tenderness.   Mouth/Throat: Uvula is midline and mucous membranes are normal. Posterior oropharyngeal erythema present.   Neck: Normal range of motion. Neck supple. No tracheal deviation present. No thyromegaly present.   Cardiovascular: Normal rate, regular rhythm and normal heart sounds.   Pulmonary/Chest: Effort normal and breath sounds normal.   Lymphadenopathy:     She has no cervical adenopathy.   Psychiatric: She has a normal mood and affect. Her behavior is normal. Judgment and thought content normal.   Nursing note and vitals reviewed.    Assessment/Plan   Problems Addressed this Visit     None      Visit Diagnoses     " Flu-like symptoms    -  Primary    Relevant Orders    POCT Influenza A/B (Completed)    Bronchitis        Relevant Medications    MethylPREDNISolone (MEDROL) 4 MG tablet        Results for orders placed or performed in visit on 01/18/19   POCT Influenza A/B   Result Value Ref Range    Rapid Influenza A Ag Negative Negative    Rapid Influenza B Ag Negative Negative    Internal Control Passed Passed    Lot Number [id number]     Expiration Date 08/01/2020      Bronchitis--likely viral syndrome, recommend symptom controllers, medrol dose pack to address.   FU if not settling 1-2 weeks.

## 2019-01-25 ENCOUNTER — OFFICE (OUTPATIENT)
Dept: URBAN - METROPOLITAN AREA CLINIC 65 | Facility: CLINIC | Age: 59
End: 2019-01-25

## 2019-01-25 VITALS
SYSTOLIC BLOOD PRESSURE: 110 MMHG | WEIGHT: 142 LBS | DIASTOLIC BLOOD PRESSURE: 70 MMHG | HEIGHT: 60 IN | HEART RATE: 84 BPM

## 2019-01-25 DIAGNOSIS — K57.30 DIVERTICULOSIS OF LARGE INTESTINE WITHOUT PERFORATION OR ABS: ICD-10-CM

## 2019-01-25 DIAGNOSIS — Z80.0 FAMILY HISTORY OF MALIGNANT NEOPLASM OF DIGESTIVE ORGANS: ICD-10-CM

## 2019-01-25 DIAGNOSIS — K51.50 LEFT SIDED COLITIS WITHOUT COMPLICATIONS: ICD-10-CM

## 2019-01-25 DIAGNOSIS — K21.9 GASTRO-ESOPHAGEAL REFLUX DISEASE WITHOUT ESOPHAGITIS: ICD-10-CM

## 2019-01-25 PROCEDURE — 99214 OFFICE O/P EST MOD 30 MIN: CPT | Performed by: INTERNAL MEDICINE

## 2019-01-25 RX ORDER — RANITIDINE 300 MG/1
300 TABLET ORAL
Qty: 90 | Refills: 4 | Status: COMPLETED
Start: 2019-01-25 | End: 2021-05-07

## 2019-02-11 RX ORDER — LISINOPRIL 20 MG/1
TABLET ORAL
Qty: 90 TABLET | Refills: 1 | Status: SHIPPED | OUTPATIENT
Start: 2019-02-11 | End: 2019-08-05 | Stop reason: SDUPTHER

## 2019-02-21 ENCOUNTER — OFFICE VISIT (OUTPATIENT)
Dept: FAMILY MEDICINE CLINIC | Facility: CLINIC | Age: 59
End: 2019-02-21

## 2019-02-21 VITALS
HEART RATE: 109 BPM | HEIGHT: 60 IN | WEIGHT: 144 LBS | BODY MASS INDEX: 28.27 KG/M2 | OXYGEN SATURATION: 96 % | SYSTOLIC BLOOD PRESSURE: 124 MMHG | TEMPERATURE: 98.2 F | DIASTOLIC BLOOD PRESSURE: 68 MMHG

## 2019-02-21 DIAGNOSIS — J06.9 ACUTE URI: Primary | ICD-10-CM

## 2019-02-21 PROCEDURE — 99213 OFFICE O/P EST LOW 20 MIN: CPT | Performed by: NURSE PRACTITIONER

## 2019-02-21 RX ORDER — RANITIDINE 300 MG/1
TABLET ORAL
Refills: 4 | COMMUNITY
Start: 2019-01-25 | End: 2020-05-21 | Stop reason: HOSPADM

## 2019-02-21 RX ORDER — BUSPIRONE HYDROCHLORIDE 7.5 MG/1
TABLET ORAL
COMMUNITY
Start: 2019-02-16 | End: 2019-02-21

## 2019-02-21 NOTE — PROGRESS NOTES
"Marek Park is a 58 y.o. female who presents c/o sore throat, cough, congestion x 3 days.     History of Present Illness   2 day history of malaise and swallowing thickness, gradually increased symptoms so took an additional type acid reflux pill and slept well. Woke with additional throat symptoms. No runny nose or head congestion. + head ache, + sore throat, + cough. + ear popping. Stomach bothering her a lot more lately, unsure if because eating out a lot more lately.  not eating her cooking. +UC.   The following portions of the patient's history were reviewed and updated as appropriate: allergies, current medications, past family history, past medical history, past social history, past surgical history and problem list.    Review of Systems   Constitutional: Negative for chills and fever.   HENT: Positive for rhinorrhea and sore throat. Negative for congestion, ear pain and sinus pressure.    Respiratory: Positive for cough.    Cardiovascular: Negative.    Gastrointestinal: Positive for abdominal pain and indigestion.   Neurological: Positive for headache.   Hematological: Negative.    Psychiatric/Behavioral: Negative.      /68   Pulse 109   Temp 98.2 °F (36.8 °C) (Oral)   Ht 152.4 cm (60\")   Wt 65.3 kg (144 lb)   SpO2 96%   BMI 28.12 kg/m²     Objective   Physical Exam   Constitutional: She appears well-developed and well-nourished.   HENT:   Right Ear: Tympanic membrane normal.   Left Ear: Tympanic membrane normal.   Nose: Rhinorrhea present. Right sinus exhibits no maxillary sinus tenderness and no frontal sinus tenderness. Left sinus exhibits no maxillary sinus tenderness and no frontal sinus tenderness.   Mouth/Throat: Uvula is midline and mucous membranes are normal. Posterior oropharyngeal erythema present.   Neck: Normal range of motion. Neck supple. No tracheal deviation present. No thyromegaly present.   Cardiovascular: Normal rate, regular rhythm and normal heart " sounds.   Pulmonary/Chest: Effort normal and breath sounds normal.   Lymphadenopathy:     She has no cervical adenopathy.   Nursing note and vitals reviewed.    Assessment/Plan   Problems Addressed this Visit     None      Visit Diagnoses     Acute URI    -  Primary        URI--likely viral at this point, not severe enough for symptom controllers, but if needs ok. Follow up if worsening symptoms.

## 2019-03-09 DIAGNOSIS — F41.9 ANXIETY: ICD-10-CM

## 2019-03-11 RX ORDER — VENLAFAXINE HYDROCHLORIDE 150 MG/1
TABLET, EXTENDED RELEASE ORAL
Qty: 90 TABLET | Refills: 1 | Status: SHIPPED | OUTPATIENT
Start: 2019-03-11 | End: 2019-09-15 | Stop reason: SDUPTHER

## 2019-03-14 ENCOUNTER — TELEPHONE (OUTPATIENT)
Dept: FAMILY MEDICINE CLINIC | Facility: CLINIC | Age: 59
End: 2019-03-14

## 2019-03-14 DIAGNOSIS — R05.9 COUGH: Primary | ICD-10-CM

## 2019-03-14 RX ORDER — GUAIFENESIN AND CODEINE PHOSPHATE 100; 10 MG/5ML; MG/5ML
5 SOLUTION ORAL 4 TIMES DAILY PRN
Qty: 180 ML | Refills: 0 | Status: SHIPPED | OUTPATIENT
Start: 2019-03-14 | End: 2019-05-20

## 2019-03-25 ENCOUNTER — OFFICE VISIT (OUTPATIENT)
Dept: FAMILY MEDICINE CLINIC | Facility: CLINIC | Age: 59
End: 2019-03-25

## 2019-03-25 VITALS
HEIGHT: 60 IN | HEART RATE: 104 BPM | DIASTOLIC BLOOD PRESSURE: 68 MMHG | OXYGEN SATURATION: 96 % | WEIGHT: 145 LBS | TEMPERATURE: 97.9 F | BODY MASS INDEX: 28.47 KG/M2 | SYSTOLIC BLOOD PRESSURE: 108 MMHG

## 2019-03-25 DIAGNOSIS — J01.00 ACUTE MAXILLARY SINUSITIS, RECURRENCE NOT SPECIFIED: Primary | ICD-10-CM

## 2019-03-25 PROCEDURE — 99214 OFFICE O/P EST MOD 30 MIN: CPT | Performed by: NURSE PRACTITIONER

## 2019-03-25 RX ORDER — AMOXICILLIN AND CLAVULANATE POTASSIUM 875; 125 MG/1; MG/1
1 TABLET, FILM COATED ORAL 2 TIMES DAILY
Qty: 14 TABLET | Refills: 0 | Status: SHIPPED | OUTPATIENT
Start: 2019-03-25 | End: 2019-04-22

## 2019-03-28 RX ORDER — OMEPRAZOLE 40 MG/1
CAPSULE, DELAYED RELEASE ORAL
Qty: 180 CAPSULE | Refills: 1 | Status: SHIPPED | OUTPATIENT
Start: 2019-03-28 | End: 2019-09-15 | Stop reason: SDUPTHER

## 2019-04-22 ENCOUNTER — OFFICE VISIT (OUTPATIENT)
Dept: FAMILY MEDICINE CLINIC | Facility: CLINIC | Age: 59
End: 2019-04-22

## 2019-04-22 VITALS
DIASTOLIC BLOOD PRESSURE: 70 MMHG | BODY MASS INDEX: 26.5 KG/M2 | HEIGHT: 60 IN | OXYGEN SATURATION: 99 % | TEMPERATURE: 98.4 F | RESPIRATION RATE: 16 BRPM | WEIGHT: 135 LBS | SYSTOLIC BLOOD PRESSURE: 102 MMHG | HEART RATE: 97 BPM

## 2019-04-22 DIAGNOSIS — M25.571 PAIN IN JOINTS OF BOTH FEET: ICD-10-CM

## 2019-04-22 DIAGNOSIS — M25.572 PAIN IN JOINTS OF BOTH FEET: ICD-10-CM

## 2019-04-22 DIAGNOSIS — J30.2 SEASONAL ALLERGIC RHINITIS, UNSPECIFIED TRIGGER: Primary | ICD-10-CM

## 2019-04-22 LAB — URATE SERPL-MCNC: 4.4 MG/DL (ref 2.4–5.7)

## 2019-04-22 PROCEDURE — 99213 OFFICE O/P EST LOW 20 MIN: CPT | Performed by: NURSE PRACTITIONER

## 2019-04-22 NOTE — PROGRESS NOTES
Marek Park is a 58 y.o. female who presents with   Chief Complaint   Patient presents with   • Cough     symptoms=3 days.    • Nasal Congestion       3 days ago       Cough   This is a new problem. The current episode started in the past 7 days. The problem has been unchanged. The problem occurs constantly. The cough is non-productive. Associated symptoms include nasal congestion, postnasal drip and a sore throat. Pertinent negatives include no chills, ear congestion, ear pain, fever, headaches, rhinorrhea or shortness of breath. Nothing aggravates the symptoms. Treatments tried: cough medication prescribed  The treatment provided mild relief.        Review of Systems   Constitutional: Negative for chills and fever.   HENT: Positive for congestion, postnasal drip and sore throat. Negative for ear pain and rhinorrhea.    Respiratory: Positive for cough. Negative for shortness of breath.    Neurological: Negative for dizziness and headaches.   All other systems reviewed and are negative.        The following portions of the patient's history were reviewed and updated as appropriate: allergies, current medications, past family history, past medical history, past social history, past surgical history and problem list.      Patient Active Problem List    Diagnosis Date Noted   • Immunocompromised state (CMS/[facility]) 01/21/2018   • Acute post-traumatic stress disorder 02/13/2016   • Anxiety 02/13/2016   • Benign essential hypertension 02/13/2016   • Excessive cerumen in ear canal 02/13/2016   • Chest wall pain 02/13/2016   • Cough 02/13/2016   • Dyslipidemia 02/13/2016   • External hemorrhoids 02/13/2016   • Gastroesophageal reflux disease 02/13/2016   • Hemorrhoids 02/13/2016   • Panic disorder without agoraphobia 02/13/2016   • Peripheral neuropathy 02/13/2016   • Ulcerative colitis (CMS/[facility]) 02/13/2016     Note Last Updated: 5/21/2018     Overview:   on Lialda x 4 years (Senait Demarco MD)     • Ulnar  "neuropathy 02/13/2016       Current Outpatient Medications on File Prior to Visit   Medication Sig Dispense Refill   • adalimumab (HUMIRA) 40 MG/0.8ML Prefilled Syringe Kit injection Inject 40 mg under the skin 1 (One) Time. Once every 2 weeks     • busPIRone (BUSPAR) 10 MG tablet Take 1 tablet by mouth Daily. 30 tablet 5   • guaifenesin-codeine (GUAIFENESIN AC) 100-10 MG/5ML liquid Take 5 mL by mouth 4 (Four) Times a Day As Needed for Cough. 180 mL 0   • ketorolac (ACULAR) 0.5 % ophthalmic solution PLACE 1 DROP IN OS TID FOR 3 DAYS THEN STOP  0   • lisinopril (PRINIVIL,ZESTRIL) 20 MG tablet TAKE 1 TABLET DAILY FOR    BLOOD PRESSURE 90 tablet 1   • Loratadine 10 MG capsule Take 10 mg by mouth Daily.     • omeprazole (priLOSEC) 40 MG capsule TAKE 1 CAPSULE TWICE DAILY 180 capsule 1   • Pediatric Multivitamins-Iron (FLINTSTONES PLUS IRON PO) Take 2 tablets by mouth Daily.     • raNITIdine (ZANTAC) 300 MG tablet TK 1 T PO Q NIGHT  4   • venlafaxine (EFFEXOR) 150 MG tablet sustained-release 24 hour 24 hr tablet TAKE 1 TABLET DAILY 90 tablet 1   • [DISCONTINUED] amoxicillin-clavulanate (AUGMENTIN) 875-125 MG per tablet Take 1 tablet by mouth 2 (Two) Times a Day. 14 tablet 0     No current facility-administered medications on file prior to visit.        Objective     /70 (BP Location: Right arm, Patient Position: Sitting, Cuff Size: Adult)   Pulse 97   Temp 98.4 °F (36.9 °C) (Oral)   Resp 16   Ht 152.4 cm (60\")   Wt 61.2 kg (135 lb)   SpO2 99%   BMI 26.37 kg/m²     Physical Exam   Constitutional: She is oriented to person, place, and time. She appears well-developed and well-nourished.   HENT:   Right Ear: Tympanic membrane normal. Tympanic membrane is not erythematous. No middle ear effusion.   Left Ear: Tympanic membrane normal. Tympanic membrane is not erythematous.  No middle ear effusion.   Nose: No rhinorrhea. Right sinus exhibits no maxillary sinus tenderness and no frontal sinus tenderness. Left sinus " exhibits no maxillary sinus tenderness and no frontal sinus tenderness.   Mouth/Throat: Uvula is midline and oropharynx is clear and moist.   Eyes: Pupils are equal, round, and reactive to light.   Cardiovascular: Normal rate and regular rhythm.   Pulmonary/Chest: Effort normal and breath sounds normal.   Abdominal: Soft.        Neurological: She is alert and oriented to person, place, and time. No sensory deficit.   Psychiatric: She has a normal mood and affect. Her behavior is normal. Judgment and thought content normal.   Vitals reviewed.      Procedures    Assessment/Plan   Marizol was seen today for cough and nasal congestion.    Diagnoses and all orders for this visit:    Seasonal allergic rhinitis, unspecified trigger  -     Discontinue: Chlorcyclizine-Pseudoephed (STAHIST AD) 25-60 MG tablet; Take 25 mg by mouth 2 (Two) Times a Day.  -     Discontinue: Chlorcyclizine-Pseudoephed (STAHIST AD) 25-60 MG tablet; Take 25 mg of amoxicillin by mouth Daily.  -     Chlorcyclizine-Pseudoephed 25-60 MG tablet; Take 25 mg by mouth Daily.    Pain in joints of both feet  -     Uric acid        Discussion  Monitor BP daily on medication normal BP range.       Future Appointments   Date Time Provider Department Center   5/20/2019 10:00 AM Ras Mcconnell DO [facility] None

## 2019-05-20 ENCOUNTER — OFFICE VISIT (OUTPATIENT)
Dept: FAMILY MEDICINE CLINIC | Facility: CLINIC | Age: 59
End: 2019-05-20

## 2019-05-20 VITALS
WEIGHT: 140 LBS | OXYGEN SATURATION: 97 % | SYSTOLIC BLOOD PRESSURE: 138 MMHG | HEART RATE: 92 BPM | BODY MASS INDEX: 27.34 KG/M2 | DIASTOLIC BLOOD PRESSURE: 70 MMHG | TEMPERATURE: 97.8 F

## 2019-05-20 DIAGNOSIS — K51.30 ULCERATIVE RECTOSIGMOIDITIS WITHOUT COMPLICATION (HCC): ICD-10-CM

## 2019-05-20 DIAGNOSIS — E78.5 DYSLIPIDEMIA: ICD-10-CM

## 2019-05-20 DIAGNOSIS — F41.9 ANXIETY: ICD-10-CM

## 2019-05-20 DIAGNOSIS — I10 BENIGN ESSENTIAL HYPERTENSION: Primary | ICD-10-CM

## 2019-05-20 DIAGNOSIS — R53.83 OTHER FATIGUE: ICD-10-CM

## 2019-05-20 PROCEDURE — 99214 OFFICE O/P EST MOD 30 MIN: CPT | Performed by: FAMILY MEDICINE

## 2019-05-20 NOTE — PROGRESS NOTES
Subjective   Marizol Park is a 58 y.o. female. Presents today for   Chief Complaint   Patient presents with   • Follow-up     going to [place] and wants to take advil.  when she takes effexor at night causing night sweats.   • Hypertension   • Hyperlipidemia   • Anxiety     Patient going [place] with Sister and excited.  Hypertension   This is a chronic problem. The current episode started more than 1 year ago. The problem is unchanged. The problem is controlled. Associated symptoms include anxiety. Pertinent negatives include no chest pain, orthopnea, palpitations, peripheral edema, PND or shortness of breath. There are no associated agents to hypertension. Risk factors for coronary artery disease include dyslipidemia and post-menopausal state. Past treatments include ACE inhibitors. Current antihypertension treatment includes ACE inhibitors. The current treatment provides moderate improvement. There is no history of kidney disease, CAD/MI, CVA, heart failure, PVD or retinopathy.   Hyperlipidemia   This is a chronic problem. The current episode started more than 1 year ago. The problem is uncontrolled. Recent lipid tests were reviewed and are high. Pertinent negatives include no chest pain or shortness of breath. Current antihyperlipidemic treatment includes diet change. The current treatment provides mild improvement of lipids. There are no compliance problems.  Risk factors for coronary artery disease include dyslipidemia, hypertension and post-menopausal.   Anxiety   Presents for follow-up visit. Patient reports no chest pain, excessive worry, nervous/anxious behavior, palpitations or shortness of breath. Symptoms occur rarely. The quality of sleep is good. Nighttime awakenings: occasional.     Compliance with medications is %.   UC  Doing well;  On humira;  Tolerating and no infections;  No diarrhea;  No bloody stools.  Drinks ensure daily.  Feels fatigued.    Patient rare dysphagia, had dilatation in  past;  Declines referral until after trip as doing ok for now.     Review of Systems   Respiratory: Negative for shortness of breath.    Cardiovascular: Negative for chest pain, palpitations, orthopnea and PND.   Psychiatric/Behavioral: The patient is not nervous/anxious.        Patient Active Problem List   Diagnosis   • Acute post-traumatic stress disorder   • Anxiety   • Benign essential hypertension   • Excessive cerumen in ear canal   • Cough   • Dyslipidemia   • External hemorrhoids   • Gastroesophageal reflux disease   • Hemorrhoids   • Panic disorder without agoraphobia   • Peripheral neuropathy   • Ulcerative colitis (CMS/HCC)   • Ulnar neuropathy   • Immunocompromised state (CMS/HCC)       Social History     Socioeconomic History   • Marital status:      Spouse name: Not on file   • Number of children: Not on file   • Years of education: Not on file   • Highest education level: Not on file   Tobacco Use   • Smoking status: Never Smoker   • Smokeless tobacco: Never Used   Substance and Sexual Activity   • Alcohol use: No   • Drug use: No   • Sexual activity: No       Allergies   Allergen Reactions   • Remicade [Infliximab] Hives and Swelling     Facial swelling, hives wide spread   • Sulfa Antibiotics Hives       Current Outpatient Medications on File Prior to Visit   Medication Sig Dispense Refill   • adalimumab (HUMIRA) 40 MG/0.8ML Prefilled Syringe Kit injection Inject 40 mg under the skin 1 (One) Time. Once every 2 weeks     • busPIRone (BUSPAR) 10 MG tablet Take 1 tablet by mouth Daily. 30 tablet 5   • Chlorcyclizine-Pseudoephed 25-60 MG tablet Take 25 mg by mouth Daily. 15 tablet 0   • ketorolac (ACULAR) 0.5 % ophthalmic solution PLACE 1 DROP IN OS TID FOR 3 DAYS THEN STOP  0   • lisinopril (PRINIVIL,ZESTRIL) 20 MG tablet TAKE 1 TABLET DAILY FOR    BLOOD PRESSURE 90 tablet 1   • omeprazole (priLOSEC) 40 MG capsule TAKE 1 CAPSULE TWICE DAILY 180 capsule 1   • Pediatric Multivitamins-Iron  (FLINTSTONES PLUS IRON PO) Take 2 tablets by mouth Daily.     • raNITIdine (ZANTAC) 300 MG tablet TK 1 T PO Q NIGHT  4   • venlafaxine (EFFEXOR) 150 MG tablet sustained-release 24 hour 24 hr tablet TAKE 1 TABLET DAILY 90 tablet 1   • [DISCONTINUED] guaifenesin-codeine (GUAIFENESIN AC) 100-10 MG/5ML liquid Take 5 mL by mouth 4 (Four) Times a Day As Needed for Cough. 180 mL 0   • [DISCONTINUED] Loratadine 10 MG capsule Take 10 mg by mouth Daily.       No current facility-administered medications on file prior to visit.        Objective   Vitals:    05/20/19 1005   BP: 138/70   Pulse: 92   Temp: 97.8 °F (36.6 °C)   SpO2: 97%   Weight: 63.5 kg (140 lb)       Physical Exam   Constitutional: She appears well-developed and well-nourished.   HENT:   Head: Normocephalic and atraumatic.   Neck: Neck supple. No JVD present. No thyromegaly present.   Cardiovascular: Normal rate, regular rhythm and normal heart sounds. Exam reveals no gallop and no friction rub.   No murmur heard.  Pulmonary/Chest: Effort normal and breath sounds normal. No respiratory distress. She has no wheezes. She has no rales.   Abdominal: Soft. Bowel sounds are normal. She exhibits no distension. There is no tenderness. There is no rebound and no guarding.   Musculoskeletal: She exhibits no edema.   Neurological: She is alert.   Skin: Skin is warm and dry.   Psychiatric: She has a normal mood and affect. Her behavior is normal.   Nursing note and vitals reviewed.      Assessment/Plan   Marizol was seen today for follow-up, hypertension, hyperlipidemia and anxiety.    Diagnoses and all orders for this visit:    Benign essential hypertension  -     CBC & Differential  -     Comprehensive Metabolic Panel  -     TSH  -     Vitamin B12    Dyslipidemia  -     CBC & Differential  -     Comprehensive Metabolic Panel  -     TSH  -     Vitamin B12    Ulcerative rectosigmoiditis without complication (CMS/HCC)  -     CBC & Differential  -     Comprehensive Metabolic  Panel  -     TSH  -     Vitamin B12    Anxiety  -     CBC & Differential  -     Comprehensive Metabolic Panel  -     TSH  -     Vitamin B12    Other fatigue  -     CBC & Differential  -     Comprehensive Metabolic Panel  -     TSH  -     Vitamin B12    -anxiety stable;  Continue meds  -UC stable;  Reports mild faitue, will check labs  -hypertension - controlled, continue medications  -hld - recheck lipids  -if dysphagia worsens go to GI.         -Follow up: 6 months and prn

## 2019-05-21 LAB
ALBUMIN SERPL-MCNC: 4 G/DL (ref 3.5–5.2)
ALBUMIN/GLOB SERPL: 1.3 G/DL
ALP SERPL-CCNC: 107 U/L (ref 39–117)
ALT SERPL-CCNC: 39 U/L (ref 1–33)
AST SERPL-CCNC: 29 U/L (ref 1–32)
BASOPHILS # BLD AUTO: 0.02 10*3/MM3 (ref 0–0.2)
BASOPHILS NFR BLD AUTO: 0.3 % (ref 0–1.5)
BILIRUB SERPL-MCNC: 0.3 MG/DL (ref 0.2–1.2)
BUN SERPL-MCNC: 20 MG/DL (ref 6–20)
BUN/CREAT SERPL: 27.8 (ref 7–25)
CALCIUM SERPL-MCNC: 10 MG/DL (ref 8.6–10.5)
CHLORIDE SERPL-SCNC: 101 MMOL/L (ref 98–107)
CO2 SERPL-SCNC: 28.7 MMOL/L (ref 22–29)
CREAT SERPL-MCNC: 0.72 MG/DL (ref 0.57–1)
EOSINOPHIL # BLD AUTO: 0.09 10*3/MM3 (ref 0–0.4)
EOSINOPHIL NFR BLD AUTO: 1.5 % (ref 0.3–6.2)
ERYTHROCYTE [DISTWIDTH] IN BLOOD BY AUTOMATED COUNT: 12.6 % (ref 12.3–15.4)
GLOBULIN SER CALC-MCNC: 3.2 GM/DL
GLUCOSE SERPL-MCNC: 76 MG/DL (ref 65–99)
HCT VFR BLD AUTO: 44.7 % (ref 34–46.6)
HGB BLD-MCNC: 13.6 G/DL (ref 12–15.9)
IMM GRANULOCYTES # BLD AUTO: 0.01 10*3/MM3 (ref 0–0.05)
IMM GRANULOCYTES NFR BLD AUTO: 0.2 % (ref 0–0.5)
LYMPHOCYTES # BLD AUTO: 2.56 10*3/MM3 (ref 0.7–3.1)
LYMPHOCYTES NFR BLD AUTO: 43.2 % (ref 19.6–45.3)
MCH RBC QN AUTO: 27.9 PG (ref 26.6–33)
MCHC RBC AUTO-ENTMCNC: 30.4 G/DL (ref 31.5–35.7)
MCV RBC AUTO: 91.6 FL (ref 79–97)
MONOCYTES # BLD AUTO: 0.61 10*3/MM3 (ref 0.1–0.9)
MONOCYTES NFR BLD AUTO: 10.3 % (ref 5–12)
NEUTROPHILS # BLD AUTO: 2.63 10*3/MM3 (ref 1.7–7)
NEUTROPHILS NFR BLD AUTO: 44.5 % (ref 42.7–76)
NRBC BLD AUTO-RTO: 0 /100 WBC (ref 0–0.2)
PLATELET # BLD AUTO: 325 10*3/MM3 (ref 140–450)
POTASSIUM SERPL-SCNC: 4.3 MMOL/L (ref 3.5–5.2)
PROT SERPL-MCNC: 7.2 G/DL (ref 6–8.5)
RBC # BLD AUTO: 4.88 10*6/MM3 (ref 3.77–5.28)
SODIUM SERPL-SCNC: 141 MMOL/L (ref 136–145)
TSH SERPL DL<=0.005 MIU/L-ACNC: 2.24 MIU/ML (ref 0.27–4.2)
VIT B12 SERPL-MCNC: 751 PG/ML (ref 211–946)
WBC # BLD AUTO: 5.92 10*3/MM3 (ref 3.4–10.8)

## 2019-06-20 ENCOUNTER — TELEPHONE (OUTPATIENT)
Dept: FAMILY MEDICINE CLINIC | Facility: CLINIC | Age: 59
End: 2019-06-20

## 2019-06-20 RX ORDER — TRAZODONE HYDROCHLORIDE 50 MG/1
50 TABLET ORAL NIGHTLY
Qty: 30 TABLET | Refills: 1 | Status: SHIPPED | OUTPATIENT
Start: 2019-06-20 | End: 2021-05-24

## 2019-07-25 ENCOUNTER — OFFICE VISIT (OUTPATIENT)
Dept: FAMILY MEDICINE CLINIC | Facility: CLINIC | Age: 59
End: 2019-07-25

## 2019-07-25 VITALS
TEMPERATURE: 98.7 F | SYSTOLIC BLOOD PRESSURE: 124 MMHG | BODY MASS INDEX: 27.09 KG/M2 | HEART RATE: 98 BPM | OXYGEN SATURATION: 98 % | WEIGHT: 138 LBS | HEIGHT: 60 IN | DIASTOLIC BLOOD PRESSURE: 78 MMHG

## 2019-07-25 DIAGNOSIS — M79.671 PAIN OF RIGHT HEEL: Primary | ICD-10-CM

## 2019-07-25 PROCEDURE — 99213 OFFICE O/P EST LOW 20 MIN: CPT | Performed by: NURSE PRACTITIONER

## 2019-07-25 PROCEDURE — 73650 X-RAY EXAM OF HEEL: CPT | Performed by: NURSE PRACTITIONER

## 2019-07-25 NOTE — PROGRESS NOTES
"Marek Park is a 58 y.o. female who presents c/o pain on bottom of right foot. Thinks may have stepped on something last night.     History of Present Illness   Was barefoot and felt like stepped on a bee. Very painful. Still painful when steps on.   The following portions of the patient's history were reviewed and updated as appropriate: allergies, current medications, past family history, past medical history, past social history, past surgical history and problem list.    Review of Systems   Constitutional: Positive for diaphoresis. Negative for activity change, appetite change and unexpected weight gain.   Respiratory: Negative for shortness of breath.    Cardiovascular: Negative.    Musculoskeletal: Positive for arthralgias.   Skin: Positive for color change, skin lesions and bruise.   Psychiatric/Behavioral: Negative.      /78   Pulse 98   Temp 98.7 °F (37.1 °C) (Oral)   Ht 152.4 cm (60\")   Wt 62.6 kg (138 lb)   SpO2 98%   BMI 26.95 kg/m²     Objective   Physical Exam   Constitutional: She appears well-developed and well-nourished. No distress.   Cardiovascular: Normal rate.   Pulmonary/Chest: Effort normal.   Musculoskeletal:        Right foot: There is tenderness and bony tenderness.        Skin: Skin is warm and dry. Bruising noted.   4 mm irregular border purple tender bruise to mid heel, careful inspection under magnification reveals no skin breaks.    Psychiatric: She has a normal mood and affect. Her behavior is normal. Judgment and thought content normal.   Nursing note and vitals reviewed.    Assessment/Plan   Problems Addressed this Visit     None      Visit Diagnoses     Pain of right heel    -  Primary    Relevant Orders    XR Calcaneus 2+ View Right        Heel pain--xray with tiny heel spur, no radiopaque foreign body, no comparison, will send to radiology for interpretation. Recommend wear cushioned shoes for comfort. FU prn lack of settling.         "

## 2019-08-05 RX ORDER — LISINOPRIL 20 MG/1
TABLET ORAL
Qty: 90 TABLET | Refills: 1 | Status: SHIPPED | OUTPATIENT
Start: 2019-08-05 | End: 2020-01-27

## 2019-09-15 DIAGNOSIS — F41.9 ANXIETY: ICD-10-CM

## 2019-09-16 RX ORDER — OMEPRAZOLE 40 MG/1
CAPSULE, DELAYED RELEASE ORAL
Qty: 180 CAPSULE | Refills: 1 | Status: SHIPPED | OUTPATIENT
Start: 2019-09-16 | End: 2020-03-11

## 2019-09-16 RX ORDER — VENLAFAXINE HYDROCHLORIDE 150 MG/1
TABLET, EXTENDED RELEASE ORAL
Qty: 90 TABLET | Refills: 1 | Status: SHIPPED | OUTPATIENT
Start: 2019-09-16 | End: 2020-03-03

## 2019-10-07 ENCOUNTER — FLU SHOT (OUTPATIENT)
Dept: FAMILY MEDICINE CLINIC | Facility: CLINIC | Age: 59
End: 2019-10-07

## 2019-10-07 DIAGNOSIS — Z23 FLU VACCINE NEED: ICD-10-CM

## 2019-10-07 PROCEDURE — G0008 ADMIN INFLUENZA VIRUS VAC: HCPCS | Performed by: NURSE PRACTITIONER

## 2019-10-07 PROCEDURE — 90674 CCIIV4 VAC NO PRSV 0.5 ML IM: CPT | Performed by: NURSE PRACTITIONER

## 2019-11-08 ENCOUNTER — OFFICE (OUTPATIENT)
Dept: URBAN - METROPOLITAN AREA CLINIC 65 | Facility: CLINIC | Age: 59
End: 2019-11-08

## 2019-11-08 VITALS
HEIGHT: 60 IN | WEIGHT: 137 LBS | SYSTOLIC BLOOD PRESSURE: 110 MMHG | DIASTOLIC BLOOD PRESSURE: 72 MMHG | HEART RATE: 95 BPM

## 2019-11-08 DIAGNOSIS — I10 ESSENTIAL (PRIMARY) HYPERTENSION: ICD-10-CM

## 2019-11-08 DIAGNOSIS — K51.50 LEFT SIDED COLITIS WITHOUT COMPLICATIONS: ICD-10-CM

## 2019-11-08 DIAGNOSIS — K57.30 DIVERTICULOSIS OF LARGE INTESTINE WITHOUT PERFORATION OR ABS: ICD-10-CM

## 2019-11-08 DIAGNOSIS — Z80.0 FAMILY HISTORY OF MALIGNANT NEOPLASM OF DIGESTIVE ORGANS: ICD-10-CM

## 2019-11-08 PROCEDURE — 99214 OFFICE O/P EST MOD 30 MIN: CPT | Performed by: INTERNAL MEDICINE

## 2019-11-12 ENCOUNTER — OFFICE VISIT (OUTPATIENT)
Dept: FAMILY MEDICINE CLINIC | Facility: CLINIC | Age: 59
End: 2019-11-12

## 2019-11-12 VITALS
SYSTOLIC BLOOD PRESSURE: 118 MMHG | OXYGEN SATURATION: 99 % | HEART RATE: 102 BPM | WEIGHT: 140 LBS | TEMPERATURE: 98.7 F | BODY MASS INDEX: 27.48 KG/M2 | HEIGHT: 60 IN | DIASTOLIC BLOOD PRESSURE: 72 MMHG

## 2019-11-12 DIAGNOSIS — F41.9 ANXIETY: ICD-10-CM

## 2019-11-12 DIAGNOSIS — J06.9 ACUTE URI: Primary | ICD-10-CM

## 2019-11-12 DIAGNOSIS — K21.9 GASTROESOPHAGEAL REFLUX DISEASE, ESOPHAGITIS PRESENCE NOT SPECIFIED: ICD-10-CM

## 2019-11-12 DIAGNOSIS — F43.11 ACUTE POST-TRAUMATIC STRESS DISORDER: ICD-10-CM

## 2019-11-12 PROCEDURE — 99213 OFFICE O/P EST LOW 20 MIN: CPT | Performed by: NURSE PRACTITIONER

## 2019-11-12 NOTE — PROGRESS NOTES
"Marek Park is a 59 y.o. female who presents c/o SOA, sore throat, cough since last night.     History of Present Illness   Having head congestion and SOA with ST x 1 night, having stomach pain, started a few minutes ago, though taking omeprazole long term.   Having a lot of stress, raising grandson, has counseling appt with caitlin.  The following portions of the patient's history were reviewed and updated as appropriate: allergies, current medications, past family history, past medical history, past social history, past surgical history and problem list.    Review of Systems   Constitutional: Negative for fatigue and fever.   HENT: Positive for congestion, postnasal drip and rhinorrhea.    Eyes: Negative.    Respiratory: Positive for cough and shortness of breath.    Cardiovascular: Negative.    Gastrointestinal: Positive for abdominal pain.   Endocrine: Negative.    Musculoskeletal: Negative for arthralgias.   Skin: Negative.    Allergic/Immunologic: Positive for immunocompromised state.   Hematological: Negative for adenopathy.   Psychiatric/Behavioral: Positive for depressed mood and stress.     /72   Pulse 102   Temp 98.7 °F (37.1 °C) (Oral)   Ht 152.4 cm (60\")   Wt 63.5 kg (140 lb)   SpO2 99%   BMI 27.34 kg/m²     Objective   Physical Exam   Constitutional: She is oriented to person, place, and time. She appears well-developed and well-nourished. No distress.   HENT:   Right Ear: Tympanic membrane normal.   Left Ear: Tympanic membrane normal.   Nose: Mucosal edema present. Right sinus exhibits no maxillary sinus tenderness and no frontal sinus tenderness. Left sinus exhibits no maxillary sinus tenderness and no frontal sinus tenderness.   Mouth/Throat: Uvula is midline and mucous membranes are normal. Posterior oropharyngeal erythema present.   Neck: Normal range of motion. Neck supple. No tracheal deviation present. No thyromegaly present.   Cardiovascular: Normal rate, regular " rhythm and normal heart sounds.   Pulmonary/Chest: Effort normal and breath sounds normal.   Abdominal: Soft. Bowel sounds are normal. She exhibits no distension. There is no tenderness.   Lymphadenopathy:     She has no cervical adenopathy.   Neurological: She is alert and oriented to person, place, and time.   Skin: Skin is warm and dry. Capillary refill takes less than 2 seconds.   Psychiatric: Her speech is normal and behavior is normal. Her mood appears anxious. She exhibits a depressed mood.   Nursing note and vitals reviewed.    Assessment/Plan   Problems Addressed this Visit        Digestive    Gastroesophageal reflux disease       Other    Acute post-traumatic stress disorder    Anxiety      Other Visit Diagnoses     Acute URI    -  Primary      GERD--likely flared by stress  Anxiety and PTSD--recommend counseling to address, continue medications  URI--start antihistamines, FU 3-4 days if not improving.

## 2019-11-19 ENCOUNTER — OFFICE VISIT (OUTPATIENT)
Dept: FAMILY MEDICINE CLINIC | Facility: CLINIC | Age: 59
End: 2019-11-19

## 2019-11-19 VITALS
DIASTOLIC BLOOD PRESSURE: 76 MMHG | HEART RATE: 100 BPM | SYSTOLIC BLOOD PRESSURE: 120 MMHG | BODY MASS INDEX: 26.95 KG/M2 | WEIGHT: 138 LBS | OXYGEN SATURATION: 97 % | TEMPERATURE: 97.7 F

## 2019-11-19 DIAGNOSIS — F41.9 ANXIETY: ICD-10-CM

## 2019-11-19 DIAGNOSIS — L57.8 SUN-DAMAGED SKIN: ICD-10-CM

## 2019-11-19 DIAGNOSIS — Z00.00 MEDICARE ANNUAL WELLNESS VISIT, SUBSEQUENT: Primary | ICD-10-CM

## 2019-11-19 DIAGNOSIS — D84.9 IMMUNOCOMPROMISED STATE (HCC): ICD-10-CM

## 2019-11-19 DIAGNOSIS — K51.30 ULCERATIVE RECTOSIGMOIDITIS WITHOUT COMPLICATION (HCC): ICD-10-CM

## 2019-11-19 DIAGNOSIS — E78.1 HIGH TRIGLYCERIDES: ICD-10-CM

## 2019-11-19 DIAGNOSIS — I10 BENIGN ESSENTIAL HYPERTENSION: ICD-10-CM

## 2019-11-19 PROCEDURE — G0439 PPPS, SUBSEQ VISIT: HCPCS | Performed by: FAMILY MEDICINE

## 2019-11-19 PROCEDURE — 99214 OFFICE O/P EST MOD 30 MIN: CPT | Performed by: FAMILY MEDICINE

## 2019-11-19 RX ORDER — BUSPIRONE HYDROCHLORIDE 15 MG/1
15 TABLET ORAL DAILY
Start: 2019-11-19 | End: 2021-07-19 | Stop reason: SDUPTHER

## 2019-11-19 NOTE — PATIENT INSTRUCTIONS
Advance Directive    Advance directives are legal documents that let you make choices ahead of time about your health care and medical treatment in case you become unable to communicate for yourself. Advance directives are a way for you to communicate your wishes to family, friends, and health care providers. This can help convey your decisions about end-of-life care if you become unable to communicate.  Discussing and writing advance directives should happen over time rather than all at once. Advance directives can be changed depending on your situation and what you want, even after you have signed the advance directives.  If you do not have an advance directive, some states assign family decision makers to act on your behalf based on how closely you are related to them. Each state has its own laws regarding advance directives. You may want to check with your health care provider, , or state representative about the laws in your state. There are different types of advance directives, such as:  · Medical power of .  · Living will.  · Do not resuscitate (DNR) or do not attempt resuscitation (DNAR) order.  Health care proxy and medical power of   A health care proxy, also called a health care agent, is a person who is appointed to make medical decisions for you in cases in which you are unable to make the decisions yourself. Generally, people choose someone they know well and trust to represent their preferences. Make sure to ask this person for an agreement to act as your proxy. A proxy may have to exercise judgment in the event of a medical decision for which your wishes are not known.  A medical power of  is a legal document that names your health care proxy. Depending on the laws in your state, after the document is written, it may also need to be:  · Signed.  · Notarized.  · Dated.  · Copied.  · Witnessed.  · Incorporated into your medical record.  You may also want to appoint  someone to manage your financial affairs in a situation in which you are unable to do so. This is called a durable power of  for finances. It is a separate legal document from the durable power of  for health care. You may choose the same person or someone different from your health care proxy to act as your agent in financial matters.  If you do not appoint a proxy, or if there is a concern that the proxy is not acting in your best interests, a court-appointed guardian may be designated to act on your behalf.  Living will  A living will is a set of instructions documenting your wishes about medical care when you cannot express them yourself. Health care providers should keep a copy of your living will in your medical record. You may want to give a copy to family members or friends. To alert caregivers in case of an emergency, you can place a card in your wallet to let them know that you have a living will and where they can find it. A living will is used if you become:  · Terminally ill.  · Incapacitated.  · Unable to communicate or make decisions.  Items to consider in your living will include:  · The use or non-use of life-sustaining equipment, such as dialysis machines and breathing machines (ventilators).  · A DNR or DNAR order, which is the instruction not to use cardiopulmonary resuscitation (CPR) if breathing or heartbeat stops.  · The use or non-use of tube feeding.  · Withholding of food and fluids.  · Comfort (palliative) care when the goal becomes comfort rather than a cure.  · Organ and tissue donation.  A living will does not give instructions for distributing your money and property if you should pass away. It is recommended that you seek the advice of a  when writing a will. Decisions about taxes, beneficiaries, and asset distribution will be legally binding. This process can relieve your family and friends of any concerns surrounding disputes or questions that may come up about  the distribution of your assets.  DNR or DNAR  A DNR or DNAR order is a request not to have CPR in the event that your heart stops beating or you stop breathing. If a DNR or DNAR order has not been made and shared, a health care provider will try to help any patient whose heart has stopped or who has stopped breathing. If you plan to have surgery, talk with your health care provider about how your DNR or DNAR order will be followed if problems occur.  Summary  · Advance directives are the legal documents that allow you to make choices ahead of time about your health care and medical treatment in case you become unable to communicate for yourself.  · The process of discussing and writing advance directives should happen over time. You can change the advance directives, even after you have signed them.  · Advance directives include DNR or DNAR orders, living esqueda, and designating an agent as your medical power of .  This information is not intended to replace advice given to you by your health care provider. Make sure you discuss any questions you have with your health care provider.  Document Released: 03/26/2009 Document Revised: 11/06/2017 Document Reviewed: 11/06/2017  [email or web address] Interactive Patient Education © 2019 [email or web address]      Calorie Counting for Weight Loss  Calories are units of energy. Your body needs a certain amount of calories from food to keep you going throughout the day. When you eat more calories than your body needs, your body stores the extra calories as fat. When you eat fewer calories than your body needs, your body burns fat to get the energy it needs.  Calorie counting means keeping track of how many calories you eat and drink each day. Calorie counting can be helpful if you need to lose weight. If you make sure to eat fewer calories than your body needs, you should lose weight. Ask your health care provider what a healthy weight is for you.  For calorie counting to work, you will  need to eat the right number of calories in a day in order to lose a healthy amount of weight per week. A dietitian can help you determine how many calories you need in a day and will give you suggestions on how to reach your calorie goal.  · A healthy amount of weight to lose per week is usually 1-2 lb (0.5-0.9 kg). This usually means that your daily calorie intake should be reduced by 500-750 calories.  · Eating 1,200 - 1,500 calories per day can help most women lose weight.  · Eating 1,500 - 1,800 calories per day can help most men lose weight.  What is my plan?  My goal is to have __________ calories per day.  If I have this many calories per day, I should lose around __________ pounds per week.  What do I need to know about calorie counting?  In order to meet your daily calorie goal, you will need to:  · Find out how many calories are in each food you would like to eat. Try to do this before you eat.  · Decide how much of the food you plan to eat.  · Write down what you ate and how many calories it had. Doing this is called keeping a food log.  To successfully lose weight, it is important to balance calorie counting with a healthy lifestyle that includes regular activity. Aim for 150 minutes of moderate exercise (such as walking) or 75 minutes of vigorous exercise (such as running) each week.  Where do I find calorie information?    The number of calories in a food can be found on a Nutrition Facts label. If a food does not have a Nutrition Facts label, try to look up the calories online or ask your dietitian for help.  Remember that calories are listed per serving. If you choose to have more than one serving of a food, you will have to multiply the calories per serving by the amount of servings you plan to eat. For example, the label on a package of bread might say that a serving size is 1 slice and that there are 90 calories in a serving. If you eat 1 slice, you will have eaten 90 calories. If you eat 2  "slices, you will have eaten 180 calories.  How do I keep a food log?  Immediately after each meal, record the following information in your food log:  · What you ate. Don't forget to include toppings, sauces, and other extras on the food.  · How much you ate. This can be measured in cups, ounces, or number of items.  · How many calories each food and drink had.  · The total number of calories in the meal.  Keep your food log near you, such as in a small notebook in your pocket, or use a mobile audelia or website. Some programs will calculate calories for you and show you how many calories you have left for the day to meet your goal.  What are some calorie counting tips?    · Use your calories on foods and drinks that will fill you up and not leave you hungry:  ? Some examples of foods that fill you up are nuts and nut butters, vegetables, lean proteins, and high-fiber foods like whole grains. High-fiber foods are foods with more than 5 g fiber per serving.  ? Drinks such as sodas, specialty coffee drinks, alcohol, and juices have a lot of calories, yet do not fill you up.  · Eat nutritious foods and avoid empty calories. Empty calories are calories you get from foods or beverages that do not have many vitamins or protein, such as candy, sweets, and soda. It is better to have a nutritious high-calorie food (such as an avocado) than a food with few nutrients (such as a bag of chips).  · Know how many calories are in the foods you eat most often. This will help you calculate calorie counts faster.  · Pay attention to calories in drinks. Low-calorie drinks include water and unsweetened drinks.  · Pay attention to nutrition labels for \"low fat\" or \"fat free\" foods. These foods sometimes have the same amount of calories or more calories than the full fat versions. They also often have added sugar, starch, or salt, to make up for flavor that was removed with the fat.  · Find a way of tracking calories that works for you. Get " creative. Try different apps or programs if writing down calories does not work for you.  What are some portion control tips?  · Know how many calories are in a serving. This will help you know how many servings of a certain food you can have.  · Use a measuring cup to measure serving sizes. You could also try weighing out portions on a kitchen scale. With time, you will be able to estimate serving sizes for some foods.  · Take some time to put servings of different foods on your favorite plates, bowls, and cups so you know what a serving looks like.  · Try not to eat straight from a bag or box. Doing this can lead to overeating. Put the amount you would like to eat in a cup or on a plate to make sure you are eating the right portion.  · Use smaller plates, glasses, and bowls to prevent overeating.  · Try not to multitask (for example, watch TV or use your computer) while eating. If it is time to eat, sit down at a table and enjoy your food. This will help you to know when you are full. It will also help you to be aware of what you are eating and how much you are eating.  What are tips for following this plan?  Reading food labels  · Check the calorie count compared to the serving size. The serving size may be smaller than what you are used to eating.  · Check the source of the calories. Make sure the food you are eating is high in vitamins and protein and low in saturated and trans fats.  Shopping  · Read nutrition labels while you shop. This will help you make healthy decisions before you decide to purchase your food.  · Make a grocery list and stick to it.  Cooking  · Try to cook your favorite foods in a healthier way. For example, try baking instead of frying.  · Use low-fat dairy products.  Meal planning  · Use more fruits and vegetables. Half of your plate should be fruits and vegetables.  · Include lean proteins like poultry and fish.  How do I count calories when eating out?  · Ask for smaller portion  "sizes.  · Consider sharing an entree and sides instead of getting your own entree.  · If you get your own entree, eat only half. Ask for a box at the beginning of your meal and put the rest of your entree in it so you are not tempted to eat it.  · If calories are listed on the menu, choose the lower calorie options.  · Choose dishes that include vegetables, fruits, whole grains, low-fat dairy products, and lean protein.  · Choose items that are boiled, broiled, grilled, or steamed. Stay away from items that are buttered, battered, fried, or served with cream sauce. Items labeled \"crispy\" are usually fried, unless stated otherwise.  · Choose water, low-fat milk, unsweetened iced tea, or other drinks without added sugar. If you want an alcoholic beverage, choose a lower calorie option such as a glass of wine or light beer.  · Ask for dressings, sauces, and syrups on the side. These are usually high in calories, so you should limit the amount you eat.  · If you want a salad, choose a garden salad and ask for grilled meats. Avoid extra toppings like zacarias, cheese, or fried items. Ask for the dressing on the side, or ask for olive oil and vinegar or lemon to use as dressing.  · Estimate how many servings of a food you are given. For example, a serving of cooked rice is ½ cup or about the size of half a baseball. Knowing serving sizes will help you be aware of how much food you are eating at restaurants. The list below tells you how big or small some common portion sizes are based on everyday objects:  ? 1 oz--4 stacked dice.  ? 3 oz--1 deck of cards.  ? 1 tsp--1 die.  ? 1 Tbsp--½ a ping-pong ball.  ? 2 Tbsp--1 ping-pong ball.  ? ½ cup--½ baseball.  ? 1 cup--1 baseball.  Summary  · Calorie counting means keeping track of how many calories you eat and drink each day. If you eat fewer calories than your body needs, you should lose weight.  · A healthy amount of weight to lose per week is usually 1-2 lb (0.5-0.9 kg). This " usually means reducing your daily calorie intake by 500-750 calories.  · The number of calories in a food can be found on a Nutrition Facts label. If a food does not have a Nutrition Facts label, try to look up the calories online or ask your dietitian for help.  · Use your calories on foods and drinks that will fill you up, and not on foods and drinks that will leave you hungry.  · Use smaller plates, glasses, and bowls to prevent overeating.  This information is not intended to replace advice given to you by your health care provider. Make sure you discuss any questions you have with your health care provider.  Document Released: 12/18/2006 Document Revised: 09/06/2019 Document Reviewed: 11/17/2017  [employer] Interactive Patient Education © 2019 [employer]      Exercising to Lose Weight  Exercise is structured, repetitive physical activity to improve fitness and health. Getting regular exercise is important for everyone. It is especially important if you are overweight. Being overweight increases your risk of heart disease, stroke, diabetes, high blood pressure, and several types of cancer. Reducing your calorie intake and exercising can help you lose weight.  Exercise is usually categorized as moderate or vigorous intensity. To lose weight, most people need to do a certain amount of moderate-intensity or vigorous-intensity exercise each week.  Moderate-intensity exercise    Moderate-intensity exercise is any activity that gets you moving enough to burn at least three times more energy (calories) than if you were sitting.  Examples of moderate exercise include:  · Walking a mile in 15 minutes.  · Doing light yard work.  · Biking at an easy pace.  Most people should get at least 150 minutes (2 hours and 30 minutes) a week of moderate-intensity exercise to maintain their body weight.  Vigorous-intensity exercise  Vigorous-intensity exercise is any activity that gets you moving enough to burn at least six times  more calories than if you were sitting. When you exercise at this intensity, you should be working hard enough that you are not able to carry on a conversation.  Examples of vigorous exercise include:  · Running.  · Playing a team sport, such as football, basketball, and soccer.  · Jumping rope.  Most people should get at least 75 minutes (1 hour and 15 minutes) a week of vigorous-intensity exercise to maintain their body weight.  How can exercise affect me?  When you exercise enough to burn more calories than you eat, you lose weight. Exercise also reduces body fat and builds muscle. The more muscle you have, the more calories you burn. Exercise also:  · Improves mood.  · Reduces stress and tension.  · Improves your overall fitness, flexibility, and endurance.  · Increases bone strength.  The amount of exercise you need to lose weight depends on:  · Your age.  · The type of exercise.  · Any health conditions you have.  · Your overall physical ability.  Talk to your health care provider about how much exercise you need and what types of activities are safe for you.  What actions can I take to lose weight?  Nutrition    · Make changes to your diet as told by your health care provider or diet and nutrition specialist (dietitian). This may include:  ? Eating fewer calories.  ? Eating more protein.  ? Eating less unhealthy fats.  ? Eating a diet that includes fresh fruits and vegetables, whole grains, low-fat dairy products, and lean protein.  ? Avoiding foods with added fat, salt, and sugar.  · Drink plenty of water while you exercise to prevent dehydration or heat stroke.  Activity  · Choose an activity that you enjoy and set realistic goals. Your health care provider can help you make an exercise plan that works for you.  · Exercise at a moderate or vigorous intensity most days of the week.  ? The intensity of exercise may vary from person to person. You can tell how intense a workout is for you by paying attention  to your breathing and heartbeat. Most people will notice their breathing and heartbeat get faster with more intense exercise.  · Do resistance training twice each week, such as:  ? Push-ups.  ? Sit-ups.  ? Lifting weights.  ? Using resistance bands.  · Getting short amounts of exercise can be just as helpful as long structured periods of exercise. If you have trouble finding time to exercise, try to include exercise in your daily routine.  ? Get up, stretch, and walk around every 30 minutes throughout the day.  ? Go for a walk during your lunch break.  ? Park your car farther away from your destination.  ? If you take public transportation, get off one stop early and walk the rest of the way.  ? Make phone calls while standing up and walking around.  ? Take the stairs instead of elevators or escalators.  · Wear comfortable clothes and shoes with good support.  · Do not exercise so much that you hurt yourself, feel dizzy, or get very short of breath.  Where to find more information  · [place] Department of Health and Human Services: www.hhs.gov  · Centers for Disease Control and Prevention (CDC): www.cdc.gov  Contact a health care provider:  · Before starting a new exercise program.  · If you have questions or concerns about your weight.  · If you have a medical problem that keeps you from exercising.  Get help right away if you have any of the following while exercising:  · Injury.  · Dizziness.  · Difficulty breathing or shortness of breath that does not go away when you stop exercising.  · Chest pain.  · Rapid heartbeat.  Summary  · Being overweight increases your risk of heart disease, stroke, diabetes, high blood pressure, and several types of cancer.  · Losing weight happens when you burn more calories than you eat.  · Reducing the amount of calories you eat in addition to getting regular moderate or vigorous exercise each week helps you lose weight.  This information is not intended to replace advice given to you  by your health care provider. Make sure you discuss any questions you have with your health care provider.  Document Released: 01/20/2012 Document Revised: 12/31/2018 Document Reviewed: 12/31/2018  [employer] Interactive Patient Education © 2019 [employer]

## 2019-11-19 NOTE — PROGRESS NOTES
QUICK REFERENCE INFORMATION:  The ABCs of the Annual Wellness Visit    Subsequent Medicare Wellness Visit    HEALTH RISK ASSESSMENT    1960    Recent Hospitalizations:  No hospitalization(s) within the last year..        Current Medical Providers:  Patient Care Team:  Jovita Auguste APRN as PCP - General (Family Medicine)  Ras Mcconnell DO as PCP - Claims Attributed        Smoking Status:  Social History     Tobacco Use   Smoking Status Never Smoker   Smokeless Tobacco Never Used       Alcohol Consumption:  Social History     Substance and Sexual Activity   Alcohol Use No       Depression Screen:   PHQ-2/PHQ-9 Depression Screening 11/19/2019   Little interest or pleasure in doing things 1   Feeling down, depressed, or hopeless 0   Trouble falling or staying asleep, or sleeping too much 1   Feeling tired or having little energy 1   Poor appetite or overeating 0   Feeling bad about yourself - or that you are a failure or have let yourself or your family down 0   Trouble concentrating on things, such as reading the newspaper or watching television 0   Moving or speaking so slowly that other people could have noticed. Or the opposite - being so fidgety or restless that you have been moving around a lot more than usual 0   Thoughts that you would be better off dead, or of hurting yourself in some way 0   Total Score 3   If you checked off any problems, how difficult have these problems made it for you to do your work, take care of things at home, or get along with other people? Somewhat difficult       Health Habits and Functional and Cognitive Screening:  Functional & Cognitive Status 11/19/2019   Do you have difficulty preparing food and eating? No   Do you have difficulty bathing yourself, getting dressed or grooming yourself? No   Do you have difficulty using the toilet? No   Do you have difficulty moving around from place to place? No   Do you have trouble with steps or getting out of a bed or a chair?  Yes   Current Diet Limited Junk Food   Dental Exam Up to date   Eye Exam Up to date   Exercise (times per week) 0 times per week   Current Exercise Activities Include None   Do you need help using the phone?  Yes   Are you deaf or do you have serious difficulty hearing?  No   Do you need help with transportation? No   Do you need help shopping? No   Do you need help preparing meals?  No   Do you need help with housework?  No   Do you need help with laundry? No   Do you need help taking your medications? No   Do you need help managing money? No   Do you ever drive or ride in a car without wearing a seat belt? No   Have you felt unusual stress, anger or loneliness in the last month? Yes   Who do you live with? Spouse   If you need help, do you have trouble finding someone available to you? No   Have you been bothered in the last four weeks by sexual problems? No   Do you have difficulty concentrating, remembering or making decisions? No           Does the patient have evidence of cognitive impairment? No    Aspirin use counseling: Does not need ASA (and currently is not on it)      Recent Lab Results:  CMP:  Lab Results   Component Value Date    GLU 76 05/20/2019    BUN 20 05/20/2019    CREATININE 0.72 05/20/2019    EGFRIFNONA 83 05/20/2019    EGFRIFAFRI 100 05/20/2019    BCR 27.8 (H) 05/20/2019     05/20/2019    K 4.3 05/20/2019    CO2 28.7 05/20/2019    CALCIUM 10.0 05/20/2019    PROTENTOTREF 7.2 05/20/2019    ALBUMIN 4.00 05/20/2019    LABGLOBREF 3.2 05/20/2019    LABIL2 1.3 05/20/2019    BILITOT 0.3 05/20/2019    ALKPHOS 107 05/20/2019    AST 29 05/20/2019    ALT 39 (H) 05/20/2019     Lipid Panel:  Lab Results   Component Value Date    TRIG 214 (H) 11/19/2018    HDL 48 11/19/2018    VLDL 42.8 (H) 11/19/2018     HbA1c:       Visual Acuity:  No exam data present    Age-appropriate Screening Schedule:  Refer to the list below for future screening recommendations based on patient's age, sex and/or medical  conditions. Orders for these recommended tests are listed in the plan section. The patient has been provided with a written plan.    Health Maintenance   Topic Date Due   • PAP SMEAR  05/21/2018   • LIPID PANEL  11/19/2019   • MAMMOGRAM  01/02/2020   • TDAP/TD VACCINES (2 - Td) 08/16/2026   • COLONOSCOPY  12/28/2026   • INFLUENZA VACCINE  Completed        Subjective   History of Present Illness    Marizol Park is a 59 y.o. female who presents for an Subsequent Wellness Visit.    The following portions of the patient's history were reviewed and updated as appropriate: allergies, current medications, past family history, past medical history, past social history, past surgical history and problem list.    Outpatient Medications Prior to Visit   Medication Sig Dispense Refill   • adalimumab (HUMIRA) 40 MG/0.8ML Prefilled Syringe Kit injection Inject 40 mg under the skin 1 (One) Time. Once every 2 weeks     • ketorolac (ACULAR) 0.5 % ophthalmic solution PLACE 1 DROP IN OS TID FOR 3 DAYS THEN STOP  0   • lisinopril (PRINIVIL,ZESTRIL) 20 MG tablet TAKE 1 TABLET DAILY FOR    BLOOD PRESSURE 90 tablet 1   • omeprazole (priLOSEC) 40 MG capsule TAKE 1 CAPSULE TWICE DAILY 180 capsule 1   • Pediatric Multivitamins-Iron (FLINTSTONES PLUS IRON PO) Take 2 tablets by mouth Daily.     • raNITIdine (ZANTAC) 300 MG tablet TK 1 T PO Q NIGHT  4   • traZODone (DESYREL) 50 MG tablet Take 1 tablet by mouth Every Night. 30 tablet 1   • venlafaxine (EFFEXOR) 150 MG tablet sustained-release 24 hour 24 hr tablet TAKE 1 TABLET DAILY 90 tablet 1   • busPIRone (BUSPAR) 10 MG tablet Take 1 tablet by mouth Daily. 30 tablet 5     No facility-administered medications prior to visit.        Patient Active Problem List   Diagnosis   • Acute post-traumatic stress disorder   • Anxiety   • Benign essential hypertension   • Excessive cerumen in ear canal   • Cough   • Dyslipidemia   • External hemorrhoids   • Gastroesophageal reflux disease   • Hemorrhoids    • Panic disorder without agoraphobia   • Peripheral neuropathy   • Ulcerative colitis (CMS/HCC)   • Ulnar neuropathy   • Immunocompromised state (CMS/HCC)   • High triglycerides       Advance Care Planning:  Patient does not have an advance directive - information provided to the patient today    Identification of Risk Factors:  Risk factors include: Obesity/Overweight .    Review of Systems   Respiratory: Negative for shortness of breath.    Cardiovascular: Negative for chest pain, palpitations, orthopnea and PND.   Gastrointestinal: Negative for change in bowel habit.   Psychiatric/Behavioral: The patient is nervous/anxious.        Compared to one year ago, the patient feels her physical health is the same.  Compared to one year ago, the patient feels her mental health is the same.    Objective     Physical Exam   Constitutional: She appears well-developed and well-nourished.   HENT:   Head: Normocephalic and atraumatic.   Neck: Neck supple. No JVD present. No thyromegaly present.   Cardiovascular: Normal rate, regular rhythm and normal heart sounds. Exam reveals no gallop and no friction rub.   No murmur heard.  Pulmonary/Chest: Effort normal and breath sounds normal. No respiratory distress. She has no wheezes. She has no rales.   Abdominal: Soft. Bowel sounds are normal. She exhibits no distension. There is no tenderness. There is no rebound and no guarding.   Musculoskeletal: She exhibits no edema.   Neurological: She is alert.   Skin: Skin is warm and dry.   Excoriations over back, solar lentigo, vitiligo noted;    Psychiatric: She has a normal mood and affect. Her behavior is normal.   Nursing note and vitals reviewed.      Vitals:    11/19/19 1103   BP: 120/76   Pulse: 100   Temp: 97.7 °F (36.5 °C)   SpO2: 97%   Weight: 62.6 kg (138 lb)       Patient's Body mass index is 26.95 kg/m². BMI is above normal parameters. Recommendations include: educational material.      Assessment/Plan   Patient  Self-Management and Personalized Health Advice  The patient has been provided with information about: diet and exercise and preventive services including:   · Annual Wellness Visit (AWV).    Visit Diagnoses:    ICD-10-CM ICD-9-CM   1. Medicare annual wellness visit, subsequent Z00.00 V70.0   2. Benign essential hypertension I10 401.1   3. Ulcerative rectosigmoiditis without complication (CMS/[facility]) K51.30 556.3   4. High triglycerides E78.1 272.1   5. Anxiety F41.9 300.00   6. Immunocompromised state (CMS/[facility]) D89.9 279.9   7. Sun-damaged skin L57.8 692.79       Orders Placed This Encounter   Procedures   • Comprehensive Metabolic Panel   • Lipid Panel   • QuantiFERON TB Gold     Fax results to Dr. Demarco fax [phone]   • Ambulatory Referral to Dermatology     Referral Priority:   Routine     Referral Type:   Consultation     Referral Reason:   Specialty Services Required     Referred to Provider:   Bj Barbosa MD     Requested Specialty:   Dermatology     Number of Visits Requested:   1   • CBC & Differential     Order Specific Question:   Manual Differential     Answer:   No       Outpatient Encounter Medications as of 11/19/2019   Medication Sig Dispense Refill   • adalimumab (HUMIRA) 40 MG/0.8ML Prefilled Syringe Kit injection Inject 40 mg under the skin 1 (One) Time. Once every 2 weeks     • busPIRone (BUSPAR) 15 MG tablet Take 1 tablet by mouth Daily.     • ketorolac (ACULAR) 0.5 % ophthalmic solution PLACE 1 DROP IN OS TID FOR 3 DAYS THEN STOP  0   • lisinopril (PRINIVIL,ZESTRIL) 20 MG tablet TAKE 1 TABLET DAILY FOR    BLOOD PRESSURE 90 tablet 1   • omeprazole (priLOSEC) 40 MG capsule TAKE 1 CAPSULE TWICE DAILY 180 capsule 1   • Pediatric Multivitamins-Iron (FLINTSTONES PLUS IRON PO) Take 2 tablets by mouth Daily.     • raNITIdine (ZANTAC) 300 MG tablet TK 1 T PO Q NIGHT  4   • traZODone (DESYREL) 50 MG tablet Take 1 tablet by mouth Every Night. 30 tablet 1   • venlafaxine (EFFEXOR) 150 MG tablet  sustained-release 24 hour 24 hr tablet TAKE 1 TABLET DAILY 90 tablet 1   • [DISCONTINUED] busPIRone (BUSPAR) 10 MG tablet Take 1 tablet by mouth Daily. 30 tablet 5     No facility-administered encounter medications on file as of 11/19/2019.        Reviewed use of high risk medication in the elderly: yes  Reviewed for potential of harmful drug interactions in the elderly: yes    Follow Up:  Return in about 6 months (around 5/19/2020), or if symptoms worsen or fail to improve.     An After Visit Summary and PPPS with all of these plans were given to the patient.           ++++++++++++++++++++++++++++++++++++++++++++++++++++++++++++++++++   Chief Complaint   Patient presents with   • Annual Exam     medicare wellness exam   • Hypertension   • Hyperlipidemia   • Inflammatory Bowel Disease     UC;  hx of anemia     Here for chronic disease management and above.    Hypertension   This is a chronic problem. The current episode started more than 1 year ago. The problem is controlled. Associated symptoms include anxiety. Pertinent negatives include no chest pain, orthopnea, palpitations, peripheral edema, PND or shortness of breath. There are no associated agents to hypertension. Risk factors for coronary artery disease include dyslipidemia and post-menopausal state. Past treatments include ACE inhibitors. Current antihypertension treatment includes ACE inhibitors. The current treatment provides moderate improvement. There are no compliance problems.    Hyperlipidemia   This is a chronic problem. The current episode started more than 1 year ago. The problem is uncontrolled. Recent lipid tests were reviewed and are high. Exacerbating diseases include obesity. Pertinent negatives include no chest pain or shortness of breath. Current antihyperlipidemic treatment includes diet change. The current treatment provides mild improvement of lipids. Risk factors for coronary artery disease include dyslipidemia, hypertension and  post-menopausal.   Inflammatory Bowel Disease   This is a chronic problem. The current episode started more than 1 year ago. Pertinent negatives include no change in bowel habit or chest pain. Associated symptoms comments: Reports some dyspepsia but stressed as family had stroke, caring for grandchild and holidays.  . Treatments tried: On humira. The treatment provided significant relief.   Anxiety   Presents for follow-up visit. Symptoms include excessive worry and nervous/anxious behavior. Patient reports no chest pain, depressed mood, palpitations or shortness of breath. Symptoms occur most days. The severity of symptoms is moderate. The quality of sleep is fair. Nighttime awakenings: occasional.     Compliance with medications is %. Treatment side effects: no side effects meds;  would like see therapist.   Skin changes  Skin changes - has solar damage;  Back itching would like checked    Review of Systems   Cardiovascular: Negative for chest pain, orthopnea, palpitations and paroxysmal nocturnal dyspnea.   Respiratory: Negative for shortness of breath.    Gastrointestinal: Negative for change in bowel habit.   Psychiatric/Behavioral: The patient is nervous/anxious.        Social History     Tobacco Use   • Smoking status: Never Smoker   • Smokeless tobacco: Never Used   Substance Use Topics   • Alcohol use: No     O:   Vitals:    11/19/19 1103   BP: 120/76   Pulse: 100   Temp: 97.7 °F (36.5 °C)   SpO2: 97%   Weight: 62.6 kg (138 lb)       Physical Exam   Constitutional: She appears well-developed and well-nourished.   HENT:   Head: Normocephalic and atraumatic.   Neck: Neck supple. No JVD present. No thyromegaly present.   Cardiovascular: Normal rate, regular rhythm and normal heart sounds. Exam reveals no gallop and no friction rub.   No murmur heard.  Pulmonary/Chest: Effort normal and breath sounds normal. No respiratory distress. She has no wheezes. She has no rales.   Abdominal: Soft. Bowel sounds are  normal. She exhibits no distension. There is no tenderness. There is no rebound and no guarding.   Musculoskeletal: She exhibits no edema.   Neurological: She is alert.   Skin: Skin is warm and dry.   Excoriations over back, solar lentigo, vitiligo noted;    Psychiatric: She has a normal mood and affect. Her behavior is normal.   Nursing note and vitals reviewed.      Marizol was seen today for annual exam, hypertension, hyperlipidemia and inflammatory bowel disease.    Diagnoses and all orders for this visit:    Medicare annual wellness visit, subsequent    Benign essential hypertension  -     Comprehensive Metabolic Panel    Ulcerative rectosigmoiditis without complication (CMS/HCC)  -     CBC & Differential    High triglycerides  -     Lipid Panel    Anxiety  -     busPIRone (BUSPAR) 15 MG tablet; Take 1 tablet by mouth Daily.    Immunocompromised state (CMS/[facility])  -     QuantiFERON TB Gold    Sun-damaged skin  -     Ambulatory Referral to Dermatology    -gave names of several therapists in this area to help with anxiety;  Also has several 7.5mg ok take 2 in am to help with anxiety.    -hypertension - controlled, continue medications  -hld - due recheck today; work on diet;  Primarily tgs high  -uc - doing ok on humira;  Needs TB test up date for Dr. Demarco, gave order  -sun damaged skin refer to dermatology    Return in about 6 months (around 5/19/2020), or if symptoms worsen or fail to improve.

## 2019-11-20 LAB
ALBUMIN SERPL-MCNC: 4.2 G/DL (ref 3.5–5.2)
ALBUMIN/GLOB SERPL: 1 G/DL
ALP SERPL-CCNC: 126 U/L (ref 39–117)
ALT SERPL-CCNC: 33 U/L (ref 1–33)
AST SERPL-CCNC: 21 U/L (ref 1–32)
BASOPHILS # BLD AUTO: 0.06 10*3/MM3 (ref 0–0.2)
BASOPHILS NFR BLD AUTO: 0.5 % (ref 0–1.5)
BILIRUB SERPL-MCNC: 0.3 MG/DL (ref 0.2–1.2)
BUN SERPL-MCNC: 17 MG/DL (ref 8–23)
BUN/CREAT SERPL: 25.8 (ref 7–25)
CALCIUM SERPL-MCNC: 9.5 MG/DL (ref 8.6–10.5)
CHLORIDE SERPL-SCNC: 96 MMOL/L (ref 98–107)
CHOLEST SERPL-MCNC: 298 MG/DL (ref 0–200)
CO2 SERPL-SCNC: 29.1 MMOL/L (ref 22–29)
CREAT SERPL-MCNC: 0.66 MG/DL (ref 0.57–1)
EOSINOPHIL # BLD AUTO: 0.13 10*3/MM3 (ref 0–0.4)
EOSINOPHIL NFR BLD AUTO: 1 % (ref 0.3–6.2)
ERYTHROCYTE [DISTWIDTH] IN BLOOD BY AUTOMATED COUNT: 11.9 % (ref 12.3–15.4)
GLOBULIN SER CALC-MCNC: 4.1 GM/DL
GLUCOSE SERPL-MCNC: 93 MG/DL (ref 65–99)
HCT VFR BLD AUTO: 40.7 % (ref 34–46.6)
HDLC SERPL-MCNC: 41 MG/DL (ref 40–60)
HGB BLD-MCNC: 13.6 G/DL (ref 12–15.9)
IMM GRANULOCYTES # BLD AUTO: 0.08 10*3/MM3 (ref 0–0.05)
IMM GRANULOCYTES NFR BLD AUTO: 0.6 % (ref 0–0.5)
LDLC SERPL CALC-MCNC: 218 MG/DL (ref 0–100)
LYMPHOCYTES # BLD AUTO: 3.91 10*3/MM3 (ref 0.7–3.1)
LYMPHOCYTES NFR BLD AUTO: 30.2 % (ref 19.6–45.3)
MCH RBC QN AUTO: 29.4 PG (ref 26.6–33)
MCHC RBC AUTO-ENTMCNC: 33.4 G/DL (ref 31.5–35.7)
MCV RBC AUTO: 87.9 FL (ref 79–97)
MONOCYTES # BLD AUTO: 1.18 10*3/MM3 (ref 0.1–0.9)
MONOCYTES NFR BLD AUTO: 9.1 % (ref 5–12)
NEUTROPHILS # BLD AUTO: 7.59 10*3/MM3 (ref 1.7–7)
NEUTROPHILS NFR BLD AUTO: 58.6 % (ref 42.7–76)
NRBC BLD AUTO-RTO: 0 /100 WBC (ref 0–0.2)
PLATELET # BLD AUTO: 406 10*3/MM3 (ref 140–450)
POTASSIUM SERPL-SCNC: 4.5 MMOL/L (ref 3.5–5.2)
PROT SERPL-MCNC: 8.3 G/DL (ref 6–8.5)
RBC # BLD AUTO: 4.63 10*6/MM3 (ref 3.77–5.28)
SODIUM SERPL-SCNC: 138 MMOL/L (ref 136–145)
TRIGL SERPL-MCNC: 195 MG/DL (ref 0–150)
VLDLC SERPL CALC-MCNC: 39 MG/DL
WBC # BLD AUTO: 12.95 10*3/MM3 (ref 3.4–10.8)

## 2019-11-22 LAB
GAMMA INTERFERON BACKGROUND BLD IA-ACNC: 0.04 IU/ML
M TB IFN-G BLD-IMP: NEGATIVE
M TB IFN-G CD4+ BCKGRND COR BLD-ACNC: 0.05 IU/ML
MITOGEN IGNF BLD-ACNC: >10 IU/ML
QUANTIFERON INCUBATION: NORMAL
QUANTIFERON TB2 AG VALUE: 0.05 IU/ML
SERVICE CMNT-IMP: NORMAL

## 2019-11-24 NOTE — PROGRESS NOTES
Call and mail copy of results to patient.  TB test negative, fax copy to Dr. Demarco  Cholesterol very, very high.  Highly recommend start crestor 10mg po daily if can tolerate  WBC mildly elevated, will monitor  Kidney and liver function normal.

## 2019-11-26 RX ORDER — ROSUVASTATIN CALCIUM 20 MG/1
20 TABLET, COATED ORAL NIGHTLY
Qty: 30 TABLET | Refills: 5 | Status: SHIPPED | OUTPATIENT
Start: 2019-11-26 | End: 2020-06-04 | Stop reason: SDUPTHER

## 2019-12-23 ENCOUNTER — TELEPHONE (OUTPATIENT)
Dept: FAMILY MEDICINE CLINIC | Facility: CLINIC | Age: 59
End: 2019-12-23

## 2019-12-23 NOTE — TELEPHONE ENCOUNTER
Pt says she is having cramping in her toes off and on that started 1 week ago. She thinks this may be due to the crestor and wants to know If she should stop taking it.     [phone]

## 2020-01-20 ENCOUNTER — OFFICE VISIT (OUTPATIENT)
Dept: FAMILY MEDICINE CLINIC | Facility: CLINIC | Age: 60
End: 2020-01-20

## 2020-01-20 VITALS
WEIGHT: 140 LBS | BODY MASS INDEX: 27.48 KG/M2 | OXYGEN SATURATION: 99 % | HEART RATE: 100 BPM | HEIGHT: 60 IN | TEMPERATURE: 98.2 F | DIASTOLIC BLOOD PRESSURE: 70 MMHG | SYSTOLIC BLOOD PRESSURE: 124 MMHG

## 2020-01-20 DIAGNOSIS — M79.601 ARM PAIN, DIFFUSE, RIGHT: ICD-10-CM

## 2020-01-20 DIAGNOSIS — S49.91XA RIGHT SHOULDER INJURY, INITIAL ENCOUNTER: Primary | ICD-10-CM

## 2020-01-20 PROCEDURE — 99213 OFFICE O/P EST LOW 20 MIN: CPT | Performed by: NURSE PRACTITIONER

## 2020-01-20 NOTE — PROGRESS NOTES
"Marek Park is a 59 y.o. female who presents c/o pain in right arm x several weeks with no known injury. Left arm is now starting to hurt too, thinks may be related to crestor.     History of Present Illness   Initially had muscle pain in R forearm, then R upper arm, then shoulder, started before xmas. Worse after moving a table. Did cut back cholesterol medication around same time as was having toe cramping, now toes no longer cramp. Now L arm starting to have similar symptoms. Went from 20 mg crestor to 10 in November by chart review.  The following portions of the patient's history were reviewed and updated as appropriate: allergies, current medications, past family history, past medical history, past social history, past surgical history and problem list.    Review of Systems   Constitutional: Positive for activity change and fatigue.   Musculoskeletal: Positive for arthralgias, myalgias, neck stiffness and bursitis. Negative for gait problem and joint swelling.   Neurological: Negative for weakness, numbness and headache.   Psychiatric/Behavioral: Positive for stress. Negative for behavioral problems, sleep disturbance and depressed mood.     /70   Pulse 100   Temp 98.2 °F (36.8 °C) (Oral)   Ht 152.4 cm (60\")   Wt 63.5 kg (140 lb)   SpO2 99%   BMI 27.34 kg/m²     Objective   Physical Exam   Musculoskeletal:        Right shoulder: She exhibits decreased range of motion, tenderness, bony tenderness, crepitus, pain, spasm and decreased strength.        Cervical back: She exhibits decreased range of motion (mild decrease R rotation), tenderness (R L/S spasm) and spasm. She exhibits no bony tenderness, no edema and no pain.        Right upper arm: She exhibits tenderness (biceps pain). She exhibits no bony tenderness, no swelling, no edema and no deformity.        Right forearm: She exhibits tenderness. She exhibits no bony tenderness, no swelling, no edema and no deformity.        Right " hand: Normal sensation noted. Normal strength noted.   Nursing note and vitals reviewed.    Assessment/Plan   Problems Addressed this Visit     None      Visit Diagnoses     Right shoulder injury, initial encounter    -  Primary    Relevant Orders    Comprehensive Metabolic Panel    CK    Sedimentation Rate    C-reactive Protein    Arm pain, diffuse, right        Relevant Orders    Comprehensive Metabolic Panel    CK    Sedimentation Rate    C-reactive Protein        R shoulder pain--favor subacromial bursitis--alternate ice and heat to address along with tylenol for pain. If not settling 2 weeks, consider injection.  R arm pain--doubt crestor--will check labs--if normal--would continue crestor--discussed role and purpose

## 2020-01-21 LAB
ALBUMIN SERPL-MCNC: 4.2 G/DL (ref 3.8–4.9)
ALBUMIN/GLOB SERPL: 1.3 {RATIO} (ref 1.2–2.2)
ALP SERPL-CCNC: 97 IU/L (ref 39–117)
ALT SERPL-CCNC: 28 IU/L (ref 0–32)
AST SERPL-CCNC: 26 IU/L (ref 0–40)
BILIRUB SERPL-MCNC: 0.2 MG/DL (ref 0–1.2)
BUN SERPL-MCNC: 18 MG/DL (ref 8–27)
BUN/CREAT SERPL: 25 (ref 12–28)
CALCIUM SERPL-MCNC: 9.3 MG/DL (ref 8.7–10.3)
CHLORIDE SERPL-SCNC: 101 MMOL/L (ref 96–106)
CK SERPL-CCNC: 48 U/L (ref 24–173)
CO2 SERPL-SCNC: 26 MMOL/L (ref 20–29)
CREAT SERPL-MCNC: 0.73 MG/DL (ref 0.57–1)
CRP SERPL-MCNC: 3 MG/L (ref 0–10)
ERYTHROCYTE [SEDIMENTATION RATE] IN BLOOD BY WESTERGREN METHOD: 18 MM/HR (ref 0–40)
GLOBULIN SER CALC-MCNC: 3.2 G/DL (ref 1.5–4.5)
GLUCOSE SERPL-MCNC: 96 MG/DL (ref 65–99)
POTASSIUM SERPL-SCNC: 4.1 MMOL/L (ref 3.5–5.2)
PROT SERPL-MCNC: 7.4 G/DL (ref 6–8.5)
SODIUM SERPL-SCNC: 141 MMOL/L (ref 134–144)

## 2020-01-27 RX ORDER — LISINOPRIL 20 MG/1
TABLET ORAL
Qty: 90 TABLET | Refills: 1 | Status: SHIPPED | OUTPATIENT
Start: 2020-01-27 | End: 2020-07-27

## 2020-02-03 ENCOUNTER — OFFICE VISIT (OUTPATIENT)
Dept: FAMILY MEDICINE CLINIC | Facility: CLINIC | Age: 60
End: 2020-02-03

## 2020-02-03 VITALS
OXYGEN SATURATION: 96 % | HEIGHT: 60 IN | TEMPERATURE: 97.9 F | DIASTOLIC BLOOD PRESSURE: 74 MMHG | HEART RATE: 80 BPM | WEIGHT: 140 LBS | BODY MASS INDEX: 27.48 KG/M2 | SYSTOLIC BLOOD PRESSURE: 118 MMHG

## 2020-02-03 DIAGNOSIS — M25.511 RIGHT ANTERIOR SHOULDER PAIN: Primary | ICD-10-CM

## 2020-02-03 PROCEDURE — 99213 OFFICE O/P EST LOW 20 MIN: CPT | Performed by: NURSE PRACTITIONER

## 2020-02-03 PROCEDURE — 20610 DRAIN/INJ JOINT/BURSA W/O US: CPT | Performed by: NURSE PRACTITIONER

## 2020-02-03 PROCEDURE — 73030 X-RAY EXAM OF SHOULDER: CPT | Performed by: NURSE PRACTITIONER

## 2020-02-03 RX ORDER — LIDOCAINE HYDROCHLORIDE 20 MG/ML
1 INJECTION, SOLUTION INFILTRATION; PERINEURAL ONCE
OUTPATIENT
Start: 2020-02-03

## 2020-02-03 RX ORDER — TRIAMCINOLONE ACETONIDE 40 MG/ML
40 INJECTION, SUSPENSION INTRA-ARTICULAR; INTRAMUSCULAR ONCE
Status: DISCONTINUED | OUTPATIENT
Start: 2020-02-03 | End: 2021-09-14

## 2020-02-03 NOTE — PROGRESS NOTES
"Marek Park is a 59 y.o. female who presents for a follow up on right shoulder pain.     History of Present Illness   Onset of shoulder pain before [place], worse after moving a table. Seen 2 weeks ago and encouraged to treat conservatively with tylenol, ice, heat. Now reports calmed down some, tried hard not to sleep on arm, but difficult not to do. Pain now primarily deltoid and anterior. No neck pain, no distal arm pain today.   The following portions of the patient's history were reviewed and updated as appropriate: allergies, current medications, past family history, past medical history, past social history, past surgical history and problem list.    Review of Systems   Constitutional: Negative for activity change, appetite change and unexpected weight gain.   Respiratory: Negative for shortness of breath.    Cardiovascular: Negative.    Endocrine: Negative.    Musculoskeletal: Positive for arthralgias, myalgias and bursitis. Negative for back pain, gait problem, neck pain and neck stiffness.   Skin: Negative.    Neurological: Positive for weakness (at extremes of ROM). Negative for numbness.   Hematological: Negative.    Psychiatric/Behavioral: Negative.  Positive for depressed mood and stress.     /74   Pulse 80   Temp 97.9 °F (36.6 °C) (Oral)   Ht 152.4 cm (60\")   Wt 63.5 kg (140 lb)   SpO2 96%   BMI 27.34 kg/m²     Objective   Physical Exam   Constitutional: She appears well-developed and well-nourished. No distress.   Cardiovascular: Normal rate.   Pulmonary/Chest: Effort normal.   Musculoskeletal:        Right shoulder: She exhibits decreased range of motion, tenderness, bony tenderness, crepitus, pain, spasm and decreased strength.   Skin: Skin is warm and dry.   Psychiatric: She has a normal mood and affect. Her behavior is normal. Judgment and thought content normal.   Nursing note and vitals reviewed.    Assessment/Plan   Problems Addressed this Visit        Nervous and " "Auditory    Right anterior shoulder pain - Primary    Relevant Medications    lidocaine (XYLOCAINE) 2% injection 1 mL    triamcinolone acetonide (KENALOG-40) injection 40 mg    Other Relevant Orders    XR Shoulder 2+ View Right        Xray with NAD, no comparison, will send to radiology for additional interpretation. Failed conservative. Reasonable for injection. If insufficient relief recommend PT. To call if needs. Of note, excellent initial response to injection.    Arthrocentesis  Date/Time: 2/3/2020 11:05 AM  Performed by: Jovita Auguste APRN  Authorized by: Jovita Auguste APRN   Consent: Verbal consent obtained.  Risks and benefits: risks, benefits and alternatives were discussed  Consent given by: patient  Patient understanding: patient states understanding of the procedure being performed  Patient consent: the patient's understanding of the procedure matches consent given  Procedure consent: procedure consent matches procedure scheduled  Relevant documents: relevant documents present and verified  Test results: test results available and properly labeled  Imaging studies: imaging studies available  Patient identity confirmed: verbally with patient  Time out: Immediately prior to procedure a \"time out\" was called to verify the correct patient, procedure, equipment, support staff and site/side marked as required.  Indications: pain and diagnostic evaluation   Body area: shoulder  Joint: right subacromial bursa  Local anesthesia used: no    Anesthesia:  Local anesthesia used: no    Sedation:  Patient sedated: no    Preparation: Patient was prepped and draped in the usual sterile fashion.  Needle size: 22 G  Ultrasound guidance: no  Approach: posterior  Aspirate amount: 0 mL  Patient tolerance: Patient tolerated the procedure well with no immediate complications  Comments: To go home and apply ice for 20 min, light activity only today, and whatever tolerated tomorrow. May resume usual duties tomorrow., " call if redness swelling, or other concerns

## 2020-03-02 ENCOUNTER — OFFICE VISIT (OUTPATIENT)
Dept: FAMILY MEDICINE CLINIC | Facility: CLINIC | Age: 60
End: 2020-03-02

## 2020-03-02 VITALS
WEIGHT: 140 LBS | OXYGEN SATURATION: 97 % | DIASTOLIC BLOOD PRESSURE: 78 MMHG | HEART RATE: 106 BPM | BODY MASS INDEX: 27.48 KG/M2 | SYSTOLIC BLOOD PRESSURE: 122 MMHG | TEMPERATURE: 97.7 F | HEIGHT: 60 IN

## 2020-03-02 DIAGNOSIS — J06.0 SORE THROAT AND LARYNGITIS: Primary | ICD-10-CM

## 2020-03-02 LAB
EXPIRATION DATE: NORMAL
INTERNAL CONTROL: NORMAL
Lab: NORMAL
S PYO AG THROAT QL: NEGATIVE

## 2020-03-02 PROCEDURE — 99213 OFFICE O/P EST LOW 20 MIN: CPT | Performed by: NURSE PRACTITIONER

## 2020-03-02 PROCEDURE — 87880 STREP A ASSAY W/OPTIC: CPT | Performed by: NURSE PRACTITIONER

## 2020-03-02 RX ORDER — FLUTICASONE PROPIONATE 50 MCG
2 SPRAY, SUSPENSION (ML) NASAL DAILY
Qty: 9.9 ML | Refills: 0 | Status: SHIPPED | OUTPATIENT
Start: 2020-03-02 | End: 2020-03-30

## 2020-03-03 DIAGNOSIS — F41.9 ANXIETY: ICD-10-CM

## 2020-03-03 RX ORDER — VENLAFAXINE HYDROCHLORIDE 150 MG/1
TABLET, EXTENDED RELEASE ORAL
Qty: 90 TABLET | Refills: 1 | Status: SHIPPED | OUTPATIENT
Start: 2020-03-03 | End: 2020-08-17 | Stop reason: SDUPTHER

## 2020-03-11 RX ORDER — OMEPRAZOLE 40 MG/1
CAPSULE, DELAYED RELEASE ORAL
Qty: 180 CAPSULE | Refills: 1 | Status: SHIPPED | OUTPATIENT
Start: 2020-03-11 | End: 2020-08-24

## 2020-03-29 DIAGNOSIS — J06.0 SORE THROAT AND LARYNGITIS: ICD-10-CM

## 2020-03-30 RX ORDER — FLUTICASONE PROPIONATE 50 MCG
SPRAY, SUSPENSION (ML) NASAL
Qty: 16 G | Refills: 2 | Status: SHIPPED | OUTPATIENT
Start: 2020-03-30 | End: 2020-09-02 | Stop reason: SDUPTHER

## 2020-03-31 ENCOUNTER — OFFICE VISIT (OUTPATIENT)
Dept: FAMILY MEDICINE CLINIC | Facility: CLINIC | Age: 60
End: 2020-03-31

## 2020-03-31 VITALS
WEIGHT: 141 LBS | OXYGEN SATURATION: 96 % | BODY MASS INDEX: 27.68 KG/M2 | TEMPERATURE: 98.3 F | DIASTOLIC BLOOD PRESSURE: 76 MMHG | SYSTOLIC BLOOD PRESSURE: 120 MMHG | HEIGHT: 60 IN | HEART RATE: 95 BPM

## 2020-03-31 DIAGNOSIS — T78.40XS ALLERGIC STATE, SEQUELA: Primary | ICD-10-CM

## 2020-03-31 PROCEDURE — 99212 OFFICE O/P EST SF 10 MIN: CPT | Performed by: NURSE PRACTITIONER

## 2020-03-31 NOTE — PROGRESS NOTES
"Marek Park is a 59 y.o. female who presents c/o hoarseness, sore throat since this morning. Has had night sweats for a while, unsure if related.     History of Present Illness   Night sweats started more than 3 months ago. Thinks has same thing now that had at beginning of March (seen by Hortencia). Got scared as had to be around a lot of people. Currently cleaning out mother in laws apartment. Typically 5-6 people have been helping her. At time seen had ST and cough. Now having ST and cough--but resolves with throat spray. flonase really helped at the time. Has been using flonase sporadically since as doesn't like taste/smell. No known COVID 19 exposure. Night time cough and ST only symptoms.   The following portions of the patient's history were reviewed and updated as appropriate: allergies, current medications, past family history, past medical history, past social history, past surgical history and problem list.    Review of Systems   Constitutional: Negative for activity change, chills, fatigue, fever, unexpected weight gain and unexpected weight loss.   HENT: Positive for sore throat. Negative for congestion, ear pain and rhinorrhea.    Respiratory: Positive for cough (resolves readily with choraseptic). Negative for shortness of breath and wheezing.    Cardiovascular: Negative.    Endocrine: Positive for heat intolerance (night sweats).   Musculoskeletal: Negative.    Neurological: Negative for headache.   Psychiatric/Behavioral: The patient is nervous/anxious.      /76   Pulse 95   Temp 98.3 °F (36.8 °C) (Oral)   Ht 152.4 cm (60\")   Wt 64 kg (141 lb)   SpO2 96%   BMI 27.54 kg/m²     Objective   Physical Exam   Constitutional: She is oriented to person, place, and time. She appears well-developed and well-nourished.   HENT:   Right Ear: A middle ear effusion is present.   Left Ear: A middle ear effusion is present.   Nose: Nose normal. Right sinus exhibits no maxillary sinus " tenderness and no frontal sinus tenderness. Left sinus exhibits no maxillary sinus tenderness and no frontal sinus tenderness.   Mouth/Throat: Uvula is midline, oropharynx is clear and moist and mucous membranes are normal. No tonsillar exudate.   Cardiovascular: Normal rate, regular rhythm and normal heart sounds.   Pulmonary/Chest: Effort normal and breath sounds normal.   Lymphadenopathy:        Head (right side): No submental, no submandibular, no tonsillar, no preauricular and no posterior auricular adenopathy present.        Head (left side): No submental, no submandibular, no tonsillar, no preauricular and no posterior auricular adenopathy present.     She has no cervical adenopathy.   Neurological: She is alert and oriented to person, place, and time.   Psychiatric: Her speech is normal and behavior is normal. Thought content normal. Her mood appears anxious.   Nursing note and vitals reviewed.    Assessment/Plan   Problems Addressed this Visit     None      Visit Diagnoses     Allergic state, sequela    -  Primary        Allergies--no concerning findings. Will restart flonase. If cannot tolerate enough to settle symptoms, consider nasocort. To call if needed. Encouraged to apply for mychart.   No concerning contacts with COVID 19 detected. Encouraged to maintain social distancing when working on appt.

## 2020-05-15 ENCOUNTER — TELEHEALTH PROVIDED OTHER THAN IN PATIENT'S HOME (OUTPATIENT)
Dept: URBAN - METROPOLITAN AREA TELEHEALTH 5 | Facility: TELEHEALTH | Age: 60
End: 2020-05-15

## 2020-05-15 DIAGNOSIS — K57.90 DIVERTICULOSIS OF INTESTINE, PART UNSPECIFIED, WITHOUT PERFO: ICD-10-CM

## 2020-05-15 DIAGNOSIS — K51.50 LEFT SIDED COLITIS WITHOUT COMPLICATIONS: ICD-10-CM

## 2020-05-15 PROCEDURE — 99441: CPT | Performed by: INTERNAL MEDICINE

## 2020-05-21 ENCOUNTER — OFFICE VISIT (OUTPATIENT)
Dept: FAMILY MEDICINE CLINIC | Facility: CLINIC | Age: 60
End: 2020-05-21

## 2020-05-21 VITALS
OXYGEN SATURATION: 98 % | WEIGHT: 142 LBS | HEIGHT: 60 IN | DIASTOLIC BLOOD PRESSURE: 62 MMHG | BODY MASS INDEX: 27.88 KG/M2 | SYSTOLIC BLOOD PRESSURE: 122 MMHG | HEART RATE: 89 BPM

## 2020-05-21 DIAGNOSIS — K51.30 ULCERATIVE RECTOSIGMOIDITIS WITHOUT COMPLICATION (HCC): ICD-10-CM

## 2020-05-21 DIAGNOSIS — Z79.899 DRUG THERAPY: ICD-10-CM

## 2020-05-21 DIAGNOSIS — D64.9 ANEMIA, UNSPECIFIED TYPE: ICD-10-CM

## 2020-05-21 DIAGNOSIS — F41.9 ANXIETY: ICD-10-CM

## 2020-05-21 DIAGNOSIS — R53.83 OTHER FATIGUE: ICD-10-CM

## 2020-05-21 DIAGNOSIS — E78.5 DYSLIPIDEMIA: ICD-10-CM

## 2020-05-21 DIAGNOSIS — I10 BENIGN ESSENTIAL HYPERTENSION: Primary | ICD-10-CM

## 2020-05-21 PROCEDURE — 99214 OFFICE O/P EST MOD 30 MIN: CPT | Performed by: FAMILY MEDICINE

## 2020-05-21 NOTE — PROGRESS NOTES
Marek Park is a 59 y.o. female. Presents today for   Chief Complaint   Patient presents with   • Anxiety   • Hypertension   • Hyperlipidemia   • Ulcerative Colitis       Hypertension   This is a chronic problem. The current episode started more than 1 year ago. The problem is unchanged. The problem is controlled. Associated symptoms include anxiety. Pertinent negatives include no chest pain, orthopnea, palpitations, peripheral edema, PND or shortness of breath. There are no associated agents to hypertension. Risk factors for coronary artery disease include dyslipidemia and post-menopausal state. Past treatments include ACE inhibitors. Current antihypertension treatment includes ACE inhibitors. The current treatment provides moderate improvement. There are no compliance problems.    Hyperlipidemia   This is a chronic problem. The current episode started more than 1 year ago. The problem is uncontrolled. Recent lipid tests were reviewed and are high. Pertinent negatives include no chest pain or shortness of breath. Current antihyperlipidemic treatment includes statins. The current treatment provides moderate improvement of lipids. There are no compliance problems.  Risk factors for coronary artery disease include dyslipidemia and post-menopausal.   Anxiety   Presents for follow-up visit. Symptoms include excessive worry and nervous/anxious behavior. Patient reports no chest pain, nausea, palpitations or shortness of breath. Primary symptoms comment:  was in [employer], back out and drinking very heavily.. Symptoms occur occasionally. The quality of sleep is fair. Nighttime awakenings: occasional.     Compliance with medications is %. Treatment side effects: Doing ok, no side effects;  Hot flashes not helped by effexor.     Ulcerative Colitis   Patient on humira, doing well no diarrhea    Review of Systems   Constitutional: Positive for fatigue.   Respiratory: Negative for shortness of  breath.    Cardiovascular: Negative for chest pain, palpitations, orthopnea and PND.   Gastrointestinal: Negative for nausea and vomiting.   Psychiatric/Behavioral: The patient is nervous/anxious.        Patient Active Problem List   Diagnosis   • Acute post-traumatic stress disorder   • Anxiety   • Benign essential hypertension   • Excessive cerumen in ear canal   • Cough   • Dyslipidemia   • External hemorrhoids   • Gastroesophageal reflux disease   • Hemorrhoids   • Panic disorder without agoraphobia   • Peripheral neuropathy   • Ulcerative colitis (CMS/HCC)   • Ulnar neuropathy   • Immunocompromised state (CMS/HCC)   • High triglycerides   • Right anterior shoulder pain       Social History     Socioeconomic History   • Marital status:      Spouse name: Not on file   • Number of children: Not on file   • Years of education: Not on file   • Highest education level: Not on file   Tobacco Use   • Smoking status: Never Smoker   • Smokeless tobacco: Never Used   Substance and Sexual Activity   • Alcohol use: No   • Drug use: No   • Sexual activity: Never       Allergies   Allergen Reactions   • Remicade [Infliximab] Hives and Swelling     Facial swelling, hives wide spread   • Sulfa Antibiotics Hives       Current Outpatient Medications on File Prior to Visit   Medication Sig Dispense Refill   • adalimumab (HUMIRA) 40 MG/0.8ML Prefilled Syringe Kit injection Inject 40 mg under the skin 1 (One) Time. Once every 2 weeks     • busPIRone (BUSPAR) 15 MG tablet Take 1 tablet by mouth Daily.     • fluticasone (FLONASE) 50 MCG/ACT nasal spray INSTILL 2 SPRAYS IN EACH NOSTRIL DAILY X 30 DAYS. 16 g 2   • ketorolac (ACULAR) 0.5 % ophthalmic solution PLACE 1 DROP IN OS TID FOR 3 DAYS THEN STOP  0   • lisinopril (PRINIVIL,ZESTRIL) 20 MG tablet TAKE 1 TABLET DAILY FOR    BLOOD PRESSURE 90 tablet 1   • omeprazole (priLOSEC) 40 MG capsule TAKE 1 CAPSULE TWICE DAILY 180 capsule 1   • Pediatric Multivitamins-Iron (FLINTSTONES  "PLUS IRON PO) Take 2 tablets by mouth Daily.     • rosuvastatin (CRESTOR) 20 MG tablet Take 1 tablet by mouth Every Night. 30 tablet 5   • traZODone (DESYREL) 50 MG tablet Take 1 tablet by mouth Every Night. 30 tablet 1   • venlafaxine (EFFEXOR) 150 MG tablet sustained-release 24 hour 24 hr tablet TAKE 1 TABLET DAILY 90 tablet 1   • [DISCONTINUED] raNITIdine (ZANTAC) 300 MG tablet TK 1 T PO Q NIGHT  4     Current Facility-Administered Medications on File Prior to Visit   Medication Dose Route Frequency Provider Last Rate Last Dose   • lidocaine (XYLOCAINE) 2% injection 1 mL  1 mL Injection Once Jovita Auguste APRN       • triamcinolone acetonide (KENALOG-40) injection 40 mg  40 mg Intra-articular Once Jovita Auguste APRN           Objective   Vitals:    05/21/20 1038   BP: 122/62   BP Location: Right arm   Patient Position: Sitting   Cuff Size: Adult   Pulse: 89   SpO2: 98%   Weight: 64.4 kg (142 lb)   Height: 152.4 cm (60\")     Body mass index is 27.73 kg/m².    Physical Exam   Constitutional: She appears well-developed and well-nourished.   HENT:   Head: Normocephalic and atraumatic.   Neck: Neck supple. No JVD present. No thyromegaly present.   Cardiovascular: Normal rate, regular rhythm and normal heart sounds. Exam reveals no gallop and no friction rub.   No murmur heard.  Pulmonary/Chest: Effort normal and breath sounds normal. No respiratory distress. She has no wheezes. She has no rales.   Abdominal: Soft. Bowel sounds are normal. She exhibits no distension. There is no tenderness. There is no rebound and no guarding.   Musculoskeletal: She exhibits no edema.   Neurological: She is alert.   Skin: Skin is warm and dry.   Psychiatric: Her behavior is normal. Her mood appears anxious.   Nursing note and vitals reviewed.      Assessment/Plan   Marizol was seen today for anxiety, hypertension, hyperlipidemia and ulcerative colitis.    Diagnoses and all orders for this visit:    Benign essential " hypertension  -     Comprehensive Metabolic Panel    Dyslipidemia  -     Comprehensive Metabolic Panel  -     Lipid Panel    Ulcerative rectosigmoiditis without complication (CMS/HCC)    Anxiety    Anemia, unspecified type  -     Vitamin B12  -     CBC & Differential    Drug therapy  -     CBC & Differential    Other fatigue  -     TSH  -     Vitamin B12  -     CBC & Differential    -hypertension - controlled, continue medications  -HLD - continue statin, recheck lipids.  -UC stable on humira now;  D/w risks during pandemic, she is taking precautions  -anxiety and stress - pandemic has been hard.   sounds per hx depressed and drinking when off work ignoring her.  We discussed ways to reingage with  or at least try (suggested the love dare and 5 love languages).  -due for labs today  -hx of anemia - due for recheck         -Follow up: 6 months and prn

## 2020-05-22 LAB
ALBUMIN SERPL-MCNC: 4.2 G/DL (ref 3.5–5.2)
ALBUMIN/GLOB SERPL: 1.3 G/DL
ALP SERPL-CCNC: 97 U/L (ref 39–117)
ALT SERPL-CCNC: 28 U/L (ref 1–33)
AST SERPL-CCNC: 29 U/L (ref 1–32)
BASOPHILS # BLD AUTO: 0.04 10*3/MM3 (ref 0–0.2)
BASOPHILS NFR BLD AUTO: 0.5 % (ref 0–1.5)
BILIRUB SERPL-MCNC: <0.2 MG/DL (ref 0.2–1.2)
BUN SERPL-MCNC: 18 MG/DL (ref 8–23)
BUN/CREAT SERPL: 28.6 (ref 7–25)
CALCIUM SERPL-MCNC: 9.5 MG/DL (ref 8.6–10.5)
CHLORIDE SERPL-SCNC: 101 MMOL/L (ref 98–107)
CHOLEST SERPL-MCNC: 241 MG/DL (ref 0–200)
CO2 SERPL-SCNC: 29.7 MMOL/L (ref 22–29)
CREAT SERPL-MCNC: 0.63 MG/DL (ref 0.57–1)
EOSINOPHIL # BLD AUTO: 0.24 10*3/MM3 (ref 0–0.4)
EOSINOPHIL NFR BLD AUTO: 3.2 % (ref 0.3–6.2)
ERYTHROCYTE [DISTWIDTH] IN BLOOD BY AUTOMATED COUNT: 12.2 % (ref 12.3–15.4)
GLOBULIN SER CALC-MCNC: 3.2 GM/DL
GLUCOSE SERPL-MCNC: 81 MG/DL (ref 65–99)
HCT VFR BLD AUTO: 37.5 % (ref 34–46.6)
HDLC SERPL-MCNC: 41 MG/DL (ref 40–60)
HGB BLD-MCNC: 12.6 G/DL (ref 12–15.9)
IMM GRANULOCYTES # BLD AUTO: 0.01 10*3/MM3 (ref 0–0.05)
IMM GRANULOCYTES NFR BLD AUTO: 0.1 % (ref 0–0.5)
LDLC SERPL CALC-MCNC: ABNORMAL MG/DL
LYMPHOCYTES # BLD AUTO: 3.39 10*3/MM3 (ref 0.7–3.1)
LYMPHOCYTES NFR BLD AUTO: 44.8 % (ref 19.6–45.3)
MCH RBC QN AUTO: 29 PG (ref 26.6–33)
MCHC RBC AUTO-ENTMCNC: 33.6 G/DL (ref 31.5–35.7)
MCV RBC AUTO: 86.4 FL (ref 79–97)
MONOCYTES # BLD AUTO: 0.78 10*3/MM3 (ref 0.1–0.9)
MONOCYTES NFR BLD AUTO: 10.3 % (ref 5–12)
NEUTROPHILS # BLD AUTO: 3.11 10*3/MM3 (ref 1.7–7)
NEUTROPHILS NFR BLD AUTO: 41.1 % (ref 42.7–76)
NRBC BLD AUTO-RTO: 0 /100 WBC (ref 0–0.2)
PLATELET # BLD AUTO: 305 10*3/MM3 (ref 140–450)
POTASSIUM SERPL-SCNC: 4.3 MMOL/L (ref 3.5–5.2)
PROT SERPL-MCNC: 7.4 G/DL (ref 6–8.5)
RBC # BLD AUTO: 4.34 10*6/MM3 (ref 3.77–5.28)
SODIUM SERPL-SCNC: 140 MMOL/L (ref 136–145)
TRIGL SERPL-MCNC: 454 MG/DL (ref 0–150)
TSH SERPL DL<=0.005 MIU/L-ACNC: 2.93 UIU/ML (ref 0.27–4.2)
VIT B12 SERPL-MCNC: 692 PG/ML (ref 211–946)
VLDLC SERPL CALC-MCNC: ABNORMAL MG/DL
WBC # BLD AUTO: 7.57 10*3/MM3 (ref 3.4–10.8)

## 2020-05-27 ENCOUNTER — TELEPHONE (OUTPATIENT)
Dept: FAMILY MEDICINE CLINIC | Facility: CLINIC | Age: 60
End: 2020-05-27

## 2020-05-27 RX ORDER — VENLAFAXINE HYDROCHLORIDE 75 MG/1
75 CAPSULE, EXTENDED RELEASE ORAL DAILY
Qty: 30 CAPSULE | Refills: 5 | Status: SHIPPED | OUTPATIENT
Start: 2020-05-27 | End: 2020-08-17 | Stop reason: SDUPTHER

## 2020-05-27 NOTE — TELEPHONE ENCOUNTER
I left detailed message and sent MyChart message asking for pt to respond and let us know what she wants to do.

## 2020-05-27 NOTE — TELEPHONE ENCOUNTER
Patient calling and states she is having profuse sweating. States she sweat thru two shirts and a couple pillow cases.     Please call and advise at [phone]. Please call before 2pm or after 3pm.

## 2020-05-27 NOTE — TELEPHONE ENCOUNTER
We could try going up on effexor from 150mg po daily to 225mg daily to see if helps more with this.

## 2020-06-04 DIAGNOSIS — E78.5 DYSLIPIDEMIA: Primary | ICD-10-CM

## 2020-06-04 RX ORDER — ROSUVASTATIN CALCIUM 20 MG/1
20 TABLET, COATED ORAL NIGHTLY
Qty: 90 TABLET | Refills: 3 | Status: SHIPPED | OUTPATIENT
Start: 2020-06-04 | End: 2020-12-07 | Stop reason: SDUPTHER

## 2020-07-27 RX ORDER — LISINOPRIL 20 MG/1
TABLET ORAL
Qty: 90 TABLET | Refills: 1 | Status: SHIPPED | OUTPATIENT
Start: 2020-07-27 | End: 2021-01-14

## 2020-08-17 ENCOUNTER — TELEPHONE (OUTPATIENT)
Dept: FAMILY MEDICINE CLINIC | Facility: CLINIC | Age: 60
End: 2020-08-17

## 2020-08-17 DIAGNOSIS — F41.9 ANXIETY: ICD-10-CM

## 2020-08-17 RX ORDER — VENLAFAXINE HYDROCHLORIDE 150 MG/1
150 TABLET, EXTENDED RELEASE ORAL DAILY
Qty: 90 TABLET | Refills: 1 | Status: SHIPPED | OUTPATIENT
Start: 2020-08-17 | End: 2021-02-25

## 2020-08-17 RX ORDER — VENLAFAXINE HYDROCHLORIDE 75 MG/1
75 CAPSULE, EXTENDED RELEASE ORAL DAILY
Qty: 90 CAPSULE | Refills: 1 | Status: SHIPPED | OUTPATIENT
Start: 2020-08-17 | End: 2021-03-04

## 2020-08-24 RX ORDER — OMEPRAZOLE 40 MG/1
CAPSULE, DELAYED RELEASE ORAL
Qty: 180 CAPSULE | Refills: 1 | Status: SHIPPED | OUTPATIENT
Start: 2020-08-24 | End: 2021-02-25

## 2020-09-02 ENCOUNTER — TELEPHONE (OUTPATIENT)
Dept: FAMILY MEDICINE CLINIC | Facility: CLINIC | Age: 60
End: 2020-09-02

## 2020-09-02 DIAGNOSIS — J06.0 SORE THROAT AND LARYNGITIS: ICD-10-CM

## 2020-09-02 RX ORDER — CETIRIZINE HYDROCHLORIDE 10 MG/1
10 TABLET ORAL DAILY
Qty: 30 TABLET | Refills: 5 | Status: SHIPPED | OUTPATIENT
Start: 2020-09-02 | End: 2021-07-13

## 2020-09-02 RX ORDER — FLUTICASONE PROPIONATE 50 MCG
2 SPRAY, SUSPENSION (ML) NASAL DAILY
Qty: 16 G | Refills: 5 | Status: SHIPPED | OUTPATIENT
Start: 2020-09-02

## 2020-09-02 NOTE — TELEPHONE ENCOUNTER
I sent in allergy pill and refilled nasal spray;  If this is chronic past few months, go ahead and take.  If new sore throat, congestion and any other symptoms of covid 19, she should get checked.  RRJ

## 2020-10-26 ENCOUNTER — TELEPHONE (OUTPATIENT)
Dept: FAMILY MEDICINE CLINIC | Facility: CLINIC | Age: 60
End: 2020-10-26

## 2020-10-26 NOTE — TELEPHONE ENCOUNTER
She is high risk and should go to be evaluated at the  and tested.   Some locations can go:    [facility] [place]  [address] away   [address]  [address]  [place], [place] [address] [phone]  Hours  Monday-Friday  8:00 am-8:00 pm Saturday-Sunday   9:00 am-5:00 pm   [facility] [address]  [address]  [address], [place] [address] [phone]  Hours  Monday-Friday  8:00 am-8:00 pm Saturday-Sunday   9:00 am-5:00 pm     [facility] [place]  [address] away  [address]  [address]  [place], [place] [address] [phone]  Hours  Monday-Friday  9:00 am-7:00 pm Saturday-Sunday   9:00 am-4:00 pm   [facility]  [address] away  [address], [place] [address] [phone]

## 2020-10-26 NOTE — TELEPHONE ENCOUNTER
PATIENT CALLED AND STATED SHE HAS TO BABYSIT HER GRANDSON FOR THE NEXT WEEK AND THAT HE WAS POSSIBLY EXPOSED TO COVID-19. SHE STATED SHE IS CURRENTLY RUNNING A FEVER .4. SHE WOULD LIKE A CALL BACK REGARDING THIS AND WHAT THE BEST THING TO DO WOULD BE FROM HERE.     PLEASE ADVISE: [phone]

## 2020-10-30 ENCOUNTER — TELEPHONE (OUTPATIENT)
Dept: FAMILY MEDICINE CLINIC | Facility: CLINIC | Age: 60
End: 2020-10-30

## 2020-10-30 NOTE — TELEPHONE ENCOUNTER
Component Name  10/27/2020     Nasopharyngeal   Not Detected   OUTPATIENT ...     It is negative.  Dr. RODRIGUEZ    Please advise.

## 2020-10-30 NOTE — TELEPHONE ENCOUNTER
Patient said Wilmar is going to be sending us results from her COVID-19 test and wants to know her results when we get it. Appears may have result in  [facility]. Would like callback at [phone]

## 2020-11-23 ENCOUNTER — OFFICE VISIT (OUTPATIENT)
Dept: FAMILY MEDICINE CLINIC | Facility: CLINIC | Age: 60
End: 2020-11-23

## 2020-11-23 VITALS
HEIGHT: 60 IN | WEIGHT: 143 LBS | OXYGEN SATURATION: 97 % | HEART RATE: 103 BPM | SYSTOLIC BLOOD PRESSURE: 112 MMHG | DIASTOLIC BLOOD PRESSURE: 66 MMHG | BODY MASS INDEX: 28.07 KG/M2

## 2020-11-23 DIAGNOSIS — H93.13 TINNITUS OF BOTH EARS: ICD-10-CM

## 2020-11-23 DIAGNOSIS — K51.30 ULCERATIVE RECTOSIGMOIDITIS WITHOUT COMPLICATION (HCC): ICD-10-CM

## 2020-11-23 DIAGNOSIS — I10 BENIGN ESSENTIAL HYPERTENSION: ICD-10-CM

## 2020-11-23 DIAGNOSIS — E78.5 DYSLIPIDEMIA: ICD-10-CM

## 2020-11-23 DIAGNOSIS — B07.0 PLANTAR WARTS: ICD-10-CM

## 2020-11-23 DIAGNOSIS — Z12.31 ENCOUNTER FOR SCREENING MAMMOGRAM FOR MALIGNANT NEOPLASM OF BREAST: ICD-10-CM

## 2020-11-23 DIAGNOSIS — Z00.00 MEDICARE ANNUAL WELLNESS VISIT, SUBSEQUENT: Primary | ICD-10-CM

## 2020-11-23 DIAGNOSIS — Z23 FLU VACCINE NEED: ICD-10-CM

## 2020-11-23 PROCEDURE — G0439 PPPS, SUBSEQ VISIT: HCPCS | Performed by: FAMILY MEDICINE

## 2020-11-23 PROCEDURE — 99214 OFFICE O/P EST MOD 30 MIN: CPT | Performed by: FAMILY MEDICINE

## 2020-11-23 PROCEDURE — 90686 IIV4 VACC NO PRSV 0.5 ML IM: CPT | Performed by: FAMILY MEDICINE

## 2020-11-23 PROCEDURE — G0008 ADMIN INFLUENZA VIRUS VAC: HCPCS | Performed by: FAMILY MEDICINE

## 2020-11-23 NOTE — PATIENT INSTRUCTIONS
Advance Directive    Advance directives are legal documents that let you make choices ahead of time about your health care and medical treatment in case you become unable to communicate for yourself. Advance directives are a way for you to make known your wishes to family, friends, and health care providers. This can let others know about your end-of-life care if you become unable to communicate.  Discussing and writing advance directives should happen over time rather than all at once. Advance directives can be changed depending on your situation and what you want, even after you have signed the advance directives.  There are different types of advance directives, such as:  · Medical power of .  · Living will.  · Do not resuscitate (DNR) or do not attempt resuscitation (DNAR) order.  Health care proxy and medical power of   A health care proxy is also called a health care agent. This is a person who is appointed to make medical decisions for you in cases where you are unable to make the decisions yourself. Generally, people choose someone they know well and trust to represent their preferences. Make sure to ask this person for an agreement to act as your proxy. A proxy may have to exercise judgment in the event of a medical decision for which your wishes are not known.  A medical power of  is a legal document that names your health care proxy. Depending on the laws in your state, after the document is written, it may also need to be:  · Signed.  · Notarized.  · Dated.  · Copied.  · Witnessed.  · Incorporated into your medical record.  You may also want to appoint someone to manage your money in a situation in which you are unable to do so. This is called a durable power of  for finances. It is a separate legal document from the durable power of  for health care. You may choose the same person or someone different from your health care proxy to act as your agent in money  matters.  If you do not appoint a proxy, or if there is a concern that the proxy is not acting in your best interests, a court may appoint a guardian to act on your behalf.  Living will  A living will is a set of instructions that state your wishes about medical care when you cannot express them yourself. Health care providers should keep a copy of your living will in your medical record. You may want to give a copy to family members or friends. To alert caregivers in case of an emergency, you can place a card in your wallet to let them know that you have a living will and where they can find it. A living will is used if you become:  · Terminally ill.  · Disabled.  · Unable to communicate or make decisions.  Items to consider in your living will include:  · To use or not to use life-support equipment, such as dialysis machines and breathing machines (ventilators).  · A DNR or DNAR order. This tells health care providers not to use cardiopulmonary resuscitation (CPR) if breathing or heartbeat stops.  · To use or not to use tube feeding.  · To be given or not to be given food and fluids.  · Comfort (palliative) care when the goal becomes comfort rather than a cure.  · Donation of organs and tissues.  A living will does not give instructions for distributing your money and property if you should pass away.  DNR or DNAR  A DNR or DNAR order is a request not to have CPR in the event that your heart stops beating or you stop breathing. If a DNR or DNAR order has not been made and shared, a health care provider will try to help any patient whose heart has stopped or who has stopped breathing. If you plan to have surgery, talk with your health care provider about how your DNR or DNAR order will be followed if problems occur.  What if I do not have an advance directive?  If you do not have an advance directive, some states assign family decision makers to act on your behalf based on how closely you are related to them. Each  state has its own laws about advance directives. You may want to check with your health care provider, , or state representative about the laws in your state.  Summary  · Advance directives are the legal documents that allow you to make choices ahead of time about your health care and medical treatment in case you become unable to tell others about your care.  · The process of discussing and writing advance directives should happen over time. You can change the advance directives, even after you have signed them.  · Advance directives include DNR or DNAR orders, living esqueda, and designating an agent as your medical power of .  This information is not intended to replace advice given to you by your health care provider. Make sure you discuss any questions you have with your health care provider.  Document Released: 03/26/2009 Document Revised: 07/16/2020 Document Reviewed: 07/16/2020  [employer] Patient Education © 2020 [employer]      Calorie Counting for Weight Loss  Calories are units of energy. Your body needs a certain amount of calories from food to keep you going throughout the day. When you eat more calories than your body needs, your body stores the extra calories as fat. When you eat fewer calories than your body needs, your body burns fat to get the energy it needs.  Calorie counting means keeping track of how many calories you eat and drink each day. Calorie counting can be helpful if you need to lose weight. If you make sure to eat fewer calories than your body needs, you should lose weight. Ask your health care provider what a healthy weight is for you.  For calorie counting to work, you will need to eat the right number of calories in a day in order to lose a healthy amount of weight per week. A dietitian can help you determine how many calories you need in a day and will give you suggestions on how to reach your calorie goal.  · A healthy amount of weight to lose per week is usually  1-2 lb (0.5-0.9 kg). This usually means that your daily calorie intake should be reduced by 500-750 calories.  · Eating 1,200 - 1,500 calories per day can help most women lose weight.  · Eating 1,500 - 1,800 calories per day can help most men lose weight.  What is my plan?  My goal is to have __________ calories per day.  If I have this many calories per day, I should lose around __________ pounds per week.  What do I need to know about calorie counting?  In order to meet your daily calorie goal, you will need to:  · Find out how many calories are in each food you would like to eat. Try to do this before you eat.  · Decide how much of the food you plan to eat.  · Write down what you ate and how many calories it had. Doing this is called keeping a food log.  To successfully lose weight, it is important to balance calorie counting with a healthy lifestyle that includes regular activity. Aim for 150 minutes of moderate exercise (such as walking) or 75 minutes of vigorous exercise (such as running) each week.  Where do I find calorie information?    The number of calories in a food can be found on a Nutrition Facts label. If a food does not have a Nutrition Facts label, try to look up the calories online or ask your dietitian for help.  Remember that calories are listed per serving. If you choose to have more than one serving of a food, you will have to multiply the calories per serving by the amount of servings you plan to eat. For example, the label on a package of bread might say that a serving size is 1 slice and that there are 90 calories in a serving. If you eat 1 slice, you will have eaten 90 calories. If you eat 2 slices, you will have eaten 180 calories.  How do I keep a food log?  Immediately after each meal, record the following information in your food log:  · What you ate. Don't forget to include toppings, sauces, and other extras on the food.  · How much you ate. This can be measured in cups, ounces, or  "number of items.  · How many calories each food and drink had.  · The total number of calories in the meal.  Keep your food log near you, such as in a small notebook in your pocket, or use a mobile audelia or website. Some programs will calculate calories for you and show you how many calories you have left for the day to meet your goal.  What are some calorie counting tips?    · Use your calories on foods and drinks that will fill you up and not leave you hungry:  ? Some examples of foods that fill you up are nuts and nut butters, vegetables, lean proteins, and high-fiber foods like whole grains. High-fiber foods are foods with more than 5 g fiber per serving.  ? Drinks such as sodas, specialty coffee drinks, alcohol, and juices have a lot of calories, yet do not fill you up.  · Eat nutritious foods and avoid empty calories. Empty calories are calories you get from foods or beverages that do not have many vitamins or protein, such as candy, sweets, and soda. It is better to have a nutritious high-calorie food (such as an avocado) than a food with few nutrients (such as a bag of chips).  · Know how many calories are in the foods you eat most often. This will help you calculate calorie counts faster.  · Pay attention to calories in drinks. Low-calorie drinks include water and unsweetened drinks.  · Pay attention to nutrition labels for \"low fat\" or \"fat free\" foods. These foods sometimes have the same amount of calories or more calories than the full fat versions. They also often have added sugar, starch, or salt, to make up for flavor that was removed with the fat.  · Find a way of tracking calories that works for you. Get creative. Try different apps or programs if writing down calories does not work for you.  What are some portion control tips?  · Know how many calories are in a serving. This will help you know how many servings of a certain food you can have.  · Use a measuring cup to measure serving sizes. You could " also try weighing out portions on a kitchen scale. With time, you will be able to estimate serving sizes for some foods.  · Take some time to put servings of different foods on your favorite plates, bowls, and cups so you know what a serving looks like.  · Try not to eat straight from a bag or box. Doing this can lead to overeating. Put the amount you would like to eat in a cup or on a plate to make sure you are eating the right portion.  · Use smaller plates, glasses, and bowls to prevent overeating.  · Try not to multitask (for example, watch TV or use your computer) while eating. If it is time to eat, sit down at a table and enjoy your food. This will help you to know when you are full. It will also help you to be aware of what you are eating and how much you are eating.  What are tips for following this plan?  Reading food labels  · Check the calorie count compared to the serving size. The serving size may be smaller than what you are used to eating.  · Check the source of the calories. Make sure the food you are eating is high in vitamins and protein and low in saturated and trans fats.  Shopping  · Read nutrition labels while you shop. This will help you make healthy decisions before you decide to purchase your food.  · Make a grocery list and stick to it.  Cooking  · Try to cook your favorite foods in a healthier way. For example, try baking instead of frying.  · Use low-fat dairy products.  Meal planning  · Use more fruits and vegetables. Half of your plate should be fruits and vegetables.  · Include lean proteins like poultry and fish.  How do I count calories when eating out?  · Ask for smaller portion sizes.  · Consider sharing an entree and sides instead of getting your own entree.  · If you get your own entree, eat only half. Ask for a box at the beginning of your meal and put the rest of your entree in it so you are not tempted to eat it.  · If calories are listed on the menu, choose the lower calorie  "options.  · Choose dishes that include vegetables, fruits, whole grains, low-fat dairy products, and lean protein.  · Choose items that are boiled, broiled, grilled, or steamed. Stay away from items that are buttered, battered, fried, or served with cream sauce. Items labeled \"crispy\" are usually fried, unless stated otherwise.  · Choose water, low-fat milk, unsweetened iced tea, or other drinks without added sugar. If you want an alcoholic beverage, choose a lower calorie option such as a glass of wine or light beer.  · Ask for dressings, sauces, and syrups on the side. These are usually high in calories, so you should limit the amount you eat.  · If you want a salad, choose a garden salad and ask for grilled meats. Avoid extra toppings like zacarias, cheese, or fried items. Ask for the dressing on the side, or ask for olive oil and vinegar or lemon to use as dressing.  · Estimate how many servings of a food you are given. For example, a serving of cooked rice is ½ cup or about the size of half a baseball. Knowing serving sizes will help you be aware of how much food you are eating at restaurants. The list below tells you how big or small some common portion sizes are based on everyday objects:  ? 1 oz--4 stacked dice.  ? 3 oz--1 deck of cards.  ? 1 tsp--1 die.  ? 1 Tbsp--½ a ping-pong ball.  ? 2 Tbsp--1 ping-pong ball.  ? ½ cup--½ baseball.  ? 1 cup--1 baseball.  Summary  · Calorie counting means keeping track of how many calories you eat and drink each day. If you eat fewer calories than your body needs, you should lose weight.  · A healthy amount of weight to lose per week is usually 1-2 lb (0.5-0.9 kg). This usually means reducing your daily calorie intake by 500-750 calories.  · The number of calories in a food can be found on a Nutrition Facts label. If a food does not have a Nutrition Facts label, try to look up the calories online or ask your dietitian for help.  · Use your calories on foods and drinks that " will fill you up, and not on foods and drinks that will leave you hungry.  · Use smaller plates, glasses, and bowls to prevent overeating.  This information is not intended to replace advice given to you by your health care provider. Make sure you discuss any questions you have with your health care provider.  Document Released: 12/18/2006 Document Revised: 09/06/2019 Document Reviewed: 11/17/2017  [employer] Patient Education © 2020 [employer]      Exercising to Lose Weight  Exercise is structured, repetitive physical activity to improve fitness and health. Getting regular exercise is important for everyone. It is especially important if you are overweight. Being overweight increases your risk of heart disease, stroke, diabetes, high blood pressure, and several types of cancer. Reducing your calorie intake and exercising can help you lose weight.  Exercise is usually categorized as moderate or vigorous intensity. To lose weight, most people need to do a certain amount of moderate-intensity or vigorous-intensity exercise each week.  Moderate-intensity exercise    Moderate-intensity exercise is any activity that gets you moving enough to burn at least three times more energy (calories) than if you were sitting.  Examples of moderate exercise include:  · Walking a mile in 15 minutes.  · Doing light yard work.  · Biking at an easy pace.  Most people should get at least 150 minutes (2 hours and 30 minutes) a week of moderate-intensity exercise to maintain their body weight.  Vigorous-intensity exercise  Vigorous-intensity exercise is any activity that gets you moving enough to burn at least six times more calories than if you were sitting. When you exercise at this intensity, you should be working hard enough that you are not able to carry on a conversation.  Examples of vigorous exercise include:  · Running.  · Playing a team sport, such as football, basketball, and soccer.  · Jumping rope.  Most people should get at  least 75 minutes (1 hour and 15 minutes) a week of vigorous-intensity exercise to maintain their body weight.  How can exercise affect me?  When you exercise enough to burn more calories than you eat, you lose weight. Exercise also reduces body fat and builds muscle. The more muscle you have, the more calories you burn. Exercise also:  · Improves mood.  · Reduces stress and tension.  · Improves your overall fitness, flexibility, and endurance.  · Increases bone strength.  The amount of exercise you need to lose weight depends on:  · Your age.  · The type of exercise.  · Any health conditions you have.  · Your overall physical ability.  Talk to your health care provider about how much exercise you need and what types of activities are safe for you.  What actions can I take to lose weight?  Nutrition    · Make changes to your diet as told by your health care provider or diet and nutrition specialist (dietitian). This may include:  ? Eating fewer calories.  ? Eating more protein.  ? Eating less unhealthy fats.  ? Eating a diet that includes fresh fruits and vegetables, whole grains, low-fat dairy products, and lean protein.  ? Avoiding foods with added fat, salt, and sugar.  · Drink plenty of water while you exercise to prevent dehydration or heat stroke.  Activity  · Choose an activity that you enjoy and set realistic goals. Your health care provider can help you make an exercise plan that works for you.  · Exercise at a moderate or vigorous intensity most days of the week.  ? The intensity of exercise may vary from person to person. You can tell how intense a workout is for you by paying attention to your breathing and heartbeat. Most people will notice their breathing and heartbeat get faster with more intense exercise.  · Do resistance training twice each week, such as:  ? Push-ups.  ? Sit-ups.  ? Lifting weights.  ? Using resistance bands.  · Getting short amounts of exercise can be just as helpful as long  structured periods of exercise. If you have trouble finding time to exercise, try to include exercise in your daily routine.  ? Get up, stretch, and walk around every 30 minutes throughout the day.  ? Go for a walk during your lunch break.  ? Park your car farther away from your destination.  ? If you take public transportation, get off one stop early and walk the rest of the way.  ? Make phone calls while standing up and walking around.  ? Take the stairs instead of elevators or escalators.  · Wear comfortable clothes and shoes with good support.  · Do not exercise so much that you hurt yourself, feel dizzy, or get very short of breath.  Where to find more information  · [place] Department of Health and Human Services: www.hhs.gov  · Centers for Disease Control and Prevention (CDC): www.cdc.gov  Contact a health care provider:  · Before starting a new exercise program.  · If you have questions or concerns about your weight.  · If you have a medical problem that keeps you from exercising.  Get help right away if you have any of the following while exercising:  · Injury.  · Dizziness.  · Difficulty breathing or shortness of breath that does not go away when you stop exercising.  · Chest pain.  · Rapid heartbeat.  Summary  · Being overweight increases your risk of heart disease, stroke, diabetes, high blood pressure, and several types of cancer.  · Losing weight happens when you burn more calories than you eat.  · Reducing the amount of calories you eat in addition to getting regular moderate or vigorous exercise each week helps you lose weight.  This information is not intended to replace advice given to you by your health care provider. Make sure you discuss any questions you have with your health care provider.  Document Released: 01/20/2012 Document Revised: 12/31/2018 Document Reviewed: 12/31/2018  [employer] Patient Education © 2020 [employer]

## 2020-11-23 NOTE — PROGRESS NOTES
QUICK REFERENCE INFORMATION:  The ABCs of the Annual Wellness Visit    Subsequent Medicare Wellness Visit    HEALTH RISK ASSESSMENT    1960    Recent Hospitalizations:  No hospitalization(s) within the last year..        Current Medical Providers:  Patient Care Team:  Ras Mcconnell DO as PCP - General (Family Medicine)        Smoking Status:  Social History     Tobacco Use   Smoking Status Never Smoker   Smokeless Tobacco Never Used       Alcohol Consumption:  Social History     Substance and Sexual Activity   Alcohol Use No       Depression Screen:   PHQ-2/PHQ-9 Depression Screening 11/23/2020   Little interest or pleasure in doing things 0   Feeling down, depressed, or hopeless 0   Trouble falling or staying asleep, or sleeping too much 0   Feeling tired or having little energy 0   Poor appetite or overeating 0   Feeling bad about yourself - or that you are a failure or have let yourself or your family down 0   Trouble concentrating on things, such as reading the newspaper or watching television 0   Moving or speaking so slowly that other people could have noticed. Or the opposite - being so fidgety or restless that you have been moving around a lot more than usual 0   Thoughts that you would be better off dead, or of hurting yourself in some way 0   Total Score 0   If you checked off any problems, how difficult have these problems made it for you to do your work, take care of things at home, or get along with other people? Not difficult at all       Health Habits and Functional and Cognitive Screening:  Functional & Cognitive Status 11/23/2020   Do you have difficulty preparing food and eating? No   Do you have difficulty bathing yourself, getting dressed or grooming yourself? No   Do you have difficulty using the toilet? No   Do you have difficulty moving around from place to place? No   Do you have trouble with steps or getting out of a bed or a chair? No   Current Diet Well Balanced Diet   Dental  Exam Not up to date   Eye Exam Up to date   Exercise (times per week) 3 times per week   Current Exercises Include Walking   Current Exercise Activities Include -   Do you need help using the phone?  No   Are you deaf or do you have serious difficulty hearing?  No   Do you need help with transportation? No   Do you need help shopping? No   Do you need help preparing meals?  No   Do you need help with housework?  No   Do you need help with laundry? No   Do you need help taking your medications? No   Do you need help managing money? No   Do you ever drive or ride in a car without wearing a seat belt? No   Have you felt unusual stress, anger or loneliness in the last month? No   Who do you live with? Spouse   If you need help, do you have trouble finding someone available to you? No   Have you been bothered in the last four weeks by sexual problems? No   Do you have difficulty concentrating, remembering or making decisions? No           Does the patient have evidence of cognitive impairment? No    Aspirin use counseling: Does not need ASA (and currently is not on it)      Recent Lab Results:  CMP:  Lab Results   Component Value Date    GLU 81 05/21/2020    BUN 18 05/21/2020    CREATININE 0.63 05/21/2020    EGFRIFNONA 96 05/21/2020    EGFRIFAFRI 117 05/21/2020    BCR 28.6 (H) 05/21/2020     05/21/2020    K 4.3 05/21/2020    CO2 29.7 (H) 05/21/2020    CALCIUM 9.5 05/21/2020    PROTENTOTREF 7.4 05/21/2020    ALBUMIN 4.20 05/21/2020    LABGLOBREF 3.2 05/21/2020    LABIL2 1.3 05/21/2020    BILITOT <0.2 (L) 05/21/2020    ALKPHOS 97 05/21/2020    AST 29 05/21/2020    ALT 28 05/21/2020     Lipid Panel:  Lab Results   Component Value Date    TRIG 454 (H) 05/21/2020    HDL 41 05/21/2020    VLDL CANCELED 05/21/2020     HbA1c:       Visual Acuity:  No exam data present    Age-appropriate Screening Schedule:  Refer to the list below for future screening recommendations based on patient's age, sex and/or medical conditions.  Orders for these recommended tests are listed in the plan section. The patient has been provided with a written plan.    Health Maintenance   Topic Date Due   • PAP SMEAR  05/21/2018   • MAMMOGRAM  01/02/2020   • LIPID PANEL  05/21/2021   • TDAP/TD VACCINES (2 - Td) 08/16/2026   • COLONOSCOPY  12/28/2026   • INFLUENZA VACCINE  Completed        Subjective   History of Present Illness    Marizol Park is a 60 y.o. female who presents for an Subsequent Wellness Visit.    The following portions of the patient's history were reviewed and updated as appropriate: allergies, current medications, past family history, past medical history, past social history, past surgical history and problem list.    Outpatient Medications Prior to Visit   Medication Sig Dispense Refill   • adalimumab (HUMIRA) 40 MG/0.8ML Prefilled Syringe Kit injection Inject 40 mg under the skin 1 (One) Time. Once every 2 weeks     • busPIRone (BUSPAR) 15 MG tablet Take 1 tablet by mouth Daily.     • cetirizine (zyrTEC) 10 MG tablet Take 1 tablet by mouth Daily. 30 tablet 5   • fluticasone (FLONASE) 50 MCG/ACT nasal spray 2 sprays into the nostril(s) as directed by provider Daily. 16 g 5   • ketorolac (ACULAR) 0.5 % ophthalmic solution PLACE 1 DROP IN OS TID FOR 3 DAYS THEN STOP  0   • lisinopril (PRINIVIL,ZESTRIL) 20 MG tablet TAKE 1 TABLET DAILY FOR    BLOOD PRESSURE 90 tablet 1   • omeprazole (priLOSEC) 40 MG capsule TAKE 1 CAPSULE TWICE DAILY 180 capsule 1   • Pediatric Multivitamins-Iron (FLINTSTONES PLUS IRON PO) Take 2 tablets by mouth Daily.     • rosuvastatin (Crestor) 20 MG tablet Take 1 tablet by mouth Every Night. 90 tablet 3   • traZODone (DESYREL) 50 MG tablet Take 1 tablet by mouth Every Night. 30 tablet 1   • venlafaxine (EFFEXOR) 150 MG tablet sustained-release 24 hour 24 hr tablet Take 1 tablet by mouth Daily. 90 tablet 1   • venlafaxine XR (Effexor XR) 75 MG 24 hr capsule Take 1 capsule by mouth Daily. Take with Effexor 150mg. 90 capsule  1     Facility-Administered Medications Prior to Visit   Medication Dose Route Frequency Provider Last Rate Last Admin   • lidocaine (XYLOCAINE) 2% injection 1 mL  1 mL Injection Once Jovita Auguste APRN       • triamcinolone acetonide (KENALOG-40) injection 40 mg  40 mg Intra-articular Once Jovita Auguste APRN           Patient Active Problem List   Diagnosis   • Acute post-traumatic stress disorder   • Anxiety   • Benign essential hypertension   • Excessive cerumen in ear canal   • Cough   • Dyslipidemia   • External hemorrhoids   • Gastroesophageal reflux disease   • Hemorrhoids   • Panic disorder without agoraphobia   • Peripheral neuropathy   • Ulcerative colitis (CMS/HCC)   • Ulnar neuropathy   • Immunocompromised state (CMS/HCC)   • High triglycerides   • Right anterior shoulder pain       Advance Care Planning:  ACP discussion was held with the patient during this visit. Patient does not have an advance directive, information provided.    Identification of Risk Factors:  Risk factors include: Obesity/Overweight .    Review of Systems   Constitutional: Negative for fever.   HENT: Positive for tinnitus. Negative for sore throat.    Respiratory: Negative for shortness of breath.    Cardiovascular: Negative for chest pain, palpitations, orthopnea and PND.   Neurological: Negative for tingling and numbness.   Psychiatric/Behavioral: The patient is nervous/anxious.        Compared to one year ago, the patient feels her physical health is the same.  Compared to one year ago, the patient feels her mental health is the same.    Objective     Physical Exam  Vitals signs and nursing note reviewed.   Constitutional:       Appearance: She is well-developed.   HENT:      Head: Normocephalic and atraumatic.      Right Ear: Tympanic membrane, ear canal and external ear normal.      Left Ear: Tympanic membrane, ear canal and external ear normal.   Neck:      Musculoskeletal: Neck supple.      Thyroid: No thyromegaly.  "     Vascular: No JVD.   Cardiovascular:      Rate and Rhythm: Normal rate and regular rhythm.      Heart sounds: Normal heart sounds. No murmur. No friction rub. No gallop.    Pulmonary:      Effort: Pulmonary effort is normal. No respiratory distress.      Breath sounds: Normal breath sounds. No wheezing or rales.   Abdominal:      General: Bowel sounds are normal. There is no distension.      Palpations: Abdomen is soft.      Tenderness: There is no abdominal tenderness. There is no guarding or rebound.   Musculoskeletal:        Feet:    Skin:     General: Skin is warm and dry.   Neurological:      Mental Status: She is alert.   Psychiatric:         Behavior: Behavior normal.         Vitals:    11/23/20 1003   BP: 112/66   Pulse: 103   SpO2: 97%   Weight: 64.9 kg (143 lb)   Height: 152.4 cm (60\")   PainSc: 0-No pain       Patient's Body mass index is 27.93 kg/m². BMI is above normal parameters. Recommendations include: educational material.      Assessment/Plan   Patient Self-Management and Personalized Health Advice  The patient has been provided with information about: diet and exercise and preventive services including:   · Annual Wellness Visit (AWV).    Visit Diagnoses:    ICD-10-CM ICD-9-CM   1. Medicare annual wellness visit, subsequent  Z00.00 V70.0   2. Benign essential hypertension  I10 401.1   3. Dyslipidemia  E78.5 272.4   4. Ulcerative rectosigmoiditis without complication (CMS/HCC)  K51.30 556.3   5. Plantar warts  B07.0 078.12   6. Flu vaccine need  Z23 V04.81   7. Encounter for screening mammogram for malignant neoplasm of breast  Z12.31 V76.12   8. Tinnitus of both ears  H93.13 388.30       Orders Placed This Encounter   Procedures   • Mammo Screening Bilateral With CAD     Standing Status:   Future     Standing Expiration Date:   11/23/2021     Scheduling Instructions:      [facility] has done there     Order Specific Question:   Reason for Exam:     Answer:   breast cancer screening   • Fluarix Quad " >6 Months ([id number])   • Comprehensive Metabolic Panel   • Lipid Panel       Outpatient Encounter Medications as of 11/23/2020   Medication Sig Dispense Refill   • adalimumab (HUMIRA) 40 MG/0.8ML Prefilled Syringe Kit injection Inject 40 mg under the skin 1 (One) Time. Once every 2 weeks     • busPIRone (BUSPAR) 15 MG tablet Take 1 tablet by mouth Daily.     • cetirizine (zyrTEC) 10 MG tablet Take 1 tablet by mouth Daily. 30 tablet 5   • fluticasone (FLONASE) 50 MCG/ACT nasal spray 2 sprays into the nostril(s) as directed by provider Daily. 16 g 5   • ketorolac (ACULAR) 0.5 % ophthalmic solution PLACE 1 DROP IN OS TID FOR 3 DAYS THEN STOP  0   • lisinopril (PRINIVIL,ZESTRIL) 20 MG tablet TAKE 1 TABLET DAILY FOR    BLOOD PRESSURE 90 tablet 1   • omeprazole (priLOSEC) 40 MG capsule TAKE 1 CAPSULE TWICE DAILY 180 capsule 1   • Pediatric Multivitamins-Iron (FLINTSTONES PLUS IRON PO) Take 2 tablets by mouth Daily.     • rosuvastatin (Crestor) 20 MG tablet Take 1 tablet by mouth Every Night. 90 tablet 3   • traZODone (DESYREL) 50 MG tablet Take 1 tablet by mouth Every Night. 30 tablet 1   • venlafaxine (EFFEXOR) 150 MG tablet sustained-release 24 hour 24 hr tablet Take 1 tablet by mouth Daily. 90 tablet 1   • venlafaxine XR (Effexor XR) 75 MG 24 hr capsule Take 1 capsule by mouth Daily. Take with Effexor 150mg. 90 capsule 1   • salicylic acid 17 % gel Apply to foot daily then cover with bandaides. 14 g 5     Facility-Administered Encounter Medications as of 11/23/2020   Medication Dose Route Frequency Provider Last Rate Last Admin   • lidocaine (XYLOCAINE) 2% injection 1 mL  1 mL Injection Once Jovita Auguste APRN       • triamcinolone acetonide (KENALOG-40) injection 40 mg  40 mg Intra-articular Once Jovita Auguste APRN           Reviewed use of high risk medication in the elderly: yes  Reviewed for potential of harmful drug interactions in the elderly: yes    Follow Up:  Return in about 6 months (around  5/23/2021), or if symptoms worsen or fail to improve.     An After Visit Summary and PPPS with all of these plans were given to the patient.           ++++++++++++++++++++++++++++++++++++++++++++++++++++++++++++++++++     Chief Complaint   Patient presents with   • Annual Exam     medicare   • Hypertension   • Anxiety   • Hyperlipidemia   • Ulcerative Colitis   • Foot Pain     Hypertension  This is a chronic problem. The current episode started more than 1 year ago. The problem is unchanged. The problem is controlled. Associated symptoms include anxiety. Pertinent negatives include no chest pain, orthopnea, palpitations, peripheral edema, PND or shortness of breath. There are no associated agents to hypertension. Risk factors for coronary artery disease include dyslipidemia and post-menopausal state. The current treatment provides moderate improvement. There are no compliance problems.    Anxiety  Presents for follow-up visit. Symptoms include nervous/anxious behavior. Patient reports no chest pain, excessive worry, palpitations or shortness of breath. Symptoms occur occasionally. The severity of symptoms is mild. The quality of sleep is fair. Nighttime awakenings: occasional.     Compliance with medications is %.   Tinnitus  This is a chronic problem. The current episode started more than 1 month ago. The problem occurs constantly. The problem has been unchanged. Pertinent negatives include no chest pain, fever, numbness or sore throat. Nothing aggravates the symptoms. She has tried nothing for the symptoms.   Hyperlipidemia  This is a chronic problem. The current episode started more than 1 year ago. The problem is controlled. Recent lipid tests were reviewed and are normal. Associated symptoms include leg pain. Pertinent negatives include no chest pain or shortness of breath. Current antihyperlipidemic treatment includes statins. The current treatment provides moderate improvement of lipids. There are no  "compliance problems.  Risk factors for coronary artery disease include dyslipidemia, hypertension and post-menopausal.   Leg Pain   Incident onset: When 8 years of age cut foot, where scar huring on foot/toes;  would like checked.  no new injuries. The incident occurred at home. The pain is moderate. The pain has been fluctuating since onset. Pertinent negatives include no numbness or tingling. Nothing aggravates the symptoms. She has tried nothing for the symptoms. The treatment provided no relief.       Review of Systems   Constitution: Negative for fever.   HENT: Positive for tinnitus. Negative for sore throat.    Cardiovascular: Negative for chest pain, orthopnea, palpitations and paroxysmal nocturnal dyspnea.   Respiratory: Negative for shortness of breath.    Neurological: Negative for numbness and tingling.   Psychiatric/Behavioral: The patient is nervous/anxious.        Social History     Tobacco Use   • Smoking status: Never Smoker   • Smokeless tobacco: Never Used   Substance Use Topics   • Alcohol use: No     O:   Vitals:    11/23/20 1003   BP: 112/66   Pulse: 103   SpO2: 97%   Weight: 64.9 kg (143 lb)   Height: 152.4 cm (60\")   PainSc: 0-No pain     Body mass index is 27.93 kg/m².  Vitals signs and nursing note reviewed.   Constitutional:       Appearance: Well-developed.   HENT:      Head: Normocephalic and atraumatic.      Right Ear: Tympanic membrane, ear canal and external ear normal.      Left Ear: Tympanic membrane, ear canal and external ear normal.   Neck:      Musculoskeletal: Neck supple.      Thyroid: No thyromegaly.      Vascular: No JVD.   Pulmonary:      Effort: Pulmonary effort is normal. No respiratory distress.      Breath sounds: Normal breath sounds. No wheezing. No rales.   Cardiovascular:      Normal rate. Regular rhythm. Normal heart sounds.      No gallop. No friction rub.   Abdominal:      General: Bowel sounds are normal. There is no distension.      Palpations: Abdomen is soft.  "      Tenderness: There is no abdominal tenderness. There is no guarding or rebound.   Musculoskeletal:        Feet:    Skin:     General: Skin is warm and dry.   Neurological:      Mental Status: Alert.   Psychiatric:         Behavior: Behavior normal.         Diagnoses and all orders for this visit:    1. Medicare annual wellness visit, subsequent (Primary)    2. Benign essential hypertension  -     Comprehensive Metabolic Panel    3. Dyslipidemia  -     Comprehensive Metabolic Panel  -     Lipid Panel    4. Ulcerative rectosigmoiditis without complication (CMS/[facility])    5. Plantar warts  Comments:  Right foot, callous  Orders:  -     salicylic acid 17 % gel; Apply to foot daily then cover with bandaides.  Dispense: 14 g; Refill: 5    6. Flu vaccine need  -     Fluarix Quad >6 Months ([id number])    7. Encounter for screening mammogram for malignant neoplasm of breast  -     Mammo Screening Bilateral With CAD; Future    8. Tinnitus of both ears    -try white noise at night for tinnitus, let know if worse;  Denies any focla neuro deficits or other issues  -hypertension - controlled, continue medications  -plantar warts/callouses - try topical;  Consider podiatry referal  -HLD - continue statin, recheck lipids.  -uc - in remisison, continue humira per GI      Return in about 6 months (around 5/23/2021), or if symptoms worsen or fail to improve.

## 2020-11-24 LAB
ALBUMIN SERPL-MCNC: 4.3 G/DL (ref 3.5–5.2)
ALBUMIN/GLOB SERPL: 1.4 G/DL
ALP SERPL-CCNC: 99 U/L (ref 39–117)
ALT SERPL-CCNC: 37 U/L (ref 1–33)
AST SERPL-CCNC: 29 U/L (ref 1–32)
BILIRUB SERPL-MCNC: 0.3 MG/DL (ref 0–1.2)
BUN SERPL-MCNC: 18 MG/DL (ref 8–23)
BUN/CREAT SERPL: 28.6 (ref 7–25)
CALCIUM SERPL-MCNC: 9.6 MG/DL (ref 8.6–10.5)
CHLORIDE SERPL-SCNC: 97 MMOL/L (ref 98–107)
CHOLEST SERPL-MCNC: 395 MG/DL (ref 0–200)
CO2 SERPL-SCNC: 32.7 MMOL/L (ref 22–29)
CREAT SERPL-MCNC: 0.63 MG/DL (ref 0.57–1)
GLOBULIN SER CALC-MCNC: 3.1 GM/DL
GLUCOSE SERPL-MCNC: 85 MG/DL (ref 65–99)
HDLC SERPL-MCNC: 47 MG/DL (ref 40–60)
LDLC SERPL CALC-MCNC: 261 MG/DL (ref 0–100)
POTASSIUM SERPL-SCNC: 4.3 MMOL/L (ref 3.5–5.2)
PROT SERPL-MCNC: 7.4 G/DL (ref 6–8.5)
SODIUM SERPL-SCNC: 134 MMOL/L (ref 136–145)
TRIGL SERPL-MCNC: 381 MG/DL (ref 0–150)
VLDLC SERPL CALC-MCNC: 87 MG/DL (ref 5–40)

## 2020-12-07 DIAGNOSIS — E78.5 DYSLIPIDEMIA: ICD-10-CM

## 2020-12-07 RX ORDER — ROSUVASTATIN CALCIUM 20 MG/1
20 TABLET, COATED ORAL NIGHTLY
Qty: 90 TABLET | Refills: 3 | Status: SHIPPED | OUTPATIENT
Start: 2020-12-07 | End: 2021-08-15

## 2021-01-14 RX ORDER — LISINOPRIL 20 MG/1
TABLET ORAL
Qty: 90 TABLET | Refills: 1 | Status: SHIPPED | OUTPATIENT
Start: 2021-01-14 | End: 2021-07-09

## 2021-01-26 ENCOUNTER — TELEPHONE (OUTPATIENT)
Dept: FAMILY MEDICINE CLINIC | Facility: CLINIC | Age: 61
End: 2021-01-26

## 2021-01-26 NOTE — TELEPHONE ENCOUNTER
Caller: Marizol Park    Relationship to patient: Self    Best call back number: [phone]    Chief complaint: LABS    Type of visit: LAB APPOINTMENT    Requested date: AS SOON AS POSSIBLE        Additional notes:PATIENT CALLED IN AND STATED THAT DR ANN IS GOING TO SEND A REQUEST FOR LAB WORK TO THE OFFICE. SHE NEEDS TO SCHEDULE THAT APPOINTMENT AND HAVE THE RESULTS SENT BACK TO DR ANN.    PLEASE ADVISE

## 2021-01-26 NOTE — TELEPHONE ENCOUNTER
Pt is aware she doesn't need appt for labs but I have not seen lab orders from Dr. Demarco yet. Pt will call back tomorrow and speak with Xin in the lab if she has gotten lab orders.

## 2021-02-24 DIAGNOSIS — F41.9 ANXIETY: ICD-10-CM

## 2021-02-25 RX ORDER — OMEPRAZOLE 40 MG/1
CAPSULE, DELAYED RELEASE ORAL
Qty: 180 CAPSULE | Refills: 1 | Status: SHIPPED | OUTPATIENT
Start: 2021-02-25 | End: 2021-08-12

## 2021-02-25 RX ORDER — VENLAFAXINE HYDROCHLORIDE 150 MG/1
TABLET, EXTENDED RELEASE ORAL
Qty: 90 TABLET | Refills: 1 | Status: SHIPPED | OUTPATIENT
Start: 2021-02-25 | End: 2021-08-12

## 2021-03-03 DIAGNOSIS — F41.9 ANXIETY: ICD-10-CM

## 2021-03-04 RX ORDER — VENLAFAXINE HYDROCHLORIDE 75 MG/1
CAPSULE, EXTENDED RELEASE ORAL
Qty: 90 CAPSULE | Refills: 1 | Status: SHIPPED | OUTPATIENT
Start: 2021-03-04 | End: 2021-06-11 | Stop reason: SDUPTHER

## 2021-03-19 ENCOUNTER — BULK ORDERING (OUTPATIENT)
Dept: CASE MANAGEMENT | Facility: OTHER | Age: 61
End: 2021-03-19

## 2021-03-19 DIAGNOSIS — Z23 IMMUNIZATION DUE: ICD-10-CM

## 2021-05-07 ENCOUNTER — OFFICE (OUTPATIENT)
Dept: URBAN - METROPOLITAN AREA CLINIC 65 | Facility: CLINIC | Age: 61
End: 2021-05-07

## 2021-05-07 VITALS
DIASTOLIC BLOOD PRESSURE: 80 MMHG | HEIGHT: 60 IN | WEIGHT: 143 LBS | HEART RATE: 97 BPM | TEMPERATURE: 97.9 F | SYSTOLIC BLOOD PRESSURE: 126 MMHG

## 2021-05-07 DIAGNOSIS — K21.9 GASTRO-ESOPHAGEAL REFLUX DISEASE WITHOUT ESOPHAGITIS: ICD-10-CM

## 2021-05-07 DIAGNOSIS — K51.50 LEFT SIDED COLITIS WITHOUT COMPLICATIONS: ICD-10-CM

## 2021-05-07 DIAGNOSIS — A04.9 BACTERIAL INTESTINAL INFECTION, UNSPECIFIED: ICD-10-CM

## 2021-05-07 DIAGNOSIS — Z80.0 FAMILY HISTORY OF MALIGNANT NEOPLASM OF DIGESTIVE ORGANS: ICD-10-CM

## 2021-05-07 PROCEDURE — 99214 OFFICE O/P EST MOD 30 MIN: CPT | Performed by: INTERNAL MEDICINE

## 2021-05-24 ENCOUNTER — OFFICE VISIT (OUTPATIENT)
Dept: FAMILY MEDICINE CLINIC | Facility: CLINIC | Age: 61
End: 2021-05-24

## 2021-05-24 VITALS
WEIGHT: 144 LBS | DIASTOLIC BLOOD PRESSURE: 62 MMHG | OXYGEN SATURATION: 97 % | SYSTOLIC BLOOD PRESSURE: 112 MMHG | HEIGHT: 60 IN | HEART RATE: 93 BPM | BODY MASS INDEX: 28.27 KG/M2

## 2021-05-24 DIAGNOSIS — K51.30 ULCERATIVE RECTOSIGMOIDITIS WITHOUT COMPLICATION (HCC): ICD-10-CM

## 2021-05-24 DIAGNOSIS — E78.5 DYSLIPIDEMIA: ICD-10-CM

## 2021-05-24 DIAGNOSIS — I10 BENIGN ESSENTIAL HYPERTENSION: Primary | ICD-10-CM

## 2021-05-24 DIAGNOSIS — F41.1 GAD (GENERALIZED ANXIETY DISORDER): ICD-10-CM

## 2021-05-24 DIAGNOSIS — D84.9 IMMUNOCOMPROMISED STATE (HCC): ICD-10-CM

## 2021-05-24 LAB
ALBUMIN SERPL-MCNC: 4.3 G/DL (ref 3.5–5.2)
ALBUMIN/GLOB SERPL: 1.5 G/DL
ALP SERPL-CCNC: 100 U/L (ref 39–117)
ALT SERPL-CCNC: 36 U/L (ref 1–33)
AST SERPL-CCNC: 30 U/L (ref 1–32)
BILIRUB SERPL-MCNC: 0.3 MG/DL (ref 0–1.2)
BUN SERPL-MCNC: 18 MG/DL (ref 8–23)
BUN/CREAT SERPL: 26.9 (ref 7–25)
CALCIUM SERPL-MCNC: 9.8 MG/DL (ref 8.6–10.5)
CHLORIDE SERPL-SCNC: 103 MMOL/L (ref 98–107)
CHOLEST SERPL-MCNC: 265 MG/DL (ref 0–200)
CO2 SERPL-SCNC: 31.4 MMOL/L (ref 22–29)
CREAT SERPL-MCNC: 0.67 MG/DL (ref 0.57–1)
GLOBULIN SER CALC-MCNC: 2.9 GM/DL
GLUCOSE SERPL-MCNC: 82 MG/DL (ref 65–99)
HDLC SERPL-MCNC: 54 MG/DL (ref 40–60)
LDLC SERPL CALC-MCNC: 146 MG/DL (ref 0–100)
POTASSIUM SERPL-SCNC: 4.1 MMOL/L (ref 3.5–5.2)
PROT SERPL-MCNC: 7.2 G/DL (ref 6–8.5)
SODIUM SERPL-SCNC: 142 MMOL/L (ref 136–145)
TRIGL SERPL-MCNC: 353 MG/DL (ref 0–150)
VLDLC SERPL CALC-MCNC: 65 MG/DL (ref 5–40)

## 2021-05-24 PROCEDURE — 99214 OFFICE O/P EST MOD 30 MIN: CPT | Performed by: FAMILY MEDICINE

## 2021-05-24 RX ORDER — TRAZODONE HYDROCHLORIDE 50 MG/1
25 TABLET ORAL NIGHTLY
Qty: 30 TABLET | Refills: 1
Start: 2021-05-24

## 2021-05-24 NOTE — PROGRESS NOTES
Marek Park is a 60 y.o. female. Presents today for   Chief Complaint   Patient presents with   • Anxiety   • Hypertension   • Hyperlipidemia       Anxiety  Presents for follow-up visit. Symptoms include muscle tension. Patient reports no chest pain, depressed mood, nervous/anxious behavior or shortness of breath. Symptoms occur rarely. Current severity: stable on meds.     Compliance with medications is %.   Hypertension  This is a chronic problem. The current episode started more than 1 year ago. The problem is unchanged. The problem is controlled. Associated symptoms include anxiety. Pertinent negatives include no chest pain or shortness of breath. Risk factors for coronary artery disease include dyslipidemia and post-menopausal state. The current treatment provides moderate improvement. There are no compliance problems.    Hyperlipidemia  This is a chronic problem. The current episode started more than 1 year ago. The problem is controlled. Recent lipid tests were reviewed and are normal. Pertinent negatives include no chest pain or shortness of breath. Current antihyperlipidemic treatment includes statins. The current treatment provides moderate improvement of lipids. Risk factors for coronary artery disease include dyslipidemia, hypertension and post-menopausal.     Reports sits in car too long glut hurts;      Has UC, on humira immunomodulator;  Currently in remission;      Review of Systems   Respiratory: Negative for shortness of breath.    Cardiovascular: Negative for chest pain.   Psychiatric/Behavioral: The patient is not nervous/anxious.        Patient Active Problem List   Diagnosis   • Acute post-traumatic stress disorder   • Anxiety   • Benign essential hypertension   • Excessive cerumen in ear canal   • Cough   • Dyslipidemia   • External hemorrhoids   • Gastroesophageal reflux disease   • Hemorrhoids   • Panic disorder without agoraphobia   • Peripheral neuropathy   • Ulcerative  colitis (CMS/[facility])   • Ulnar neuropathy   • Immunocompromised state (CMS/HCC)   • High triglycerides   • Right anterior shoulder pain   • Hypertension       Social History     Socioeconomic History   • Marital status:      Spouse name: Not on file   • Number of children: Not on file   • Years of education: Not on file   • Highest education level: Not on file   Tobacco Use   • Smoking status: Never Smoker   • Smokeless tobacco: Never Used   Substance and Sexual Activity   • Alcohol use: No   • Drug use: No   • Sexual activity: Never       Allergies   Allergen Reactions   • Remicade [Infliximab] Hives and Swelling     Facial swelling, hives wide spread   • Sulfa Antibiotics Hives       Current Outpatient Medications on File Prior to Visit   Medication Sig Dispense Refill   • adalimumab (HUMIRA) 40 MG/0.8ML Prefilled Syringe Kit injection Inject 40 mg under the skin 1 (One) Time. Once every 2 weeks     • busPIRone (BUSPAR) 15 MG tablet Take 1 tablet by mouth Daily.     • cetirizine (zyrTEC) 10 MG tablet Take 1 tablet by mouth Daily. 30 tablet 5   • fluticasone (FLONASE) 50 MCG/ACT nasal spray 2 sprays into the nostril(s) as directed by provider Daily. 16 g 5   • ketorolac (ACULAR) 0.5 % ophthalmic solution PLACE 1 DROP IN OS TID FOR 3 DAYS THEN STOP  0   • lisinopril (PRINIVIL,ZESTRIL) 20 MG tablet TAKE 1 TABLET DAILY FOR    BLOOD PRESSURE 90 tablet 1   • omeprazole (priLOSEC) 40 MG capsule TAKE 1 CAPSULE TWICE DAILY 180 capsule 1   • Pediatric Multivitamins-Iron (FLINTSTONES PLUS IRON PO) Take 2 tablets by mouth Daily.     • rosuvastatin (Crestor) 20 MG tablet Take 1 tablet by mouth Every Night. 90 tablet 3   • salicylic acid 17 % gel Apply to foot daily then cover with bandaides. 14 g 5   • traZODone (DESYREL) 50 MG tablet Take 1 tablet by mouth Every Night. 30 tablet 1   • venlafaxine (EFFEXOR) 150 MG tablet sustained-release 24 hour 24 hr tablet TAKE 1 TABLET DAILY 90 tablet 1   • venlafaxine XR (EFFEXOR-XR)  "75 MG 24 hr capsule TAKE 1 CAPSULE DAILY. TAKE WITH EFFEXOR 150 MG. 90 capsule 1     Current Facility-Administered Medications on File Prior to Visit   Medication Dose Route Frequency Provider Last Rate Last Admin   • lidocaine (XYLOCAINE) 2% injection 1 mL  1 mL Injection Once Jovita Auguste APRN       • triamcinolone acetonide (KENALOG-40) injection 40 mg  40 mg Intra-articular Once Jovita Auguste APRN           Objective   Vitals:    05/24/21 0937   BP: 112/62   Pulse: 93   SpO2: 97%   Weight: 65.3 kg (144 lb)   Height: 152.4 cm (60\")     Body mass index is 28.12 kg/m².    Physical Exam  Vitals and nursing note reviewed.   Constitutional:       Appearance: She is well-developed.   HENT:      Head: Normocephalic and atraumatic.   Neck:      Thyroid: No thyromegaly.      Vascular: No JVD.   Cardiovascular:      Rate and Rhythm: Normal rate and regular rhythm.      Heart sounds: Normal heart sounds. No murmur heard.   No friction rub. No gallop.    Pulmonary:      Effort: Pulmonary effort is normal. No respiratory distress.      Breath sounds: Normal breath sounds. No wheezing or rales.   Abdominal:      General: Bowel sounds are normal. There is no distension.      Palpations: Abdomen is soft.      Tenderness: There is no abdominal tenderness. There is no guarding or rebound.   Musculoskeletal:      Cervical back: Neck supple.   Skin:     General: Skin is warm and dry.   Neurological:      Mental Status: She is alert.   Psychiatric:         Behavior: Behavior normal.         Assessment/Plan   Diagnoses and all orders for this visit:    1. Benign essential hypertension (Primary)    2. Dyslipidemia  -     Comprehensive Metabolic Panel  -     Lipid Panel    3. Ulcerative rectosigmoiditis without complication (CMS/[facility])    4. Immunocompromised state (CMS/[facility])    5. JAYCE (generalized anxiety disorder)    -uc - on humira, continue  -jayce - would like therapy;   Up set as Son not letting grandson take scholarship to " [employer];  Reports not sleeping but not taking trazidine as gives headache, will have try 1/2 only at night  -HLD - continue statin, recheck lipids.  -hypertension - controlled, continue medications  Ok for otc supplement for hot flashes;             -Follow up: 6 months and prn

## 2021-05-31 NOTE — PROGRESS NOTES
Call results to patient.  Kidney and liver function normal  Cholesterol high, but improved from last check, greatly.

## 2021-06-11 DIAGNOSIS — F41.9 ANXIETY: ICD-10-CM

## 2021-06-11 RX ORDER — VENLAFAXINE HYDROCHLORIDE 75 MG/1
CAPSULE, EXTENDED RELEASE ORAL
Qty: 90 CAPSULE | Refills: 1 | Status: SHIPPED | OUTPATIENT
Start: 2021-06-11 | End: 2021-08-15

## 2021-06-11 NOTE — TELEPHONE ENCOUNTER
PATIENT NEEDS REFILL ON::venlafaxine XR (EFFEXOR-XR) 75 MG 24 hr capsule     PATIENT CAN BE REACHED ON: [phone]    PHARMACY PREFERRED:[id number] - [place], [place] - [address] AT [facility] - [phone]  - [phone]   [phone]

## 2021-07-09 RX ORDER — LISINOPRIL 20 MG/1
TABLET ORAL
Qty: 90 TABLET | Refills: 3 | Status: SHIPPED | OUTPATIENT
Start: 2021-07-09 | End: 2022-06-23

## 2021-07-13 RX ORDER — CETIRIZINE HYDROCHLORIDE 10 MG/1
10 TABLET ORAL DAILY
Qty: 30 TABLET | Refills: 5 | Status: SHIPPED | OUTPATIENT
Start: 2021-07-13 | End: 2022-02-04

## 2021-07-19 DIAGNOSIS — F41.9 ANXIETY: ICD-10-CM

## 2021-07-19 RX ORDER — BUSPIRONE HYDROCHLORIDE 15 MG/1
15 TABLET ORAL DAILY
Start: 2021-07-19 | End: 2021-07-21 | Stop reason: SDUPTHER

## 2021-07-21 DIAGNOSIS — F41.9 ANXIETY: ICD-10-CM

## 2021-07-21 RX ORDER — BUSPIRONE HYDROCHLORIDE 15 MG/1
15 TABLET ORAL DAILY
Qty: 30 TABLET | Refills: 5 | Status: SHIPPED | OUTPATIENT
Start: 2021-07-21 | End: 2021-11-29 | Stop reason: ALTCHOICE

## 2021-07-21 NOTE — TELEPHONE ENCOUNTER
PATIENT STATES SHE CALLED IN FOR A REFILL ON HER busPIRone (BUSPAR) 15 MG tablet AND WAS ADVISED THAT IT WAS SENT TO THE PHARMACY YESTERDAY. PATIENT STATES SHE HAS CALLED THEM A FEW TIMES AND THEY DO NOT HAVE THE SCRIPT. PLEASE RESEND. JOHANA FATIMA ON [address] [facility].    PLEASE ADVISE: [phone]

## 2021-08-12 DIAGNOSIS — F41.9 ANXIETY: ICD-10-CM

## 2021-08-12 RX ORDER — VENLAFAXINE HYDROCHLORIDE 150 MG/1
TABLET, EXTENDED RELEASE ORAL
Qty: 90 TABLET | Refills: 1 | Status: SHIPPED | OUTPATIENT
Start: 2021-08-12 | End: 2022-02-15 | Stop reason: SDUPTHER

## 2021-08-12 RX ORDER — OMEPRAZOLE 40 MG/1
CAPSULE, DELAYED RELEASE ORAL
Qty: 180 CAPSULE | Refills: 1 | Status: SHIPPED | OUTPATIENT
Start: 2021-08-12 | End: 2022-02-15 | Stop reason: SDUPTHER

## 2021-08-14 DIAGNOSIS — F41.9 ANXIETY: ICD-10-CM

## 2021-08-14 DIAGNOSIS — E78.5 DYSLIPIDEMIA: ICD-10-CM

## 2021-08-15 RX ORDER — ROSUVASTATIN CALCIUM 20 MG/1
20 TABLET, COATED ORAL NIGHTLY
Qty: 30 TABLET | Refills: 9 | Status: SHIPPED | OUTPATIENT
Start: 2021-08-15 | End: 2021-09-28

## 2021-08-15 RX ORDER — VENLAFAXINE HYDROCHLORIDE 75 MG/1
CAPSULE, EXTENDED RELEASE ORAL
Qty: 30 CAPSULE | Refills: 9 | Status: SHIPPED | OUTPATIENT
Start: 2021-08-15 | End: 2021-08-30

## 2021-08-30 DIAGNOSIS — F41.9 ANXIETY: ICD-10-CM

## 2021-08-30 RX ORDER — VENLAFAXINE HYDROCHLORIDE 75 MG/1
CAPSULE, EXTENDED RELEASE ORAL
Qty: 90 CAPSULE | Refills: 1 | Status: SHIPPED | OUTPATIENT
Start: 2021-08-30 | End: 2022-02-15 | Stop reason: SDUPTHER

## 2021-08-30 RX ORDER — VENLAFAXINE HYDROCHLORIDE 75 MG/1
CAPSULE, EXTENDED RELEASE ORAL
Qty: 30 CAPSULE | Refills: 9 | OUTPATIENT
Start: 2021-08-30

## 2021-09-13 NOTE — PROGRESS NOTES
"Chief Complaint  Back Pain    Subjective          Marizol Park presents to [facility]  History of Present Illness     Patient is a patient of Dr. Ras Mcconnell.  This is my first time seeing this patient.  She presents today with complaint of back pain increasing for last 4 days.  She reports she about a month ago her  went into hospital.  After she went to several doctor appts with him and has been sitting in uncomfortable chairs.  A few days ago went to sit in toilet and felt a strange movement in her back.       Had fracture in lower back 2-3 years ago.  First day she put on brace using for that.  Then she got a smaller brace.     Worsening factors:  Sitting in chairs  Alleviating factors: tylenol at times, stretches in bed.     Hasn't tried ice therapy.      [facility]'t [facility] and that helped a lot before COVID hit.      Objective   Vital Signs:   /72   Pulse 94   Ht 152.4 cm (60\")   Wt 65.8 kg (145 lb)   SpO2 96%   BMI 28.32 kg/m²     Physical Exam  Constitutional:       Appearance: Normal appearance.   Musculoskeletal:      Lumbar back: Tenderness (Right paraspinal muscles) present. Decreased range of motion. Negative right straight leg raise test and negative left straight leg raise test.      Comments: Back brace in place.  Patient took off for range of motion   Neurological:      Mental Status: She is alert and oriented to person, place, and time.   Psychiatric:         Mood and Affect: Mood normal.         Behavior: Behavior normal.         Thought Content: Thought content normal.         Judgment: Judgment normal.        Result Review :                 Assessment and Plan    Diagnoses and all orders for this visit:    1. Chronic midline low back pain, unspecified whether sciatica present (Primary)  -     triamcinolone acetonide (KENALOG-40) injection 40 mg    Other orders  -     tiZANidine (Zanaflex) 4 MG tablet; Take 1 tablet by mouth Every 8 (Eight) Hours " As Needed for Muscle Spasms.  Dispense: 30 tablet; Refill: 0      Discussed the importance of stretches and samples of stretches shown in office.  Use of ice for 20-minute increments 4 times daily recommended.  Discussed with patient that she can wear the compression type brace but I would not wear the brace that she wore when she had a fracture as this impedes range of motion and makes her back feel tighter.  Steroid given for inflammation.  Start muscle relaxer as needed.  May continue to use Tylenol as well.  Follow-up if no improvement.        Follow Up   No follow-ups on file.  Patient was given instructions and counseling regarding her condition or for health maintenance advice. Please see specific information pulled into the AVS if appropriate.

## 2021-09-14 ENCOUNTER — OFFICE VISIT (OUTPATIENT)
Dept: FAMILY MEDICINE CLINIC | Facility: CLINIC | Age: 61
End: 2021-09-14

## 2021-09-14 VITALS
WEIGHT: 145 LBS | HEART RATE: 94 BPM | BODY MASS INDEX: 28.47 KG/M2 | OXYGEN SATURATION: 96 % | SYSTOLIC BLOOD PRESSURE: 118 MMHG | DIASTOLIC BLOOD PRESSURE: 72 MMHG | HEIGHT: 60 IN

## 2021-09-14 DIAGNOSIS — M54.50 CHRONIC MIDLINE LOW BACK PAIN, UNSPECIFIED WHETHER SCIATICA PRESENT: Primary | ICD-10-CM

## 2021-09-14 DIAGNOSIS — G89.29 CHRONIC MIDLINE LOW BACK PAIN, UNSPECIFIED WHETHER SCIATICA PRESENT: Primary | ICD-10-CM

## 2021-09-14 PROCEDURE — 96372 THER/PROPH/DIAG INJ SC/IM: CPT | Performed by: NURSE PRACTITIONER

## 2021-09-14 PROCEDURE — 99213 OFFICE O/P EST LOW 20 MIN: CPT | Performed by: NURSE PRACTITIONER

## 2021-09-14 RX ORDER — TRIAMCINOLONE ACETONIDE 40 MG/ML
40 INJECTION, SUSPENSION INTRA-ARTICULAR; INTRAMUSCULAR ONCE
Status: COMPLETED | OUTPATIENT
Start: 2021-09-14 | End: 2021-09-14

## 2021-09-14 RX ORDER — TIZANIDINE 4 MG/1
4 TABLET ORAL EVERY 8 HOURS PRN
Qty: 30 TABLET | Refills: 0 | Status: SHIPPED | OUTPATIENT
Start: 2021-09-14 | End: 2021-09-21

## 2021-09-14 RX ADMIN — TRIAMCINOLONE ACETONIDE 40 MG: 40 INJECTION, SUSPENSION INTRA-ARTICULAR; INTRAMUSCULAR at 08:56

## 2021-09-21 RX ORDER — TIZANIDINE 4 MG/1
4 TABLET ORAL EVERY 8 HOURS PRN
Qty: 30 TABLET | Refills: 5 | Status: SHIPPED | OUTPATIENT
Start: 2021-09-21 | End: 2023-03-24 | Stop reason: SDUPTHER

## 2021-09-28 ENCOUNTER — OFFICE VISIT (OUTPATIENT)
Dept: FAMILY MEDICINE CLINIC | Facility: CLINIC | Age: 61
End: 2021-09-28

## 2021-09-28 VITALS
DIASTOLIC BLOOD PRESSURE: 74 MMHG | SYSTOLIC BLOOD PRESSURE: 118 MMHG | BODY MASS INDEX: 28.66 KG/M2 | HEART RATE: 91 BPM | OXYGEN SATURATION: 98 % | HEIGHT: 60 IN | WEIGHT: 146 LBS

## 2021-09-28 DIAGNOSIS — R61 HYPERHIDROSIS: Primary | ICD-10-CM

## 2021-09-28 PROCEDURE — 99213 OFFICE O/P EST LOW 20 MIN: CPT | Performed by: FAMILY MEDICINE

## 2021-09-28 RX ORDER — GLYCOPYRROLATE 1 MG/1
1 TABLET ORAL 2 TIMES DAILY
Qty: 60 TABLET | Refills: 5 | Status: SHIPPED | OUTPATIENT
Start: 2021-09-28 | End: 2021-12-02

## 2021-09-28 RX ORDER — PRAVASTATIN SODIUM 20 MG
20 TABLET ORAL DAILY
Qty: 30 TABLET | Refills: 6 | Status: SHIPPED | OUTPATIENT
Start: 2021-09-28 | End: 2021-10-20 | Stop reason: SDUPTHER

## 2021-09-28 NOTE — PROGRESS NOTES
Marek Park is a 61 y.o. female. Presents today for   Chief Complaint   Patient presents with   • Hot Flashes   • Night Sweats       History of Present Illness  Patient here for hyperhidrosis;  Sweats constantly and feels hot;      Review of Systems   Constitutional: Positive for diaphoresis.       Patient Active Problem List   Diagnosis   • Acute post-traumatic stress disorder   • Anxiety   • Benign essential hypertension   • Excessive cerumen in ear canal   • Cough   • Dyslipidemia   • External hemorrhoids   • Gastroesophageal reflux disease   • Hemorrhoids   • Panic disorder without agoraphobia   • Peripheral neuropathy   • Ulcerative colitis (CMS/HCC)   • Ulnar neuropathy   • Immunocompromised state (CMS/HCC)   • High triglycerides   • Right anterior shoulder pain   • Hypertension       Social History     Socioeconomic History   • Marital status:      Spouse name: Not on file   • Number of children: Not on file   • Years of education: Not on file   • Highest education level: Not on file   Tobacco Use   • Smoking status: Never Smoker   • Smokeless tobacco: Never Used   Substance and Sexual Activity   • Alcohol use: No   • Drug use: No   • Sexual activity: Never       Allergies   Allergen Reactions   • Remicade [Infliximab] Hives and Swelling     Facial swelling, hives wide spread   • Sulfa Antibiotics Hives       Current Outpatient Medications on File Prior to Visit   Medication Sig Dispense Refill   • adalimumab (HUMIRA) 40 MG/0.8ML Prefilled Syringe Kit injection Inject 40 mg under the skin 1 (One) Time. Once every 2 weeks     • busPIRone (BUSPAR) 15 MG tablet Take 1 tablet by mouth Daily. 30 tablet 5   • cetirizine (zyrTEC) 10 MG tablet TAKE 1 TABLET BY MOUTH DAILY 30 tablet 5   • fluticasone (FLONASE) 50 MCG/ACT nasal spray 2 sprays into the nostril(s) as directed by provider Daily. 16 g 5   • lisinopril (PRINIVIL,ZESTRIL) 20 MG tablet TAKE 1 TABLET DAILY FOR    BLOOD PRESSURE 90 tablet 3  "  • omeprazole (priLOSEC) 40 MG capsule TAKE 1 CAPSULE TWICE DAILY 180 capsule 1   • Pediatric Multivitamins-Iron (FLINTSTONES PLUS IRON PO) Take 2 tablets by mouth Daily.     • tiZANidine (ZANAFLEX) 4 MG tablet Take 1 tablet by mouth Every 8 (Eight) Hours As Needed for Muscle Spasms. 30 tablet 5   • traZODone (DESYREL) 50 MG tablet Take 0.5 tablets by mouth Every Night. 30 tablet 1   • venlafaxine (EFFEXOR) 150 MG tablet sustained-release 24 hour 24 hr tablet TAKE 1 TABLET DAILY 90 tablet 1   • venlafaxine XR (EFFEXOR-XR) 75 MG 24 hr capsule TAKE 1 CAPSULE DAILY. TAKE WITH EFFEXOR 150 MG. 90 capsule 1   • [DISCONTINUED] rosuvastatin (CRESTOR) 20 MG tablet TAKE 1 TABLET BY MOUTH EVERY NIGHT 30 tablet 9     Current Facility-Administered Medications on File Prior to Visit   Medication Dose Route Frequency Provider Last Rate Last Admin   • lidocaine (XYLOCAINE) 2% injection 1 mL  1 mL Injection Once Kalyani, ELISABETH Vazquez           Objective   Vitals:    09/28/21 1454   BP: 118/74   Pulse: 91   SpO2: 98%   Weight: 66.2 kg (146 lb)   Height: 152.4 cm (60\")   PainSc: 0-No pain     Body mass index is 28.51 kg/m².    Physical Exam  Vitals and nursing note reviewed.   Constitutional:       Appearance: Normal appearance. She is not toxic-appearing or diaphoretic.   HENT:      Head: Normocephalic and atraumatic.   Musculoskeletal:      Cervical back: Neck supple.   Skin:     General: Skin is warm and dry.      Capillary Refill: Capillary refill takes less than 2 seconds.   Neurological:      Mental Status: She is alert.   Psychiatric:         Mood and Affect: Mood normal.         Behavior: Behavior normal.         Assessment/Plan   Diagnoses and all orders for this visit:    1. Hyperhidrosis (Primary)  -     glycopyrrolate (Robinul) 1 MG tablet; Take 1 tablet by mouth 2 (Two) Times a Day.  Dispense: 60 tablet; Refill: 5  -     Glycopyrronium Tosylate 2.4 % pads; Apply 1 application topically Daily As Needed (hyperhidrosis).  " Dispense: 30 each; Refill: 5    will send oral to try but d/w can cause severe dry mucous membranes;  Will send the topical to try as better tolerated.    Stopped rosuvastatin as caused myalgias, will try pravastatin.       -Follow up: Prn - RTC if worse or no improvement.

## 2021-10-20 DIAGNOSIS — E78.5 DYSLIPIDEMIA: Primary | ICD-10-CM

## 2021-10-20 RX ORDER — PRAVASTATIN SODIUM 20 MG
20 TABLET ORAL DAILY
Qty: 90 TABLET | Refills: 3 | Status: SHIPPED | OUTPATIENT
Start: 2021-10-20 | End: 2022-05-23 | Stop reason: SDUPTHER

## 2021-11-09 ENCOUNTER — TELEPHONE (OUTPATIENT)
Dept: FAMILY MEDICINE CLINIC | Facility: CLINIC | Age: 61
End: 2021-11-09

## 2021-11-09 ENCOUNTER — OFFICE VISIT (OUTPATIENT)
Dept: FAMILY MEDICINE CLINIC | Facility: CLINIC | Age: 61
End: 2021-11-09

## 2021-11-09 VITALS
OXYGEN SATURATION: 89 % | SYSTOLIC BLOOD PRESSURE: 124 MMHG | TEMPERATURE: 98.1 F | DIASTOLIC BLOOD PRESSURE: 72 MMHG | HEIGHT: 60 IN | BODY MASS INDEX: 29.25 KG/M2 | WEIGHT: 149 LBS | HEART RATE: 97 BPM

## 2021-11-09 DIAGNOSIS — J02.9 SORE THROAT: Primary | ICD-10-CM

## 2021-11-09 DIAGNOSIS — R53.83 FATIGUE, UNSPECIFIED TYPE: ICD-10-CM

## 2021-11-09 LAB
EXPIRATION DATE: NORMAL
EXPIRATION DATE: NORMAL
FLUAV AG NPH QL: NEGATIVE
FLUBV AG NPH QL: NEGATIVE
INTERNAL CONTROL: NORMAL
INTERNAL CONTROL: NORMAL
Lab: NORMAL
Lab: NORMAL
S PYO AG THROAT QL: NEGATIVE

## 2021-11-09 PROCEDURE — 87880 STREP A ASSAY W/OPTIC: CPT | Performed by: NURSE PRACTITIONER

## 2021-11-09 PROCEDURE — 99214 OFFICE O/P EST MOD 30 MIN: CPT | Performed by: NURSE PRACTITIONER

## 2021-11-09 PROCEDURE — 87804 INFLUENZA ASSAY W/OPTIC: CPT | Performed by: NURSE PRACTITIONER

## 2021-11-09 RX ORDER — METHYLPREDNISOLONE 4 MG/1
TABLET ORAL
Qty: 21 TABLET | Refills: 0 | Status: SHIPPED | OUTPATIENT
Start: 2021-11-09 | End: 2021-11-29

## 2021-11-09 RX ORDER — AMOXICILLIN AND CLAVULANATE POTASSIUM 875; 125 MG/1; MG/1
1 TABLET, FILM COATED ORAL 2 TIMES DAILY
Qty: 14 TABLET | Refills: 0 | Status: SHIPPED | OUTPATIENT
Start: 2021-11-09 | End: 2021-11-29

## 2021-11-09 RX ORDER — DEXTROMETHORPHAN HYDROBROMIDE AND PROMETHAZINE HYDROCHLORIDE 15; 6.25 MG/5ML; MG/5ML
5 SYRUP ORAL 4 TIMES DAILY PRN
Qty: 120 ML | Refills: 0 | Status: SHIPPED | OUTPATIENT
Start: 2021-11-09 | End: 2021-11-29

## 2021-11-09 RX ORDER — ALBUTEROL SULFATE 90 UG/1
2 AEROSOL, METERED RESPIRATORY (INHALATION) EVERY 4 HOURS PRN
Qty: 18 G | Refills: 0 | Status: SHIPPED | OUTPATIENT
Start: 2021-11-09 | End: 2021-11-24

## 2021-11-09 NOTE — PROGRESS NOTES
"Chief Complaint  Fatigue, Sore Throat, Cough, and Headache    Subjective          Marizol Park presents to [facility]  History of Present Illness    Patient is a patient of Dr. Ras Mcconnell.  She presents today with complaint of sore throat, cough, headache, and fatigue since last two days. States it started with sore throat 2 days ago and yesterday fatigue started. Denies any known exposure.      Reports sore throat, cough with phlegm productions, headache, fatigue, ear problems (went swimming Sunday), shortness of breath at times  Denies fever, loss of sense of taste or smell.      OTC: chloraseptic    Objective   Vital Signs:   /72   Pulse 97   Temp 98.1 °F (36.7 °C)   Ht 152.4 cm (60\")   Wt 67.6 kg (149 lb)   SpO2 (!) 89%   BMI 29.10 kg/m²     Physical Exam  Constitutional:       Appearance: Normal appearance.   HENT:      Head: Normocephalic and atraumatic.      Right Ear: Tympanic membrane has decreased mobility.      Left Ear: Tympanic membrane has decreased mobility.      Nose: Rhinorrhea present.      Right Sinus: No maxillary sinus tenderness.      Left Sinus: No maxillary sinus tenderness.      Mouth/Throat:      Lips: Pink.      Pharynx: Oropharynx is clear. Posterior oropharyngeal erythema present.      Tonsils: 0 on the right. 0 on the left.   Cardiovascular:      Rate and Rhythm: Normal rate and regular rhythm.      Pulses: Normal pulses.   Pulmonary:      Effort: Pulmonary effort is normal.      Comments: Bronchial sounds noted  Skin:     General: Skin is warm and dry.   Neurological:      Mental Status: She is alert.   Psychiatric:         Mood and Affect: Mood normal.         Behavior: Behavior normal.         Thought Content: Thought content normal.         Judgment: Judgment normal.        Result Review :     Influenza A&B    [facility] 11/9/21   Rapid Influenza A Ag Negative   Rapid Influenza B Ag Negative           Strep    [facility] 11/9/21 "   POC Strep A, Molecular Negative                     Assessment and Plan    Diagnoses and all orders for this visit:    1. Sore throat (Primary)  -     POCT rapid strep A  -     POCT Influenza A/B  -     COVID-19,[facility] ROUTINE, NP/OP SWAB IN TRANSPORT MEDIA OR ESWAB 72 HR TAT - Swab, Oropharynx    2. Fatigue, unspecified type  -     POCT rapid strep A  -     POCT Influenza A/B  -     COVID-19,[facility] ROUTINE, NP/OP SWAB IN TRANSPORT MEDIA OR ESWAB 72 HR TAT - Swab, Oropharynx    Other orders  -     methylPREDNISolone (MEDROL) 4 MG dose pack; Take as directed on package instructions.  Dispense: 21 tablet; Refill: 0  -     amoxicillin-clavulanate (AUGMENTIN) 875-125 MG per tablet; Take 1 tablet by mouth 2 (Two) Times a Day.  Dispense: 14 tablet; Refill: 0  -     albuterol sulfate  (90 Base) MCG/ACT inhaler; Inhale 2 puffs Every 4 (Four) Hours As Needed for Wheezing or Shortness of Air.  Dispense: 18 g; Refill: 0  -     promethazine-dextromethorphan (PROMETHAZINE-DM) 6.25-15 MG/5ML syrup; Take 5 mL by mouth 4 (Four) Times a Day As Needed for Cough.  Dispense: 120 mL; Refill: 0      Treating for bronchitis.  Await results of Covid screen.  Use albuterol and Phenergan DM as needed.  Use allergy medicine OTC.  Follow-up as needed.  Self quarantine until results.      Follow Up   No follow-ups on file.  Patient was given instructions and counseling regarding her condition or for health maintenance advice. Please see specific information pulled into the AVS if appropriate.

## 2021-11-10 LAB
LABCORP SARS-COV-2, NAA 2 DAY TAT: NORMAL
SARS-COV-2 RNA RESP QL NAA+PROBE: NOT DETECTED

## 2021-11-22 ENCOUNTER — TELEPHONE (OUTPATIENT)
Dept: FAMILY MEDICINE CLINIC | Facility: CLINIC | Age: 61
End: 2021-11-22

## 2021-11-22 RX ORDER — FLUCONAZOLE 150 MG/1
TABLET ORAL
Qty: 2 TABLET | Refills: 0 | Status: SHIPPED | OUTPATIENT
Start: 2021-11-22 | End: 2021-11-29

## 2021-11-22 NOTE — TELEPHONE ENCOUNTER
Caller: Marizol Park    Relationship: Self    Best call back number:[phone]     What is the best time to reach you: ASAP    Who are you requesting to speak with (clinical staff, provider,  specific staff member): CLINICAL    Do you know the name of the person who called:     What was the call regarding: PATIENT CALLING STATING SHE IS EXPERIENCING ITCHING IN HER PRIVATE AREA NO DISCHARGE  SHE STATED ITS NOT ALL THE TIME SHE STATED SHE HASN'T USED ANYTHING DIFFERENT IT STARTED A COUPLE DAYS AGO     Do you require a callback: YES

## 2021-11-24 RX ORDER — ALBUTEROL SULFATE 90 UG/1
AEROSOL, METERED RESPIRATORY (INHALATION)
Qty: 8.5 G | Refills: 5 | Status: SHIPPED | OUTPATIENT
Start: 2021-11-24

## 2021-11-29 ENCOUNTER — OFFICE VISIT (OUTPATIENT)
Dept: FAMILY MEDICINE CLINIC | Facility: CLINIC | Age: 61
End: 2021-11-29

## 2021-11-29 VITALS
HEART RATE: 71 BPM | OXYGEN SATURATION: 99 % | DIASTOLIC BLOOD PRESSURE: 74 MMHG | SYSTOLIC BLOOD PRESSURE: 122 MMHG | HEIGHT: 60 IN | BODY MASS INDEX: 29.25 KG/M2 | WEIGHT: 149 LBS

## 2021-11-29 DIAGNOSIS — E78.5 DYSLIPIDEMIA: ICD-10-CM

## 2021-11-29 DIAGNOSIS — Z00.00 MEDICARE ANNUAL WELLNESS VISIT, SUBSEQUENT: Primary | ICD-10-CM

## 2021-11-29 DIAGNOSIS — R25.1 TREMORS OF NERVOUS SYSTEM: ICD-10-CM

## 2021-11-29 DIAGNOSIS — Z78.0 MENOPAUSE: ICD-10-CM

## 2021-11-29 DIAGNOSIS — Z23 FLU VACCINE NEED: ICD-10-CM

## 2021-11-29 PROBLEM — M79.609 PAIN IN LIMB: Status: ACTIVE | Noted: 2021-11-29

## 2021-11-29 PROBLEM — L60.0 INGROWN NAIL: Status: ACTIVE | Noted: 2021-11-29

## 2021-11-29 PROCEDURE — 1170F FXNL STATUS ASSESSED: CPT | Performed by: FAMILY MEDICINE

## 2021-11-29 PROCEDURE — 1126F AMNT PAIN NOTED NONE PRSNT: CPT | Performed by: FAMILY MEDICINE

## 2021-11-29 PROCEDURE — 90686 IIV4 VACC NO PRSV 0.5 ML IM: CPT | Performed by: FAMILY MEDICINE

## 2021-11-29 PROCEDURE — G0439 PPPS, SUBSEQ VISIT: HCPCS | Performed by: FAMILY MEDICINE

## 2021-11-29 PROCEDURE — 1159F MED LIST DOCD IN RCRD: CPT | Performed by: FAMILY MEDICINE

## 2021-11-29 PROCEDURE — G0008 ADMIN INFLUENZA VIRUS VAC: HCPCS | Performed by: FAMILY MEDICINE

## 2021-11-29 PROCEDURE — 99214 OFFICE O/P EST MOD 30 MIN: CPT | Performed by: FAMILY MEDICINE

## 2021-11-29 RX ORDER — CALORIC SUPPLEMENT
1 LIQUID (ML) ORAL 2 TIMES DAILY
Qty: 14220 ML | Refills: 12 | Status: SHIPPED | OUTPATIENT
Start: 2021-11-29

## 2021-11-29 NOTE — PATIENT INSTRUCTIONS
Advance Directive    Advance directives are legal documents that let you make choices ahead of time about your health care and medical treatment in case you become unable to communicate for yourself. Advance directives are a way for you to make known your wishes to family, friends, and health care providers. This can let others know about your end-of-life care if you become unable to communicate.  Discussing and writing advance directives should happen over time rather than all at once. Advance directives can be changed depending on your situation and what you want, even after you have signed the advance directives.  There are different types of advance directives, such as:  · Medical power of .  · Living will.  · Do not resuscitate (DNR) or do not attempt resuscitation (DNAR) order.  Health care proxy and medical power of   A health care proxy is also called a health care agent. This is a person who is appointed to make medical decisions for you in cases where you are unable to make the decisions yourself. Generally, people choose someone they know well and trust to represent their preferences. Make sure to ask this person for an agreement to act as your proxy. A proxy may have to exercise judgment in the event of a medical decision for which your wishes are not known.  A medical power of  is a legal document that names your health care proxy. Depending on the laws in your state, after the document is written, it may also need to be:  · Signed.  · Notarized.  · Dated.  · Copied.  · Witnessed.  · Incorporated into your medical record.  You may also want to appoint someone to manage your money in a situation in which you are unable to do so. This is called a durable power of  for finances. It is a separate legal document from the durable power of  for health care. You may choose the same person or someone different from your health care proxy to act as your agent in money  matters.  If you do not appoint a proxy, or if there is a concern that the proxy is not acting in your best interests, a court may appoint a guardian to act on your behalf.  Living will  A living will is a set of instructions that state your wishes about medical care when you cannot express them yourself. Health care providers should keep a copy of your living will in your medical record. You may want to give a copy to family members or friends. To alert caregivers in case of an emergency, you can place a card in your wallet to let them know that you have a living will and where they can find it. A living will is used if you become:  · Terminally ill.  · Disabled.  · Unable to communicate or make decisions.  Items to consider in your living will include:  · To use or not to use life-support equipment, such as dialysis machines and breathing machines (ventilators).  · A DNR or DNAR order. This tells health care providers not to use cardiopulmonary resuscitation (CPR) if breathing or heartbeat stops.  · To use or not to use tube feeding.  · To be given or not to be given food and fluids.  · Comfort (palliative) care when the goal becomes comfort rather than a cure.  · Donation of organs and tissues.  A living will does not give instructions for distributing your money and property if you should pass away.  DNR or DNAR  A DNR or DNAR order is a request not to have CPR in the event that your heart stops beating or you stop breathing. If a DNR or DNAR order has not been made and shared, a health care provider will try to help any patient whose heart has stopped or who has stopped breathing. If you plan to have surgery, talk with your health care provider about how your DNR or DNAR order will be followed if problems occur.  What if I do not have an advance directive?  If you do not have an advance directive, some states assign family decision makers to act on your behalf based on how closely you are related to them. Each  state has its own laws about advance directives. You may want to check with your health care provider, , or state representative about the laws in your state.  Summary  · Advance directives are the legal documents that allow you to make choices ahead of time about your health care and medical treatment in case you become unable to tell others about your care.  · The process of discussing and writing advance directives should happen over time. You can change the advance directives, even after you have signed them.  · Advance directives include DNR or DNAR orders, living esqueda, and designating an agent as your medical power of .  This information is not intended to replace advice given to you by your health care provider. Make sure you discuss any questions you have with your health care provider.  Document Revised: 01/28/2021 Document Reviewed: 07/16/2020  [employer] Patient Education © 2021 [employer]      Calorie Counting for Weight Loss  Calories are units of energy. Your body needs a certain number of calories from food to keep going throughout the day. When you eat or drink more calories than your body needs, your body stores the extra calories mostly as fat. When you eat or drink fewer calories than your body needs, your body burns fat to get the energy it needs.  Calorie counting means keeping track of how many calories you eat and drink each day. Calorie counting can be helpful if you need to lose weight. If you eat fewer calories than your body needs, you should lose weight. Ask your health care provider what a healthy weight is for you.  For calorie counting to work, you will need to eat the right number of calories each day to lose a healthy amount of weight per week. A dietitian can help you figure out how many calories you need in a day and will suggest ways to reach your calorie goal.  · A healthy amount of weight to lose each week is usually 1-2 lb (0.5-0.9 kg). This usually means that  your daily calorie intake should be reduced by 500-750 calories.  · Eating 1,200-1,500 calories a day can help most women lose weight.  · Eating 1,500-1,800 calories a day can help most men lose weight.  What do I need to know about calorie counting?  Work with your health care provider or dietitian to determine how many calories you should get each day. To meet your daily calorie goal, you will need to:  · Find out how many calories are in each food that you would like to eat. Try to do this before you eat.  · Decide how much of the food you plan to eat.  · Keep a food log. Do this by writing down what you ate and how many calories it had.  To successfully lose weight, it is important to balance calorie counting with a healthy lifestyle that includes regular activity.  Where do I find calorie information?    The number of calories in a food can be found on a Nutrition Facts label. If a food does not have a Nutrition Facts label, try to look up the calories online or ask your dietitian for help.  Remember that calories are listed per serving. If you choose to have more than one serving of a food, you will have to multiply the calories per serving by the number of servings you plan to eat. For example, the label on a package of bread might say that a serving size is 1 slice and that there are 90 calories in a serving. If you eat 1 slice, you will have eaten 90 calories. If you eat 2 slices, you will have eaten 180 calories.  How do I keep a food log?  After each time that you eat, record the following in your food log as soon as possible:  · What you ate. Be sure to include toppings, sauces, and other extras on the food.  · How much you ate. This can be measured in cups, ounces, or number of items.  · How many calories were in each food and drink.  · The total number of calories in the food you ate.  Keep your food log near you, such as in a pocket-sized notebook or on an audelia or website on your mobile phone. Some  programs will calculate calories for you and show you how many calories you have left to meet your daily goal.  What are some portion-control tips?  · Know how many calories are in a serving. This will help you know how many servings you can have of a certain food.  · Use a measuring cup to measure serving sizes. You could also try weighing out portions on a kitchen scale. With time, you will be able to estimate serving sizes for some foods.  · Take time to put servings of different foods on your favorite plates or in your favorite bowls and cups so you know what a serving looks like.  · Try not to eat straight from a food's packaging, such as from a bag or box. Eating straight from the package makes it hard to see how much you are eating and can lead to overeating. Put the amount you would like to eat in a cup or on a plate to make sure you are eating the right portion.  · Use smaller plates, glasses, and bowls for smaller portions and to prevent overeating.  · Try not to multitask. For example, avoid watching TV or using your computer while eating. If it is time to eat, sit down at a table and enjoy your food. This will help you recognize when you are full. It will also help you be more mindful of what and how much you are eating.  What are tips for following this plan?  Reading food labels  · Check the calorie count compared with the serving size. The serving size may be smaller than what you are used to eating.  · Check the source of the calories. Try to choose foods that are high in protein, fiber, and vitamins, and low in saturated fat, trans fat, and sodium.  Shopping  · Read nutrition labels while you shop. This will help you make healthy decisions about which foods to buy.  · Pay attention to nutrition labels for low-fat or fat-free foods. These foods sometimes have the same number of calories or more calories than the full-fat versions. They also often have added sugar, starch, or salt to make up for  flavor that was removed with the fat.  · Make a grocery list of lower-calorie foods and stick to it.  Cooking  · Try to cook your favorite foods in a healthier way. For example, try baking instead of frying.  · Use low-fat dairy products.  Meal planning  · Use more fruits and vegetables. One-half of your plate should be fruits and vegetables.  · Include lean proteins, such as chicken, turkey, and fish.  Lifestyle  Each week, aim to do one of the following:  · 150 minutes of moderate exercise, such as walking.  · 75 minutes of vigorous exercise, such as running.  General information  · Know how many calories are in the foods you eat most often. This will help you calculate calorie counts faster.  · Find a way of tracking calories that works for you. Get creative. Try different apps or programs if writing down calories does not work for you.  What foods should I eat?    · Eat nutritious foods. It is better to have a nutritious, high-calorie food, such as an avocado, than a food with few nutrients, such as a bag of potato chips.  · Use your calories on foods and drinks that will fill you up and will not leave you hungry soon after eating.  ? Examples of foods that fill you up are nuts and nut butters, vegetables, lean proteins, and high-fiber foods such as whole grains. High-fiber foods are foods with more than 5 g of fiber per serving.  · Pay attention to calories in drinks. Low-calorie drinks include water and unsweetened drinks.  The items listed above may not be a complete list of foods and beverages you can eat. Contact a dietitian for more information.  What foods should I limit?  Limit foods or drinks that are not good sources of vitamins, minerals, or protein or that are high in unhealthy fats. These include:  · Candy.  · Other sweets.  · Sodas, specialty coffee drinks, alcohol, and juice.  The items listed above may not be a complete list of foods and beverages you should avoid. Contact a dietitian for more  information.  How do I count calories when eating out?  · Pay attention to portions. Often, portions are much larger when eating out. Try these tips to keep portions smaller:  ? Consider sharing a meal instead of getting your own.  ? If you get your own meal, eat only half of it. Before you start eating, ask for a container and put half of your meal into it.  ? When available, consider ordering smaller portions from the menu instead of full portions.  · Pay attention to your food and drink choices. Knowing the way food is cooked and what is included with the meal can help you eat fewer calories.  ? If calories are listed on the menu, choose the lower-calorie options.  ? Choose dishes that include vegetables, fruits, whole grains, low-fat dairy products, and lean proteins.  ? Choose items that are boiled, broiled, grilled, or steamed. Avoid items that are buttered, battered, fried, or served with cream sauce. Items labeled as crispy are usually fried, unless stated otherwise.  ? Choose water, low-fat milk, unsweetened iced tea, or other drinks without added sugar. If you want an alcoholic beverage, choose a lower-calorie option, such as a glass of wine or light beer.  ? Ask for dressings, sauces, and syrups on the side. These are usually high in calories, so you should limit the amount you eat.  ? If you want a salad, choose a garden salad and ask for grilled meats. Avoid extra toppings such as zacarias, cheese, or fried items. Ask for the dressing on the side, or ask for olive oil and vinegar or lemon to use as dressing.  · Estimate how many servings of a food you are given. Knowing serving sizes will help you be aware of how much food you are eating at restaurants.  Where to find more information  · Centers for Disease Control and Prevention: www.cdc.gov  · [place] Department of Agriculture: myplate.gov  Summary  · Calorie counting means keeping track of how many calories you eat and drink each day. If you eat fewer  calories than your body needs, you should lose weight.  · A healthy amount of weight to lose per week is usually 1-2 lb (0.5-0.9 kg). This usually means reducing your daily calorie intake by 500-750 calories.  · The number of calories in a food can be found on a Nutrition Facts label. If a food does not have a Nutrition Facts label, try to look up the calories online or ask your dietitian for help.  · Use smaller plates, glasses, and bowls for smaller portions and to prevent overeating.  · Use your calories on foods and drinks that will fill you up and not leave you hungry shortly after a meal.  This information is not intended to replace advice given to you by your health care provider. Make sure you discuss any questions you have with your health care provider.  Document Revised: 01/28/2021 Document Reviewed: 01/28/2021  [email or web address] Patient Education © 2021 [email or web address]      Exercising to Lose Weight  Exercise is structured, repetitive physical activity to improve fitness and health. Getting regular exercise is important for everyone. It is especially important if you are overweight. Being overweight increases your risk of heart disease, stroke, diabetes, high blood pressure, and several types of cancer. Reducing your calorie intake and exercising can help you lose weight.  Exercise is usually categorized as moderate or vigorous intensity. To lose weight, most people need to do a certain amount of moderate-intensity or vigorous-intensity exercise each week.  Moderate-intensity exercise    Moderate-intensity exercise is any activity that gets you moving enough to burn at least three times more energy (calories) than if you were sitting.  Examples of moderate exercise include:  · Walking a mile in 15 minutes.  · Doing light yard work.  · Biking at an easy pace.  Most people should get at least 150 minutes (2 hours and 30 minutes) a week of moderate-intensity exercise to maintain their body weight.  Vigorous-intensity  exercise  Vigorous-intensity exercise is any activity that gets you moving enough to burn at least six times more calories than if you were sitting. When you exercise at this intensity, you should be working hard enough that you are not able to carry on a conversation.  Examples of vigorous exercise include:  · Running.  · Playing a team sport, such as football, basketball, and soccer.  · Jumping rope.  Most people should get at least 75 minutes (1 hour and 15 minutes) a week of vigorous-intensity exercise to maintain their body weight.  How can exercise affect me?  When you exercise enough to burn more calories than you eat, you lose weight. Exercise also reduces body fat and builds muscle. The more muscle you have, the more calories you burn. Exercise also:  · Improves mood.  · Reduces stress and tension.  · Improves your overall fitness, flexibility, and endurance.  · Increases bone strength.  The amount of exercise you need to lose weight depends on:  · Your age.  · The type of exercise.  · Any health conditions you have.  · Your overall physical ability.  Talk to your health care provider about how much exercise you need and what types of activities are safe for you.  What actions can I take to lose weight?  Nutrition    · Make changes to your diet as told by your health care provider or diet and nutrition specialist (dietitian). This may include:  ? Eating fewer calories.  ? Eating more protein.  ? Eating less unhealthy fats.  ? Eating a diet that includes fresh fruits and vegetables, whole grains, low-fat dairy products, and lean protein.  ? Avoiding foods with added fat, salt, and sugar.  · Drink plenty of water while you exercise to prevent dehydration or heat stroke.    Activity  · Choose an activity that you enjoy and set realistic goals. Your health care provider can help you make an exercise plan that works for you.  · Exercise at a moderate or vigorous intensity most days of the week.  ? The intensity  of exercise may vary from person to person. You can tell how intense a workout is for you by paying attention to your breathing and heartbeat. Most people will notice their breathing and heartbeat get faster with more intense exercise.  · Do resistance training twice each week, such as:  ? Push-ups.  ? Sit-ups.  ? Lifting weights.  ? Using resistance bands.  · Getting short amounts of exercise can be just as helpful as long structured periods of exercise. If you have trouble finding time to exercise, try to include exercise in your daily routine.  ? Get up, stretch, and walk around every 30 minutes throughout the day.  ? Go for a walk during your lunch break.  ? Park your car farther away from your destination.  ? If you take public transportation, get off one stop early and walk the rest of the way.  ? Make phone calls while standing up and walking around.  ? Take the stairs instead of elevators or escalators.  · Wear comfortable clothes and shoes with good support.  · Do not exercise so much that you hurt yourself, feel dizzy, or get very short of breath.  Where to find more information  · [place] Department of Health and Human Services: www.hhs.gov  · Centers for Disease Control and Prevention (CDC): www.cdc.gov  Contact a health care provider:  · Before starting a new exercise program.  · If you have questions or concerns about your weight.  · If you have a medical problem that keeps you from exercising.  Get help right away if you have any of the following while exercising:  · Injury.  · Dizziness.  · Difficulty breathing or shortness of breath that does not go away when you stop exercising.  · Chest pain.  · Rapid heartbeat.  Summary  · Being overweight increases your risk of heart disease, stroke, diabetes, high blood pressure, and several types of cancer.  · Losing weight happens when you burn more calories than you eat.  · Reducing the amount of calories you eat in addition to getting regular moderate or  vigorous exercise each week helps you lose weight.  This information is not intended to replace advice given to you by your health care provider. Make sure you discuss any questions you have with your health care provider.  Document Revised: 04/15/2021 Document Reviewed: 04/15/2021  [employer] Patient Education © 2021 [employer]

## 2021-11-29 NOTE — PROGRESS NOTES
QUICK REFERENCE INFORMATION:  The ABCs of the Annual Wellness Visit    Subsequent Medicare Wellness Visit    HEALTH RISK ASSESSMENT    1960    Recent Hospitalizations:  No hospitalization(s) within the last year..        Current Medical Providers:  Patient Care Team:  Ras Mcconnell DO as PCP - General (Family Medicine)        Smoking Status:  Social History     Tobacco Use   Smoking Status Never Smoker   Smokeless Tobacco Never Used       Alcohol Consumption:  Social History     Substance and Sexual Activity   Alcohol Use No       Depression Screen:   PHQ-2/PHQ-9 Depression Screening 11/29/2021   Little interest or pleasure in doing things 0   Feeling down, depressed, or hopeless 0   Trouble falling or staying asleep, or sleeping too much 0   Feeling tired or having little energy 0   Poor appetite or overeating 0   Feeling bad about yourself - or that you are a failure or have let yourself or your family down 0   Trouble concentrating on things, such as reading the newspaper or watching television 0   Moving or speaking so slowly that other people could have noticed. Or the opposite - being so fidgety or restless that you have been moving around a lot more than usual 0   Thoughts that you would be better off dead, or of hurting yourself in some way 0   Total Score 0   If you checked off any problems, how difficult have these problems made it for you to do your work, take care of things at home, or get along with other people? Not difficult at all       Health Habits and Functional and Cognitive Screening:  Functional & Cognitive Status 11/29/2021   Do you have difficulty preparing food and eating? No   Do you have difficulty bathing yourself, getting dressed or grooming yourself? No   Do you have difficulty using the toilet? No   Do you have difficulty moving around from place to place? No   Do you have trouble with steps or getting out of a bed or a chair? No   Current Diet Well Balanced Diet   Dental  Exam Up to date   Eye Exam Up to date   Exercise (times per week) 3 times per week   Current Exercises Include Walking   Current Exercise Activities Include -   Do you need help using the phone?  No   Are you deaf or do you have serious difficulty hearing?  No   Do you need help with transportation? No   Do you need help shopping? No   Do you need help preparing meals?  No   Do you need help with housework?  No   Do you need help with laundry? No   Do you need help taking your medications? No   Do you need help managing money? No   Do you ever drive or ride in a car without wearing a seat belt? No   Have you felt unusual stress, anger or loneliness in the last month? No   Who do you live with? Spouse   If you need help, do you have trouble finding someone available to you? No   Have you been bothered in the last four weeks by sexual problems? No   Do you have difficulty concentrating, remembering or making decisions? No           Does the patient have evidence of cognitive impairment? No    Aspirin use counseling: Does not need ASA (and currently is not on it)      Recent Lab Results:  CMP:  Lab Results   Component Value Date     (H) 01/23/2018    BUN 18 05/24/2021    CREATININE 0.67 05/24/2021    EGFRIFNONA 89 05/24/2021    EGFRIFAFRI 108 05/24/2021    BCR 26.9 (H) 05/24/2021     05/24/2021    K 4.1 05/24/2021    CO2 31.4 (H) 05/24/2021    CALCIUM 9.8 05/24/2021    PROTENTOTREF 7.2 05/24/2021    ALBUMIN 4.30 05/24/2021    LABGLOBREF 2.9 05/24/2021    LABIL2 1.5 05/24/2021    BILITOT 0.3 05/24/2021    ALKPHOS 100 05/24/2021    AST 30 05/24/2021    ALT 36 (H) 05/24/2021     Lipid Panel:  Lab Results   Component Value Date    TRIG 353 (H) 05/24/2021    HDL 54 05/24/2021    VLDL 65 (H) 05/24/2021     HbA1c:       Visual Acuity:  No exam data present    Age-appropriate Screening Schedule:  Refer to the list below for future screening recommendations based on patient's age, sex and/or medical conditions.  Orders for these recommended tests are listed in the plan section. The patient has been provided with a written plan.    Health Maintenance   Topic Date Due   • PAP SMEAR  05/21/2018   • LIPID PANEL  05/24/2022   • MAMMOGRAM  01/13/2023   • TDAP/TD VACCINES (2 - Td or Tdap) 08/16/2026   • INFLUENZA VACCINE  Completed        Subjective   History of Present Illness    Marizol Park is a 61 y.o. female who presents for an Subsequent Wellness Visit.    The following portions of the patient's history were reviewed and updated as appropriate: allergies, current medications, past family history, past medical history, past social history, past surgical history and problem list.    Outpatient Medications Prior to Visit   Medication Sig Dispense Refill   • adalimumab (HUMIRA) 40 MG/0.8ML Prefilled Syringe Kit injection Inject 40 mg under the skin 1 (One) Time. Once every 2 weeks     • albuterol sulfate  (90 Base) MCG/ACT inhaler INHALE 2 PUFFS BY MOUTH EVERY 4 HOURS AS NEEDED FOR WHEEZING OR SHORTNESS OF BREATH 8.5 g 5   • cetirizine (zyrTEC) 10 MG tablet TAKE 1 TABLET BY MOUTH DAILY 30 tablet 5   • fluticasone (FLONASE) 50 MCG/ACT nasal spray 2 sprays into the nostril(s) as directed by provider Daily. 16 g 5   • glycopyrrolate (Robinul) 1 MG tablet Take 1 tablet by mouth 2 (Two) Times a Day. 60 tablet 5   • Glycopyrronium Tosylate 2.4 % pads Apply 1 application topically Daily As Needed (hyperhidrosis). 30 each 5   • lisinopril (PRINIVIL,ZESTRIL) 20 MG tablet TAKE 1 TABLET DAILY FOR    BLOOD PRESSURE 90 tablet 3   • omeprazole (priLOSEC) 40 MG capsule TAKE 1 CAPSULE TWICE DAILY 180 capsule 1   • pravastatin (Pravachol) 20 MG tablet Take 1 tablet by mouth Daily. 90 tablet 3   • tiZANidine (ZANAFLEX) 4 MG tablet Take 1 tablet by mouth Every 8 (Eight) Hours As Needed for Muscle Spasms. 30 tablet 5   • traZODone (DESYREL) 50 MG tablet Take 0.5 tablets by mouth Every Night. 30 tablet 1   • venlafaxine (EFFEXOR) 150 MG tablet  sustained-release 24 hour 24 hr tablet TAKE 1 TABLET DAILY 90 tablet 1   • venlafaxine XR (EFFEXOR-XR) 75 MG 24 hr capsule TAKE 1 CAPSULE DAILY. TAKE WITH EFFEXOR 150 MG. 90 capsule 1   • amoxicillin-clavulanate (AUGMENTIN) 875-125 MG per tablet Take 1 tablet by mouth 2 (Two) Times a Day. 14 tablet 0   • busPIRone (BUSPAR) 15 MG tablet Take 1 tablet by mouth Daily. 30 tablet 5   • fluconazole (Diflucan) 150 MG tablet Po x1 now and repeat in 3 days 2 tablet 0   • methylPREDNISolone (MEDROL) 4 MG dose pack Take as directed on package instructions. 21 tablet 0   • Pediatric Multivitamins-Iron (FLINTSTONES PLUS IRON PO) Take 2 tablets by mouth Daily.     • promethazine-dextromethorphan (PROMETHAZINE-DM) 6.25-15 MG/5ML syrup Take 5 mL by mouth 4 (Four) Times a Day As Needed for Cough. 120 mL 0     Facility-Administered Medications Prior to Visit   Medication Dose Route Frequency Provider Last Rate Last Admin   • lidocaine (XYLOCAINE) 2% injection 1 mL  1 mL Injection Once Jovita Auguste APRN           Patient Active Problem List   Diagnosis   • Acute post-traumatic stress disorder   • Anxiety   • Benign essential hypertension   • Excessive cerumen in ear canal   • Cough   • Dyslipidemia   • External hemorrhoids   • Gastroesophageal reflux disease   • Hemorrhoids   • Panic disorder without agoraphobia   • Peripheral neuropathy   • Ulcerative colitis (HCC)   • Ulnar neuropathy   • Immunocompromised state (HCC)   • High triglycerides   • Right anterior shoulder pain   • Hypertension   • Ingrown nail   • Pain in limb       Advance Care Planning:  ACP discussion was held with the patient during this visit. Patient does not have an advance directive, information provided.    Identification of Risk Factors:  Risk factors include: Obesity/Overweight .    Review of Systems   Respiratory: Negative for shortness of breath.    Cardiovascular: Negative for chest pain.   Musculoskeletal: Negative for myalgias (resolved off of  "rosuvastatin, tolerating pravastatin).       Compared to one year ago, the patient feels her physical health is the same.  Compared to one year ago, the patient feels her mental health is the same.    Objective     Physical Exam  Vitals and nursing note reviewed.   Constitutional:       Appearance: She is well-developed.   HENT:      Head: Normocephalic and atraumatic.   Neck:      Thyroid: No thyromegaly.      Vascular: No JVD.   Cardiovascular:      Rate and Rhythm: Normal rate and regular rhythm.      Heart sounds: Normal heart sounds. No murmur heard.  No friction rub. No gallop.    Pulmonary:      Effort: Pulmonary effort is normal. No respiratory distress.      Breath sounds: Normal breath sounds. No wheezing or rales.   Abdominal:      General: Bowel sounds are normal. There is no distension.      Palpations: Abdomen is soft.      Tenderness: There is no abdominal tenderness. There is no guarding or rebound.   Musculoskeletal:      Cervical back: Neck supple.   Skin:     General: Skin is warm and dry.   Neurological:      Mental Status: She is alert.      Comments: Fine tremor at rest;  No cog wheel rigidity   Psychiatric:         Behavior: Behavior normal.         Vitals:    11/29/21 1003   BP: 122/74   Pulse: 71   SpO2: 99%   Weight: 67.6 kg (149 lb)   Height: 152.4 cm (60\")   PainSc: 0-No pain       Patient's Body mass index is 29.1 kg/m². indicating that she is overweight (BMI 25-29.9). Obesity-related health conditions include the following: dyslipidemias. Obesity is unchanged. BMI is is above average; BMI management plan is completed. We discussed portion control and increasing exercise..      Assessment/Plan   Patient Self-Management and Personalized Health Advice  The patient has been provided with information about: diet and exercise and preventive services including:   · Annual Wellness Visit (AWV).    Visit Diagnoses:    ICD-10-CM ICD-9-CM   1. Medicare annual wellness visit, subsequent  Z00.00 " V70.0   2. Tremors of nervous system  R25.1 781.0   3. Dyslipidemia  E78.5 272.4   4. Menopause  Z78.0 627.2   5. Flu vaccine need  Z23 V04.81       Orders Placed This Encounter   Procedures   • DEXA Bone Density Axial     Standing Status:   Future     Standing Expiration Date:   11/29/2022     Order Specific Question:   Reason for Exam:     Answer:   osteoporosis screening, postmenopause     Order Specific Question:   Release to patient     Answer:   Immediate   • FluLaval/Fluarix/Fluzone >6 Months   • Lipid Panel   • Ambulatory Referral to Neurology     Referral Priority:   Routine     Referral Type:   Consultation     Referral Reason:   Specialty Services Required     Referred to Provider:   Jovani Ramirez II, MD     Requested Specialty:   Neurology     Number of Visits Requested:   1       Outpatient Encounter Medications as of 11/29/2021   Medication Sig Dispense Refill   • adalimumab (HUMIRA) 40 MG/0.8ML Prefilled Syringe Kit injection Inject 40 mg under the skin 1 (One) Time. Once every 2 weeks     • albuterol sulfate  (90 Base) MCG/ACT inhaler INHALE 2 PUFFS BY MOUTH EVERY 4 HOURS AS NEEDED FOR WHEEZING OR SHORTNESS OF BREATH 8.5 g 5   • cetirizine (zyrTEC) 10 MG tablet TAKE 1 TABLET BY MOUTH DAILY 30 tablet 5   • fluticasone (FLONASE) 50 MCG/ACT nasal spray 2 sprays into the nostril(s) as directed by provider Daily. 16 g 5   • glycopyrrolate (Robinul) 1 MG tablet Take 1 tablet by mouth 2 (Two) Times a Day. 60 tablet 5   • Glycopyrronium Tosylate 2.4 % pads Apply 1 application topically Daily As Needed (hyperhidrosis). 30 each 5   • lisinopril (PRINIVIL,ZESTRIL) 20 MG tablet TAKE 1 TABLET DAILY FOR    BLOOD PRESSURE 90 tablet 3   • omeprazole (priLOSEC) 40 MG capsule TAKE 1 CAPSULE TWICE DAILY 180 capsule 1   • pravastatin (Pravachol) 20 MG tablet Take 1 tablet by mouth Daily. 90 tablet 3   • tiZANidine (ZANAFLEX) 4 MG tablet Take 1 tablet by mouth Every 8 (Eight) Hours As Needed for Muscle Spasms.  30 tablet 5   • traZODone (DESYREL) 50 MG tablet Take 0.5 tablets by mouth Every Night. 30 tablet 1   • venlafaxine (EFFEXOR) 150 MG tablet sustained-release 24 hour 24 hr tablet TAKE 1 TABLET DAILY 90 tablet 1   • venlafaxine XR (EFFEXOR-XR) 75 MG 24 hr capsule TAKE 1 CAPSULE DAILY. TAKE WITH EFFEXOR 150 MG. 90 capsule 1   • Nutritional Supplements (Ensure Original) liquid Take 1 can by mouth 2 (Two) Times a Day. 25891 mL 12   • [DISCONTINUED] amoxicillin-clavulanate (AUGMENTIN) 875-125 MG per tablet Take 1 tablet by mouth 2 (Two) Times a Day. 14 tablet 0   • [DISCONTINUED] busPIRone (BUSPAR) 15 MG tablet Take 1 tablet by mouth Daily. 30 tablet 5   • [DISCONTINUED] fluconazole (Diflucan) 150 MG tablet Po x1 now and repeat in 3 days 2 tablet 0   • [DISCONTINUED] methylPREDNISolone (MEDROL) 4 MG dose pack Take as directed on package instructions. 21 tablet 0   • [DISCONTINUED] Pediatric Multivitamins-Iron (FLINTSTONES PLUS IRON PO) Take 2 tablets by mouth Daily.     • [DISCONTINUED] promethazine-dextromethorphan (PROMETHAZINE-DM) 6.25-15 MG/5ML syrup Take 5 mL by mouth 4 (Four) Times a Day As Needed for Cough. 120 mL 0     Facility-Administered Encounter Medications as of 11/29/2021   Medication Dose Route Frequency Provider Last Rate Last Admin   • lidocaine (XYLOCAINE) 2% injection 1 mL  1 mL Injection Once Jovita Auguste APRN           Reviewed use of high risk medication in the elderly: yes  Reviewed for potential of harmful drug interactions in the elderly: yes    Follow Up:  Return in about 6 months (around 5/29/2022), or if symptoms worsen or fail to improve.     An After Visit Summary and PPPS with all of these plans were given to the patient.           ++++++++++++++++++++++++++++++++++++++++++++++++++++++++++++++++++     Chief Complaint   Patient presents with   • Annual Exam     medicare   • Shaking     head   • Hyperlipidemia     Shaking  This is a chronic problem. The current episode started more  "than 1 month ago. The problem occurs intermittently. The problem has been waxing and waning. Pertinent negatives include no chest pain or myalgias (resolved off of rosuvastatin, tolerating pravastatin). Associated symptoms comments: Noticing tremor in head;  Hands occly, but less often as well.  Not aware of family other than 1 niece    She also has occly myoclonic jerks as well.  Intermittent.  . She has tried nothing for the symptoms. The treatment provided no relief.   Hyperlipidemia  This is a chronic problem. The current episode started more than 1 year ago. The problem is controlled. Pertinent negatives include no chest pain, myalgias (resolved off of rosuvastatin, tolerating pravastatin) or shortness of breath. Current antihyperlipidemic treatment includes statins.     Has foot pain    Review of Systems   Cardiovascular: Negative for chest pain.   Respiratory: Negative for shortness of breath.    Musculoskeletal: Negative for myalgias (resolved off of rosuvastatin, tolerating pravastatin).       Social History     Tobacco Use   • Smoking status: Never Smoker   • Smokeless tobacco: Never Used   Substance Use Topics   • Alcohol use: No     O:   Vitals:    11/29/21 1003   BP: 122/74   Pulse: 71   SpO2: 99%   Weight: 67.6 kg (149 lb)   Height: 152.4 cm (60\")   PainSc: 0-No pain     Body mass index is 29.1 kg/m².  Vitals and nursing note reviewed.   Constitutional:       Appearance: Well-developed.   HENT:      Head: Normocephalic and atraumatic.   Neck:      Thyroid: No thyromegaly.      Vascular: No JVD.   Pulmonary:      Effort: Pulmonary effort is normal. No respiratory distress.      Breath sounds: Normal breath sounds. No wheezing. No rales.   Cardiovascular:      Normal rate. Regular rhythm. Normal heart sounds.      No gallop. No friction rub.   Abdominal:      General: Bowel sounds are normal. There is no distension.      Palpations: Abdomen is soft.      Tenderness: There is no abdominal tenderness. " There is no guarding or rebound.   Musculoskeletal:      Cervical back: Neck supple. Skin:     General: Skin is warm and dry.   Neurological:      Mental Status: Alert.      Comments: Fine tremor at rest;  No cog wheel rigidity   Psychiatric:         Behavior: Behavior normal.         Diagnoses and all orders for this visit:    1. Medicare annual wellness visit, subsequent (Primary)    2. Tremors of nervous system  -     Ambulatory Referral to Neurology    3. Dyslipidemia  -     Lipid Panel    4. Menopause  -     DEXA Bone Density Axial; Future  -     Nutritional Supplements (Ensure Original) liquid; Take 1 can by mouth 2 (Two) Times a Day.  Dispense: 08413 mL; Refill: 12    5. Flu vaccine need  -     FluLaval/Fluarix/Fluzone >6 Months    likely essential tremors, refer to neurology for opinion.  Declines meds for now as can live with  Requests ensure as feels better  -HLD - continue new statin, recheck lipids.  -flu up date today  Due dexa scan      Return in about 6 months (around 5/29/2022), or if symptoms worsen or fail to improve.

## 2021-11-30 LAB
CHOLEST SERPL-MCNC: 372 MG/DL (ref 100–199)
HDLC SERPL-MCNC: 45 MG/DL
LABORATORY COMMENT REPORT: ABNORMAL
LDLC SERPL CALC-MCNC: 244 MG/DL (ref 0–99)
TRIGL SERPL-MCNC: 376 MG/DL (ref 0–149)
VLDLC SERPL CALC-MCNC: 83 MG/DL (ref 5–40)

## 2021-12-02 RX ORDER — DIPHENOXYLATE HYDROCHLORIDE AND ATROPINE SULFATE 2.5; .025 MG/1; MG/1
1 TABLET ORAL DAILY
COMMUNITY
End: 2022-06-03

## 2021-12-02 RX ORDER — VITAMIN B COMPLEX
1 CAPSULE ORAL DAILY
COMMUNITY

## 2021-12-02 RX ORDER — UBIDECARENONE 75 MG
50 CAPSULE ORAL DAILY
COMMUNITY

## 2021-12-03 ENCOUNTER — ON CAMPUS - OUTPATIENT (OUTPATIENT)
Dept: URBAN - METROPOLITAN AREA HOSPITAL 114 | Facility: HOSPITAL | Age: 61
End: 2021-12-03

## 2021-12-03 ENCOUNTER — ANESTHESIA (OUTPATIENT)
Dept: GASTROENTEROLOGY | Facility: HOSPITAL | Age: 61
End: 2021-12-03

## 2021-12-03 ENCOUNTER — HOSPITAL ENCOUNTER (OUTPATIENT)
Facility: HOSPITAL | Age: 61
Setting detail: HOSPITAL OUTPATIENT SURGERY
Discharge: HOME | End: 2021-12-03
Attending: INTERNAL MEDICINE | Admitting: INTERNAL MEDICINE

## 2021-12-03 ENCOUNTER — ANESTHESIA EVENT (OUTPATIENT)
Dept: GASTROENTEROLOGY | Facility: HOSPITAL | Age: 61
End: 2021-12-03

## 2021-12-03 VITALS
RESPIRATION RATE: 16 BRPM | HEIGHT: 60 IN | SYSTOLIC BLOOD PRESSURE: 108 MMHG | WEIGHT: 148 LBS | BODY MASS INDEX: 29.06 KG/M2 | HEART RATE: 82 BPM | OXYGEN SATURATION: 100 % | DIASTOLIC BLOOD PRESSURE: 57 MMHG

## 2021-12-03 DIAGNOSIS — K63.89 OTHER SPECIFIED DISEASES OF INTESTINE: ICD-10-CM

## 2021-12-03 DIAGNOSIS — K57.30 DIVERTICULOSIS OF LARGE INTESTINE WITHOUT PERFORATION OR ABS: ICD-10-CM

## 2021-12-03 DIAGNOSIS — K51.50 LEFT SIDED COLITIS WITHOUT COMPLICATIONS: ICD-10-CM

## 2021-12-03 DIAGNOSIS — K51.90 ULCERATIVE COLITIS (HCC): ICD-10-CM

## 2021-12-03 PROCEDURE — 45380 COLONOSCOPY AND BIOPSY: CPT | Performed by: INTERNAL MEDICINE

## 2021-12-03 PROCEDURE — 25010000002 PROPOFOL 10 MG/ML EMULSION: Performed by: ANESTHESIOLOGY

## 2021-12-03 PROCEDURE — 88305 TISSUE EXAM BY PATHOLOGIST: CPT | Performed by: INTERNAL MEDICINE

## 2021-12-03 RX ORDER — PROPOFOL 10 MG/ML
VIAL (ML) INTRAVENOUS AS NEEDED
Status: DISCONTINUED | OUTPATIENT
Start: 2021-12-03 | End: 2021-12-03 | Stop reason: SURG

## 2021-12-03 RX ORDER — HYDROMORPHONE HCL 110MG/55ML
0.5 PATIENT CONTROLLED ANALGESIA SYRINGE INTRAVENOUS
Status: DISCONTINUED | OUTPATIENT
Start: 2021-12-03 | End: 2021-12-03 | Stop reason: HOSPADM

## 2021-12-03 RX ORDER — PROMETHAZINE HYDROCHLORIDE 25 MG/1
25 SUPPOSITORY RECTAL ONCE AS NEEDED
Status: DISCONTINUED | OUTPATIENT
Start: 2021-12-03 | End: 2021-12-03 | Stop reason: HOSPADM

## 2021-12-03 RX ORDER — FLUMAZENIL 0.1 MG/ML
0.2 INJECTION INTRAVENOUS AS NEEDED
Status: DISCONTINUED | OUTPATIENT
Start: 2021-12-03 | End: 2021-12-03 | Stop reason: HOSPADM

## 2021-12-03 RX ORDER — ONDANSETRON 2 MG/ML
4 INJECTION INTRAMUSCULAR; INTRAVENOUS ONCE AS NEEDED
Status: DISCONTINUED | OUTPATIENT
Start: 2021-12-03 | End: 2021-12-03 | Stop reason: HOSPADM

## 2021-12-03 RX ORDER — OXYCODONE AND ACETAMINOPHEN 7.5; 325 MG/1; MG/1
1 TABLET ORAL EVERY 4 HOURS PRN
Status: DISCONTINUED | OUTPATIENT
Start: 2021-12-03 | End: 2021-12-03 | Stop reason: HOSPADM

## 2021-12-03 RX ORDER — FENTANYL CITRATE 50 UG/ML
50 INJECTION, SOLUTION INTRAMUSCULAR; INTRAVENOUS
Status: DISCONTINUED | OUTPATIENT
Start: 2021-12-03 | End: 2021-12-03 | Stop reason: HOSPADM

## 2021-12-03 RX ORDER — LIDOCAINE HYDROCHLORIDE 20 MG/ML
INJECTION, SOLUTION INFILTRATION; PERINEURAL AS NEEDED
Status: DISCONTINUED | OUTPATIENT
Start: 2021-12-03 | End: 2021-12-03 | Stop reason: SURG

## 2021-12-03 RX ORDER — SODIUM CHLORIDE 0.9 % (FLUSH) 0.9 %
10 SYRINGE (ML) INJECTION AS NEEDED
Status: DISCONTINUED | OUTPATIENT
Start: 2021-12-03 | End: 2021-12-03 | Stop reason: HOSPADM

## 2021-12-03 RX ORDER — HYDRALAZINE HYDROCHLORIDE 20 MG/ML
5 INJECTION INTRAMUSCULAR; INTRAVENOUS
Status: DISCONTINUED | OUTPATIENT
Start: 2021-12-03 | End: 2021-12-03 | Stop reason: HOSPADM

## 2021-12-03 RX ORDER — SODIUM CHLORIDE 0.9 % (FLUSH) 0.9 %
3 SYRINGE (ML) INJECTION EVERY 12 HOURS SCHEDULED
Status: DISCONTINUED | OUTPATIENT
Start: 2021-12-03 | End: 2021-12-03 | Stop reason: HOSPADM

## 2021-12-03 RX ORDER — DIPHENHYDRAMINE HCL 25 MG
25 CAPSULE ORAL
Status: DISCONTINUED | OUTPATIENT
Start: 2021-12-03 | End: 2021-12-03 | Stop reason: HOSPADM

## 2021-12-03 RX ORDER — IBUPROFEN 600 MG/1
600 TABLET ORAL ONCE AS NEEDED
Status: DISCONTINUED | OUTPATIENT
Start: 2021-12-03 | End: 2021-12-03 | Stop reason: HOSPADM

## 2021-12-03 RX ORDER — EPHEDRINE SULFATE 50 MG/ML
5 INJECTION, SOLUTION INTRAVENOUS ONCE AS NEEDED
Status: DISCONTINUED | OUTPATIENT
Start: 2021-12-03 | End: 2021-12-03 | Stop reason: HOSPADM

## 2021-12-03 RX ORDER — PROMETHAZINE HYDROCHLORIDE 25 MG/1
25 TABLET ORAL ONCE AS NEEDED
Status: DISCONTINUED | OUTPATIENT
Start: 2021-12-03 | End: 2021-12-03 | Stop reason: HOSPADM

## 2021-12-03 RX ORDER — LABETALOL HYDROCHLORIDE 5 MG/ML
5 INJECTION, SOLUTION INTRAVENOUS
Status: DISCONTINUED | OUTPATIENT
Start: 2021-12-03 | End: 2021-12-03 | Stop reason: HOSPADM

## 2021-12-03 RX ORDER — HYDROCODONE BITARTRATE AND ACETAMINOPHEN 7.5; 325 MG/1; MG/1
1 TABLET ORAL ONCE AS NEEDED
Status: DISCONTINUED | OUTPATIENT
Start: 2021-12-03 | End: 2021-12-03 | Stop reason: HOSPADM

## 2021-12-03 RX ORDER — DIPHENHYDRAMINE HYDROCHLORIDE 50 MG/ML
12.5 INJECTION INTRAMUSCULAR; INTRAVENOUS
Status: DISCONTINUED | OUTPATIENT
Start: 2021-12-03 | End: 2021-12-03 | Stop reason: HOSPADM

## 2021-12-03 RX ORDER — SODIUM CHLORIDE, SODIUM LACTATE, POTASSIUM CHLORIDE, CALCIUM CHLORIDE 600; 310; 30; 20 MG/100ML; MG/100ML; MG/100ML; MG/100ML
30 INJECTION, SOLUTION INTRAVENOUS CONTINUOUS PRN
Status: DISCONTINUED | OUTPATIENT
Start: 2021-12-03 | End: 2021-12-03 | Stop reason: HOSPADM

## 2021-12-03 RX ORDER — NALOXONE HCL 0.4 MG/ML
0.2 VIAL (ML) INJECTION AS NEEDED
Status: DISCONTINUED | OUTPATIENT
Start: 2021-12-03 | End: 2021-12-03 | Stop reason: HOSPADM

## 2021-12-03 RX ADMIN — PROPOFOL 120 MCG/KG/MIN: 10 INJECTION, EMULSION INTRAVENOUS at 11:00

## 2021-12-03 RX ADMIN — SODIUM CHLORIDE, POTASSIUM CHLORIDE, SODIUM LACTATE AND CALCIUM CHLORIDE 30 ML/HR: 600; 310; 30; 20 INJECTION, SOLUTION INTRAVENOUS at 10:17

## 2021-12-03 RX ADMIN — LIDOCAINE HYDROCHLORIDE 100 MG: 20 INJECTION, SOLUTION INFILTRATION; PERINEURAL at 10:58

## 2021-12-03 RX ADMIN — PROPOFOL 100 MG: 10 INJECTION, EMULSION INTRAVENOUS at 10:58

## 2021-12-03 NOTE — ANESTHESIA PREPROCEDURE EVALUATION
" Anesthesia Evaluation     Patient summary reviewed and Nursing notes reviewed   history of anesthetic complications:  NPO Solid Status: > 8 hours  NPO Liquid Status: > 2 hours           Airway   Mallampati: II  Dental      Pulmonary - negative pulmonary ROS   Cardiovascular   Exercise tolerance: good (4-7 METS)    (+) hypertension, hyperlipidemia,       Neuro/Psych  (+) numbness, psychiatric history Anxiety,     GI/Hepatic/Renal/Endo    (+)  GERD, GI bleeding ,     Musculoskeletal (-) negative ROS    Abdominal    Substance History - negative use     OB/GYN negative ob/gyn ROS         Other                        Anesthesia Plan    ASA 2     MAC   (/65 (BP Location: Left arm, Patient Position: Lying)   Pulse 89   Resp 16   Ht 152.4 cm (60\")   Wt 67.1 kg (148 lb)   SpO2 98%   BMI 28.90 kg/m²     I have reviewed the patient's history with the patient and the chart, including all pertinent laboratory results and imaging. I have explained the risks of anesthesia including but not limited to dental damage, corneal abrasion, nerve injury, MI, stroke, and death.    )    Anesthetic plan, all risks, benefits, and alternatives have been provided, discussed and informed consent has been obtained with: patient.      "

## 2021-12-03 NOTE — ANESTHESIA POSTPROCEDURE EVALUATION
"Patient: Marizol Park    Procedure Summary     Date: 12/03/21 Room / Location:  [facility]    Anesthesia Start: 1055 Anesthesia Stop: 1118    Procedure: COLONOSCOPY TO TERMINAL ILEUM WITH BX (N/A ) Diagnosis:     Surgeons: Randal Demarco MD Provider: Sonia Grullon MD    Anesthesia Type: MAC ASA Status: 2          Anesthesia Type: MAC    Vitals  No vitals data found for the desired time range.          Post Anesthesia Care and Evaluation    Patient location during evaluation: bedside  Patient participation: complete - patient participated  Level of consciousness: awake  Pain management: adequate  Airway patency: patent  Anesthetic complications: No anesthetic complications  PONV Status: controlled  Cardiovascular status: acceptable  Respiratory status: acceptable  Hydration status: acceptable    Comments: /65 (BP Location: Left arm, Patient Position: Lying)   Pulse 89   Resp 16   Ht 152.4 cm (60\")   Wt 67.1 kg (148 lb)   SpO2 98%   BMI 28.90 kg/m²         "

## 2021-12-03 NOTE — H&P
[facility]   HISTORY AND PHYSICAL    Patient Name: Marizol Park  : 1960  MRN: [id number]  Primary Care Physician:  Ras Mcconnell DO  Date of admission: 12/3/2021    Subjective   Subjective     Chief Complaint: ulcerative colitis    History of Present Illness  She has been doing fine with her colitis some issues with burning pain at night that pepcid helps  Review of Systems   Gastrointestinal: Positive for abdominal pain.   All other systems reviewed and are negative.       Personal History     Past Medical History:   Diagnosis Date   • Acid reflux    • Anemia associated with acute blood loss    • Anxiety    • Cancer (HCC)     SKIN   • High cholesterol    • High triglycerides    • History of transfusion    • Hypertension    • Rectal bleeding    • Ulcerative colitis (HCC)        Past Surgical History:   Procedure Laterality Date   • COLONOSCOPY     • ENDOSCOPY N/A 2017    Procedure: ESOPHAGOGASTRODUODENOSCOPY WITH BX AND PALACIO DILATION 53 [place];  Surgeon: Randal Demarco MD;  Location: [facility] ENDOSCOPY;  Service:    • EYE SURGERY Bilateral     CATARACTS   • SIGMOIDOSCOPY N/A 2018    Procedure: FLEX SIGMOIDOSCOPY TO 50CM WITH COLD BIOPSIES;  Surgeon: Randal Demarco MD;  Location: [facility] ENDOSCOPY;  Service:        Family History: family history includes COPD in her mother; Cancer in an other family member; Heart attack in an other family member; Other in her father. Otherwise pertinent FHx was reviewed and not pertinent to current issue.    Social History:  reports that she has never smoked. She has never used smokeless tobacco. She reports that she does not drink alcohol and does not use drugs.    Home Medications:  Apoaequorin, Ensure Original, Glycopyrronium Tosylate, adalimumab, albuterol sulfate HFA, cetirizine, fluticasone, lisinopril, multivitamin, omeprazole, pravastatin, tiZANidine, traZODone, venlafaxine, venlafaxine XR, vitamin B-12, and vitamin b  complex    Allergies:  Allergies   Allergen Reactions   • Remicade [Infliximab] Hives and Swelling     Facial swelling, hives wide spread   • Sulfa Antibiotics Hives       Objective    Objective     Vitals:   Heart Rate:  [89] 89  Resp:  [16] 16  BP: (122)/(65) 122/65    Physical Exam  HENT:      Right Ear: External ear normal.      Left Ear: External ear normal.      Mouth/Throat:      Pharynx: Oropharynx is clear.   Eyes:      Conjunctiva/sclera: Conjunctivae normal.   Cardiovascular:      Rate and Rhythm: Normal rate.      Pulses: Normal pulses.   Pulmonary:      Effort: Pulmonary effort is normal.   Abdominal:      General: Abdomen is flat.   Skin:     General: Skin is warm and dry.   Neurological:      General: No focal deficit present.      Mental Status: She is alert.   Psychiatric:         Mood and Affect: Mood normal.         Result Review    Result Review:  I have personally reviewed the results from the time of this admission to 12/3/2021 10:59 EST and agree with these findings:  []  Laboratory  []  Microbiology  []  Radiology  []  EKG/Telemetry   []  Cardiology/Vascular   []  Pathology  []  Old records  []  Other:    Assessment/Plan   Assessment / Plan     Brief Patient Summary:  Marizol Park is a 61 y.o. female   Ulcerative colitis  Nocturnal acid breakthrough    Active Hospital Problems:  There are no active hospital problems to display for this patient.    Plan: Colonoscopy risks, alternatives and benefits discussed with patient and the patient is agreeable to having procedure done.  Added famotidine at bedtime       DVT prophylaxis:  No DVT prophylaxis order currently exists.    CODE STATUS:       Admission Status:  I believe this patient meets out patient status.    Randal Demarco MD

## 2021-12-06 LAB
LAB AP CASE REPORT: NORMAL
PATH REPORT.FINAL DX SPEC: NORMAL
PATH REPORT.GROSS SPEC: NORMAL

## 2022-02-04 RX ORDER — CETIRIZINE HYDROCHLORIDE 10 MG/1
10 TABLET ORAL DAILY
Qty: 30 TABLET | Refills: 5 | Status: SHIPPED | OUTPATIENT
Start: 2022-02-04 | End: 2022-05-23 | Stop reason: SDUPTHER

## 2022-02-15 DIAGNOSIS — F41.9 ANXIETY: ICD-10-CM

## 2022-02-15 RX ORDER — OMEPRAZOLE 40 MG/1
40 CAPSULE, DELAYED RELEASE ORAL 2 TIMES DAILY
Qty: 180 CAPSULE | Refills: 1 | Status: SHIPPED | OUTPATIENT
Start: 2022-02-15 | End: 2022-09-01 | Stop reason: SDUPTHER

## 2022-02-15 RX ORDER — VENLAFAXINE HYDROCHLORIDE 75 MG/1
CAPSULE, EXTENDED RELEASE ORAL
Qty: 90 CAPSULE | Refills: 1 | Status: SHIPPED | OUTPATIENT
Start: 2022-02-15 | End: 2022-09-01 | Stop reason: SDUPTHER

## 2022-02-15 RX ORDER — VENLAFAXINE HYDROCHLORIDE 150 MG/1
150 TABLET, EXTENDED RELEASE ORAL DAILY
Qty: 90 TABLET | Refills: 1 | Status: SHIPPED | OUTPATIENT
Start: 2022-02-15 | End: 2022-02-22 | Stop reason: SDUPTHER

## 2022-02-18 ENCOUNTER — TELEPHONE (OUTPATIENT)
Dept: FAMILY MEDICINE CLINIC | Facility: CLINIC | Age: 62
End: 2022-02-18

## 2022-02-18 NOTE — TELEPHONE ENCOUNTER
Caller: Marizol Park    Relationship: Self    Best call back number: [phone] (H)    What medication are you requesting: SOMETHING FOR A COLD - AMOXICILLIN     What are your current symptoms: LITTLE GREEN MUCUS- HEADACHE    How long have you been experiencing symptoms:  LAST NIGHT    Have you had these symptoms before:    [x] Yes  [] No    Have you been treated for these symptoms before:   [x] Yes  [] No    If a prescription is needed, what is your preferred pharmacy and phone number:  [id number] - [facility] [address] AT [facility] [phone] [facility] [phone] [place]        Additional notes:        THANKS

## 2022-02-21 RX ORDER — AMOXICILLIN 500 MG/1
500 CAPSULE ORAL 3 TIMES DAILY
Qty: 30 CAPSULE | Refills: 0 | Status: SHIPPED | OUTPATIENT
Start: 2022-02-21 | End: 2022-06-03

## 2022-02-21 NOTE — TELEPHONE ENCOUNTER
If not already tested for covid 19 I would as causes sinus symptoms very commonaly.  I did send something in.  RRJ

## 2022-02-22 DIAGNOSIS — F41.9 ANXIETY: ICD-10-CM

## 2022-02-22 RX ORDER — VENLAFAXINE HYDROCHLORIDE 150 MG/1
150 TABLET, EXTENDED RELEASE ORAL DAILY
Qty: 90 TABLET | Refills: 1 | Status: SHIPPED | OUTPATIENT
Start: 2022-02-22 | End: 2022-09-06 | Stop reason: DRUGHIGH

## 2022-02-23 ENCOUNTER — TELEPHONE (OUTPATIENT)
Dept: FAMILY MEDICINE CLINIC | Facility: CLINIC | Age: 62
End: 2022-02-23

## 2022-02-23 NOTE — TELEPHONE ENCOUNTER
PHARMACY IS CALLING IN ASKING FOR A CALL BACK.  THEY ARE NEEDING CLARIFICATION ON THE MEDICATION venlafaxine (EFFEXOR) 150 MG tablet sustained-release 24 hour 24 hr tablet AND venlafaxine XR (EFFEXOR-XR) 75 MG 24 hr capsule.  THEY ARE ATTEMPTING TO DETERMINE IF THE PATIENT IS TAKING BOTH FORMS OF THIS MEDICATION AS WELL AS THE INSTRUCTIONS  AND DOSING     PLEASE ADVISE    [id number]   REF # [id number]

## 2022-02-24 ENCOUNTER — TELEPHONE (OUTPATIENT)
Dept: FAMILY MEDICINE CLINIC | Facility: CLINIC | Age: 62
End: 2022-02-24

## 2022-02-24 NOTE — TELEPHONE ENCOUNTER
Pharmacy Name: [facility]     Pharmacy representative name: LEVI    Pharmacy representative phone number:[phone]    What medication are you calling in regards to: VENLAFAXINE    What question does the pharmacy have:     Who is the provider that prescribed the medication:     Additional notes: LEVI WITH [facility] IS CALLING IN TO GET INSTRUCTIONS ON A PTS VENLAFAXINE XR.  I CALL THE OFFICE AND WAS ASKED TO HOLD.  LEVI NEEDS TO BE CALLED BACK AT  1 [phone] AS SHE HAS QUESTIONS ABOUT THIS WITH THE REFERENCE NUMBER OF [phone]

## 2022-03-22 ENCOUNTER — TELEPHONE (OUTPATIENT)
Dept: FAMILY MEDICINE CLINIC | Facility: CLINIC | Age: 62
End: 2022-03-22

## 2022-03-22 NOTE — TELEPHONE ENCOUNTER
Caller: Marizol Park    Relationship to patient: Self    Best call back number: [phone]     Patient is needing: PATIENT WANTED TO KNOW NAMES OF THERAPISTS THAT  PREFERS PLEASE SEND LIST VIA MY CHART .  PLEASE CALL PATIENT AND ADVISE .

## 2022-04-19 ENCOUNTER — TELEPHONE (OUTPATIENT)
Dept: FAMILY MEDICINE CLINIC | Facility: CLINIC | Age: 62
End: 2022-04-19

## 2022-04-19 NOTE — TELEPHONE ENCOUNTER
PATIENT CALLED STATING THAT THE INSURANCE WAS  BILLED INCORRECTLY FOR THE 1-3-22 VISIT     THE MEDICARE IS SECONDARY AND ANTH IS PRIMARY     CAN THIS BE RE-BILLED       PLEASE ADVISE   Marizol Park (Self) [phone] (H)

## 2022-05-13 ENCOUNTER — TELEPHONE (OUTPATIENT)
Dept: FAMILY MEDICINE CLINIC | Facility: CLINIC | Age: 62
End: 2022-05-13

## 2022-05-13 NOTE — TELEPHONE ENCOUNTER
Caller: Marizol Park    Relationship: Self    Best call back number: [phone]    What was the call regarding: PATIENT IS CALLING IN REGARDS TO HER MEDICATION, tiZANidine (ZANAFLEX) 4 MG tablet. PATIENT STATED THAT THEY ARE HELPING WITH HER BACK PAIN AND SHE IS TAKING THEM ABOUT ONE TO TWO TIMES A WEEK. PATIENT IS CONCERNED ABOUT GETTING ADDICTED TO THEM. PATIENT IS ALSO CONCERNED THAT THERE IS MORE GOING ON WITH HER BACK THAN JUST BACK PAIN. PATIENT STATED SHE HAS A BONE DENSITY TEST COMING UP AND WANTED TO KNOW IF SHE SHOULD WAIT UNTIL SHE GETS THE RESULTS FROM THAT OR IF SHE SHOULD HAVE HER BACK CHECKED OUT BEFORE THEN. PLEASE ADVISE.     Do you require a callback: YES

## 2022-05-13 NOTE — TELEPHONE ENCOUNTER
Tizanidine are not addictive and she can take.  She take prior to dexa scan as well.  If helping then likely back pain.  If has bowel issues again or urinary trouble then may be.  IF getting worse, we may want to image. The dexa will tell us if fractures in the back and bone density.  Osteoporosis or osteopenia does not have any symptoms unless breaks something.  RRJ

## 2022-05-20 ENCOUNTER — OFFICE (OUTPATIENT)
Dept: URBAN - METROPOLITAN AREA CLINIC 65 | Facility: CLINIC | Age: 62
End: 2022-05-20

## 2022-05-20 VITALS
HEART RATE: 89 BPM | HEIGHT: 60 IN | SYSTOLIC BLOOD PRESSURE: 120 MMHG | DIASTOLIC BLOOD PRESSURE: 72 MMHG | WEIGHT: 151 LBS

## 2022-05-20 DIAGNOSIS — K51.90 ULCERATIVE COLITIS, UNSPECIFIED, WITHOUT COMPLICATIONS: ICD-10-CM

## 2022-05-20 PROCEDURE — 99214 OFFICE O/P EST MOD 30 MIN: CPT | Performed by: INTERNAL MEDICINE

## 2022-05-23 DIAGNOSIS — J30.9 ALLERGIC RHINITIS, UNSPECIFIED SEASONALITY, UNSPECIFIED TRIGGER: Primary | ICD-10-CM

## 2022-05-23 DIAGNOSIS — E78.5 DYSLIPIDEMIA: ICD-10-CM

## 2022-05-23 RX ORDER — PRAVASTATIN SODIUM 20 MG
20 TABLET ORAL DAILY
Qty: 90 TABLET | Refills: 0 | Status: SHIPPED | OUTPATIENT
Start: 2022-05-23 | End: 2022-06-06

## 2022-05-23 RX ORDER — CETIRIZINE HYDROCHLORIDE 10 MG/1
10 TABLET ORAL DAILY
Qty: 90 TABLET | Refills: 0 | Status: SHIPPED | OUTPATIENT
Start: 2022-05-23

## 2022-05-26 ENCOUNTER — HOSPITAL ENCOUNTER (OUTPATIENT)
Dept: BONE DENSITY | Facility: HOSPITAL | Age: 62
Discharge: HOME | End: 2022-05-26
Admitting: FAMILY MEDICINE

## 2022-05-26 DIAGNOSIS — Z78.0 MENOPAUSE: ICD-10-CM

## 2022-05-26 PROCEDURE — 77080 DXA BONE DENSITY AXIAL: CPT

## 2022-06-03 ENCOUNTER — OFFICE VISIT (OUTPATIENT)
Dept: FAMILY MEDICINE CLINIC | Facility: CLINIC | Age: 62
End: 2022-06-03

## 2022-06-03 ENCOUNTER — TELEPHONE (OUTPATIENT)
Dept: FAMILY MEDICINE CLINIC | Facility: CLINIC | Age: 62
End: 2022-06-03

## 2022-06-03 VITALS
WEIGHT: 152 LBS | HEIGHT: 60 IN | RESPIRATION RATE: 20 BRPM | OXYGEN SATURATION: 96 % | BODY MASS INDEX: 29.84 KG/M2 | SYSTOLIC BLOOD PRESSURE: 128 MMHG | DIASTOLIC BLOOD PRESSURE: 60 MMHG | HEART RATE: 82 BPM

## 2022-06-03 DIAGNOSIS — R61 HYPERHIDROSIS: ICD-10-CM

## 2022-06-03 DIAGNOSIS — M85.89 OSTEOPENIA OF MULTIPLE SITES: ICD-10-CM

## 2022-06-03 DIAGNOSIS — I10 BENIGN ESSENTIAL HYPERTENSION: ICD-10-CM

## 2022-06-03 DIAGNOSIS — E66.3 OVERWEIGHT (BMI 25.0-29.9): ICD-10-CM

## 2022-06-03 DIAGNOSIS — E78.5 DYSLIPIDEMIA: ICD-10-CM

## 2022-06-03 DIAGNOSIS — F41.1 GAD (GENERALIZED ANXIETY DISORDER): Primary | ICD-10-CM

## 2022-06-03 DIAGNOSIS — K51.30 ULCERATIVE RECTOSIGMOIDITIS WITHOUT COMPLICATION: ICD-10-CM

## 2022-06-03 PROCEDURE — 99214 OFFICE O/P EST MOD 30 MIN: CPT | Performed by: FAMILY MEDICINE

## 2022-06-03 RX ORDER — MULTIPLE VITAMINS W/ MINERALS TAB 9MG-400MCG
1 TAB ORAL DAILY
COMMUNITY

## 2022-06-03 NOTE — PATIENT INSTRUCTIONS
"\"2-5-6-Almost None!\"  Healthy Habits Start Early    EAT 5 OR MORE SERVINGS OF VEGETABLES AND FRUITS EVERY DAY.    Help Marizol get three vegetables and two fruits each day. Red, green, yellow, orange...encourage them to try all the colors so they can enjoy different flavors and get more vitamins.    How can I help Marizol do this?  ---------------------------------------------  -BE PATIENT WITH Marizol, remember it may take 10 times before they start to like new food. So, start with small bites and just keep trying.  -Serve at least one vegetable or fruit at every meal. Even try two. Remember, portions do not have to be as big as you think.  -Encourage eating fruits and vegetables instead of drinking them..it's a better way to get fiber and vitamins..so limit the amount of juice to 1/2 cup per day for children 1-6 years and one cup per day for children 7-18 years of age. Try using 1/2 part water and 1/2 part juice.    Spend less than two hours per day watching television and other screen media. Screen media includes video games, movies and computer use for entertainment.    How can I help Marizol do this?  -Turn off the TV at dinner. Dinner is the best time to hang out with your kids and just talk, learn about their day, and tell them about your day. Your kids have a lot to learn from you and dinner is a great time to share.  "

## 2022-06-03 NOTE — PROGRESS NOTES
Marek Park is a 61 y.o. female. Presents today for   Chief Complaint   Patient presents with   • Hypertension   • Anxiety   • ulcerfative colitis   • Hyperlipidemia       Hypertension  This is a chronic problem. The current episode started more than 1 year ago. The problem is unchanged. The problem is controlled. Associated symptoms include anxiety. Pertinent negatives include no chest pain, orthopnea, palpitations, peripheral edema, PND or shortness of breath. The current treatment provides moderate improvement.   Anxiety  Presents for follow-up visit. Patient reports no chest pain, palpitations or shortness of breath.       Hyperlipidemia  This is a chronic problem. The current episode started more than 1 year ago. The problem is uncontrolled. Recent lipid tests were reviewed and are high. Pertinent negatives include no chest pain or shortness of breath. Current antihyperlipidemic treatment includes statins.     UC has been stable    Review of Systems   Respiratory: Negative for shortness of breath.    Cardiovascular: Negative for chest pain, palpitations, orthopnea and PND.   Gastrointestinal: Negative for abdominal pain and blood in stool.       Patient Active Problem List   Diagnosis   • Acute post-traumatic stress disorder   • Anxiety   • Benign essential hypertension   • Excessive cerumen in ear canal   • Cough   • Dyslipidemia   • External hemorrhoids   • Gastroesophageal reflux disease   • Hemorrhoids   • Panic disorder without agoraphobia   • Peripheral neuropathy   • Ulcerative colitis (HCC)   • Ulnar neuropathy   • Immunocompromised state (HCC)   • High triglycerides   • Right anterior shoulder pain   • Hypertension   • Ingrown nail   • Pain in limb       Social History     Socioeconomic History   • Marital status:    Tobacco Use   • Smoking status: Never Smoker   • Smokeless tobacco: Never Used   Vaping Use   • Vaping Use: Never used   Substance and Sexual Activity   • Alcohol use:  No   • Drug use: No   • Sexual activity: Never       Allergies   Allergen Reactions   • Remicade [Infliximab] Hives and Swelling     Facial swelling, hives wide spread   • Sulfa Antibiotics Hives       Current Outpatient Medications on File Prior to Visit   Medication Sig Dispense Refill   • adalimumab (HUMIRA) 40 MG/0.8ML Prefilled Syringe Kit injection Inject 40 mg under the skin into the appropriate area as directed 1 (One) Time. Once every 2 weeks     • albuterol sulfate  (90 Base) MCG/ACT inhaler INHALE 2 PUFFS BY MOUTH EVERY 4 HOURS AS NEEDED FOR WHEEZING OR SHORTNESS OF BREATH 8.5 g 5   • Apoaequorin (Prevagen) 10 MG capsule Take  by mouth.     • B Complex Vitamins (vitamin b complex) capsule capsule Take 1 capsule by mouth Daily.     • calcium citrate-vitamin d (CITRACAL) 200-250 MG-UNIT tablet tablet Take  by mouth Daily.     • cetirizine (zyrTEC) 10 MG tablet Take 1 tablet by mouth Daily. 90 tablet 0   • fluticasone (FLONASE) 50 MCG/ACT nasal spray 2 sprays into the nostril(s) as directed by provider Daily. 16 g 5   • Glycopyrronium Tosylate 2.4 % pads Apply 1 application topically Daily As Needed (hyperhidrosis). 30 each 5   • lisinopril (PRINIVIL,ZESTRIL) 20 MG tablet TAKE 1 TABLET DAILY FOR    BLOOD PRESSURE 90 tablet 3   • multivitamin with minerals (CENTRUM SILVER PO) Take 1 tablet by mouth Daily.     • Nutritional Supplements (Ensure Original) liquid Take 1 can by mouth 2 (Two) Times a Day. 77560 mL 12   • omeprazole (priLOSEC) 40 MG capsule Take 1 capsule by mouth 2 (Two) Times a Day. 180 capsule 1   • pravastatin (Pravachol) 20 MG tablet Take 1 tablet by mouth Daily. 90 tablet 0   • tiZANidine (ZANAFLEX) 4 MG tablet Take 1 tablet by mouth Every 8 (Eight) Hours As Needed for Muscle Spasms. 30 tablet 5   • traZODone (DESYREL) 50 MG tablet Take 0.5 tablets by mouth Every Night. (Patient taking differently: Take 50 mg by mouth As Needed.) 30 tablet 1   • venlafaxine (EFFEXOR) 150 MG tablet  "sustained-release 24 hour 24 hr tablet Take 1 tablet by mouth Daily. 90 tablet 1   • venlafaxine XR (EFFEXOR-XR) 75 MG 24 hr capsule TAKE 1 CAPSULE DAILY. TAKE WITH EFFEXOR 150 MG. 90 capsule 1   • vitamin B-12 (CYANOCOBALAMIN) 100 MCG tablet Take 50 mcg by mouth Daily.     • [DISCONTINUED] amoxicillin (AMOXIL) 500 MG capsule Take 1 capsule by mouth 3 (Three) Times a Day. 30 capsule 0   • [DISCONTINUED] multivitamin (THERAGRAN) tablet tablet Take 1 tablet by mouth Daily.       Current Facility-Administered Medications on File Prior to Visit   Medication Dose Route Frequency Provider Last Rate Last Admin   • lidocaine (XYLOCAINE) 2% injection 1 mL  1 mL Injection Once Kalyani, ELISABETH Vazquez           Objective   Vitals:    06/03/22 0815   BP: 128/60   BP Location: Left arm   Patient Position: Sitting   Cuff Size: Adult   Pulse: 82   Resp: 20   SpO2: 96%   Weight: 68.9 kg (152 lb)   Height: 152.4 cm (60\")   PainSc: 0-No pain     Body mass index is 29.69 kg/m².    Physical Exam  Vitals and nursing note reviewed.   Constitutional:       Appearance: She is well-developed.   HENT:      Head: Normocephalic and atraumatic.   Neck:      Thyroid: No thyromegaly.      Vascular: No JVD.   Cardiovascular:      Rate and Rhythm: Normal rate and regular rhythm.      Heart sounds: Normal heart sounds. No murmur heard.    No friction rub. No gallop.   Pulmonary:      Effort: Pulmonary effort is normal. No respiratory distress.      Breath sounds: Normal breath sounds. No wheezing or rales.   Abdominal:      General: Bowel sounds are normal. There is no distension.      Palpations: Abdomen is soft.      Tenderness: There is no abdominal tenderness. There is no guarding or rebound.   Musculoskeletal:      Cervical back: Neck supple.   Skin:     General: Skin is warm and dry.   Neurological:      Mental Status: She is alert.   Psychiatric:         Behavior: Behavior normal.       Study Result    Narrative & Impression   BONE MINERAL " DENSITOMETRY     HISTORY: Postmenopausal     COMPARISON: None     TECHNIQUE: The bone mineral density was determined using a dual-energy  x-ray source. Fracture risk is related to the absolute bone density and  is expressed as compared to a young gender-matched population  representative of the main peak bone density. Fracture risk is not  calculated for patients with osteoporosis or patients receiving  treatment for osteoporosis.      For postmenopausal women, men over age 65, and for men age 50-65 with  other risk factors, the WHO defines osteopenia is greater than -2.5 and  less than -1.0 standard deviations below peak BMD [T score] and  osteoporosis as 2.5 standard deviations or more below peak BMD. The risk  of osteoporotic fracture doubles for each standard deviation below peak  BMD. For premenopausal women, men under age 50, and for children, Z  scores are used to compare bone density to age-matched controls. The  diagnosis of osteoporosis in these populations requires clinical  assessment of additional risk factors.     Note that the lumbar bone density may be artificially elevated due to  vertebral compression fracture, degenerative disc disease/osteophyte  formation and sclerosis, aortic calcification. Femoral BMD could be  falsely elevated in the setting of degenerative change/arthritis.     FINDINGS:   LUMBAR SPINE:  The BMD measured in the L2, L3, L4 is 1.066 g/cm2 for a  T-score of -1.1 and a Z-score of 1.5     LEFT HIP: The BMD for the femoral neck is 0.642g/cm2 for a T score of   -1.9 and a Z score of -0.5     RIGHT HIP:  The BMD for the femoral neck is 0.666g/cm2 for a T score of  -1.6 and a Z score of -0.2     IMPRESSION:  1. Osteopenia.  2. The 10-year FRAX (World Health Organization) fracture risk for a  major osteoporotic fracture is 9.4% and for a hip fracture is 1.1%.         Current recommendations are that a follow-up bone density be obtained at  an interval of not less than 2 years except  in specific circumstances,  such as after initiation or change of therapy.     This report was finalized on 5/26/2022 3:04 PM by Dr. Felix Shea M.D.          Assessment & Plan   Diagnoses and all orders for this visit:    1. AUREA (generalized anxiety disorder) (Primary)    2. Benign essential hypertension  -     Comprehensive Metabolic Panel    3. Ulcerative rectosigmoiditis without complication (HCC)  -     Comprehensive Metabolic Panel  -     CBC & Differential  -     C-reactive Protein  -     Sedimentation Rate    4. Dyslipidemia  -     Lipid Panel    5. Overweight (BMI 25.0-29.9)    6. Osteopenia of multiple sites        uC - on humira, check labs  -HLD - continue statin, recheck lipids.  -hypertension - controlled, continue medications  -aurea stable on meds, contineu  No meds yet for oteopenia - recommend weight bearing exercise, calcium through diet and vitamin D  Work on weight loss       -Follow up: 6 months and prn

## 2022-06-04 LAB
ALBUMIN SERPL-MCNC: 4 G/DL (ref 3.8–4.8)
ALBUMIN/GLOB SERPL: 1.3 {RATIO} (ref 1.2–2.2)
ALP SERPL-CCNC: 99 IU/L (ref 44–121)
ALT SERPL-CCNC: 43 IU/L (ref 0–32)
AST SERPL-CCNC: 40 IU/L (ref 0–40)
BASOPHILS # BLD AUTO: 0 X10E3/UL (ref 0–0.2)
BASOPHILS NFR BLD AUTO: 0 %
BILIRUB SERPL-MCNC: 0.2 MG/DL (ref 0–1.2)
BUN SERPL-MCNC: 18 MG/DL (ref 8–27)
BUN/CREAT SERPL: 23 (ref 12–28)
CALCIUM SERPL-MCNC: 9.5 MG/DL (ref 8.7–10.3)
CHLORIDE SERPL-SCNC: 103 MMOL/L (ref 96–106)
CHOLEST SERPL-MCNC: 333 MG/DL (ref 100–199)
CO2 SERPL-SCNC: 25 MMOL/L (ref 20–29)
CREAT SERPL-MCNC: 0.78 MG/DL (ref 0.57–1)
CRP SERPL-MCNC: 3 MG/L (ref 0–10)
EGFRCR SERPLBLD CKD-EPI 2021: 86 ML/MIN/1.73
EOSINOPHIL # BLD AUTO: 0.1 X10E3/UL (ref 0–0.4)
EOSINOPHIL NFR BLD AUTO: 2 %
ERYTHROCYTE [DISTWIDTH] IN BLOOD BY AUTOMATED COUNT: 13.3 % (ref 11.7–15.4)
ERYTHROCYTE [SEDIMENTATION RATE] IN BLOOD BY WESTERGREN METHOD: 8 MM/HR (ref 0–40)
GLOBULIN SER CALC-MCNC: 3.1 G/DL (ref 1.5–4.5)
GLUCOSE SERPL-MCNC: 105 MG/DL (ref 65–99)
HCT VFR BLD AUTO: 37.7 % (ref 34–46.6)
HDLC SERPL-MCNC: 38 MG/DL
HGB BLD-MCNC: 12 G/DL (ref 11.1–15.9)
IMM GRANULOCYTES # BLD AUTO: 0 X10E3/UL (ref 0–0.1)
IMM GRANULOCYTES NFR BLD AUTO: 0 %
LDLC SERPL CALC-MCNC: 224 MG/DL (ref 0–99)
LYMPHOCYTES # BLD AUTO: 3.1 X10E3/UL (ref 0.7–3.1)
LYMPHOCYTES NFR BLD AUTO: 45 %
MCH RBC QN AUTO: 28 PG (ref 26.6–33)
MCHC RBC AUTO-ENTMCNC: 31.8 G/DL (ref 31.5–35.7)
MCV RBC AUTO: 88 FL (ref 79–97)
MONOCYTES # BLD AUTO: 0.8 X10E3/UL (ref 0.1–0.9)
MONOCYTES NFR BLD AUTO: 12 %
NEUTROPHILS # BLD AUTO: 2.9 X10E3/UL (ref 1.4–7)
NEUTROPHILS NFR BLD AUTO: 41 %
PLATELET # BLD AUTO: 309 X10E3/UL (ref 150–450)
POTASSIUM SERPL-SCNC: 4.2 MMOL/L (ref 3.5–5.2)
PROT SERPL-MCNC: 7.1 G/DL (ref 6–8.5)
RBC # BLD AUTO: 4.28 X10E6/UL (ref 3.77–5.28)
SODIUM SERPL-SCNC: 142 MMOL/L (ref 134–144)
TRIGL SERPL-MCNC: 337 MG/DL (ref 0–149)
VLDLC SERPL CALC-MCNC: 71 MG/DL (ref 5–40)
WBC # BLD AUTO: 6.9 X10E3/UL (ref 3.4–10.8)

## 2022-06-05 NOTE — PROGRESS NOTES
Call results to patient.  Cholesterol is very, very high similar last check.  Taking pravastatin?   I believe doesn't tolerate many statins.  Could try livalo 4mg po daily if covered and stop pravastatin.  Blood counts normal  Kidney and liver function normal

## 2022-06-06 DIAGNOSIS — E78.5 DYSLIPIDEMIA: Primary | ICD-10-CM

## 2022-06-23 RX ORDER — LISINOPRIL 20 MG/1
TABLET ORAL
Qty: 90 TABLET | Refills: 3 | Status: SHIPPED | OUTPATIENT
Start: 2022-06-23 | End: 2023-02-14 | Stop reason: SDUPTHER

## 2022-07-01 ENCOUNTER — TELEPHONE (OUTPATIENT)
Dept: FAMILY MEDICINE CLINIC | Facility: CLINIC | Age: 62
End: 2022-07-01

## 2022-07-01 NOTE — TELEPHONE ENCOUNTER
Caller: Marizol Park    Relationship: Self    Best call back number: [phone] (H)    What is the best time to reach you: ANYTIME, ASAP    Who are you requesting to speak with (clinical staff, provider,  specific staff member): CLINICAL STAFF/ DR ZAMORA    Do you know the name of the person who called: Marizol Park    What was the call regarding: PATIENT WAS BLOWING HER NOSE YESTERDAY AFTERNOON 06/30/2022 MAY HAVE BLOWN IT A LITTLE TOO HARD AND NOW HER THROAT HURTS VERY BAD AND SHE CAN BARELY SWALLOW FOOD OR WATER OR SALIVA AND SHE HEARD A LITTLE POP WHEN IT HAPPENED, PATIENT STATES SHE CAN FEEL THE SPOT THAT HURTS FROM THE OUTSIDE, PLEASE ADVISE ASAP    Do you require a callback: YES, ASAP

## 2022-07-01 NOTE — TELEPHONE ENCOUNTER
PT INFORMED TO GET RAPID COVID TESTS AND WARM SALT WATER RINSES TO THROAT AND CALL IF POSITIVE FOR COVID

## 2022-07-01 NOTE — TELEPHONE ENCOUNTER
I would gargle with salt water to see if settles and also I would go have a covid 19 test to see if has.  Let us know if positive.   RRJ

## 2022-08-12 ENCOUNTER — TELEPHONE (OUTPATIENT)
Dept: FAMILY MEDICINE CLINIC | Facility: CLINIC | Age: 62
End: 2022-08-12

## 2022-08-12 DIAGNOSIS — M10.09 ACUTE IDIOPATHIC GOUT OF MULTIPLE SITES: Primary | ICD-10-CM

## 2022-08-12 NOTE — TELEPHONE ENCOUNTER
Caller: Marizol Park    Relationship: Self    Best call back number: [phone]    What was the call regarding: PATIENT'S RIGHT FOOT IS SWOLLEN AND PAINFUL ON THE TOP OF IT. THE SWELLING AND PAIN STARTED EARLIER THIS WEEK, PATIENT STATES SHE DID NOT HURT IT IN ANYWAY. NO OPENINGS FOR ONE WEEK, PLEASE ADVISE WHAT PATIENT SHOULD DO FOR THE PAIN AND SWELLING.     PATIENT CAN NOT TAKE ADVIL OR ALEVE. IS THERE ANYTHING SHE CAN TAKE BESIDES TYLENOL?    Do you require a callback: YES

## 2022-08-13 RX ORDER — PREDNISONE 10 MG/1
TABLET ORAL
Qty: 32 TABLET | Refills: 0 | Status: SHIPPED | OUTPATIENT
Start: 2022-08-13

## 2022-08-13 NOTE — TELEPHONE ENCOUNTER
Sounds like a gout attack.  I sent in prednisone to try and stop in Monday for blood work to check.  I am working 8/14/22 [id number] [facility] if can see me there. We can do blood work there but I can look at foot.  I would start steroid today if can get.  RRJ

## 2022-08-17 LAB
BUN SERPL-MCNC: 18 MG/DL (ref 8–27)
BUN/CREAT SERPL: 21 (ref 12–28)
CALCIUM SERPL-MCNC: 10.3 MG/DL (ref 8.7–10.3)
CHLORIDE SERPL-SCNC: 103 MMOL/L (ref 96–106)
CO2 SERPL-SCNC: 24 MMOL/L (ref 20–29)
CREAT SERPL-MCNC: 0.84 MG/DL (ref 0.57–1)
EGFRCR-CYS SERPLBLD CKD-EPI 2021: 79 ML/MIN/1.73
GLUCOSE SERPL-MCNC: 93 MG/DL (ref 65–99)
POTASSIUM SERPL-SCNC: 4.5 MMOL/L (ref 3.5–5.2)
SODIUM SERPL-SCNC: 145 MMOL/L (ref 134–144)
URATE SERPL-MCNC: 4.7 MG/DL (ref 3–7.2)

## 2022-08-19 ENCOUNTER — TELEPHONE (OUTPATIENT)
Dept: FAMILY MEDICINE CLINIC | Facility: CLINIC | Age: 62
End: 2022-08-19

## 2022-08-19 NOTE — TELEPHONE ENCOUNTER
Pt wants to know what could be causing the foot pain?  She said the pain is now going across the top of her foot. She hasn't had any injury to her foot. I told her she should try to avoid salt/sodiun, ice it, keep foot elevated when she is sitting and tylenol for the pain.

## 2022-08-19 NOTE — TELEPHONE ENCOUNTER
The lab tests don't necessarily rule out gout as sometimes can have normal uric acid and still have flare.  Less likely though.  I would elevate, ice and ace wrap.  If doesn't settle by end of next week will need to image and evaluate further.  RRJ

## 2022-08-19 NOTE — TELEPHONE ENCOUNTER
Caller: Marizol Park    Relationship: Self    Best call back number: [phone]      Caller requesting test results: PATIENT    What test was performed: LABS    When was the test performed: 08/16/22    Where was the test performed: OFFICE    Additional notes: SHE HAS NOT HEARD FROM ANYONE ABOUT RESULTS.

## 2022-08-22 ENCOUNTER — TELEPHONE (OUTPATIENT)
Dept: FAMILY MEDICINE CLINIC | Facility: CLINIC | Age: 62
End: 2022-08-22

## 2022-08-22 ENCOUNTER — CLINICAL SUPPORT (OUTPATIENT)
Dept: FAMILY MEDICINE CLINIC | Facility: CLINIC | Age: 62
End: 2022-08-22

## 2022-08-22 DIAGNOSIS — M79.671 ACUTE FOOT PAIN, RIGHT: Primary | ICD-10-CM

## 2022-08-22 DIAGNOSIS — M10.9 GOUT, UNSPECIFIED CAUSE, UNSPECIFIED CHRONICITY, UNSPECIFIED SITE: ICD-10-CM

## 2022-08-22 PROCEDURE — 96372 THER/PROPH/DIAG INJ SC/IM: CPT | Performed by: FAMILY MEDICINE

## 2022-08-22 RX ORDER — TRIAMCINOLONE ACETONIDE 40 MG/ML
40 INJECTION, SUSPENSION INTRA-ARTICULAR; INTRAMUSCULAR ONCE
Status: COMPLETED | OUTPATIENT
Start: 2022-08-22 | End: 2022-08-22

## 2022-08-22 RX ADMIN — TRIAMCINOLONE ACETONIDE 40 MG: 40 INJECTION, SUSPENSION INTRA-ARTICULAR; INTRAMUSCULAR at 15:25

## 2022-08-22 NOTE — TELEPHONE ENCOUNTER
Caller: Marizol Park    Relationship: Self    Best call back number:   Marizol Park (Self) [phone] (H)         What is the best time to reach you:     Who are you requesting to speak with (clinical staff, provider,  specific staff member):     Do you know the name of the person who called:     What was the call regarding:   POSSIBLE CORTISONE SHOT FOR HER FOOT  STILL HURTING AND HAS 2 DAYS LEFT ON PREDNISONE       Do you require a callback YES PLEASE / TODAY            ----- Message from Mary Henry MD sent at 1/5/2017  4:26 PM CST -----  Please notify Emmanuelle that her hip x-ray is unchanged from previous x-ray dated 3/31/2012. Thanks.

## 2022-08-22 NOTE — TELEPHONE ENCOUNTER
Fax order to [facility] for foot xray (I put in order) and ok nurse visit for kenalog 40 mg IM.  I put in order.  RRJ

## 2022-09-01 ENCOUNTER — TELEPHONE (OUTPATIENT)
Dept: FAMILY MEDICINE CLINIC | Facility: CLINIC | Age: 62
End: 2022-09-01

## 2022-09-01 DIAGNOSIS — E78.5 DYSLIPIDEMIA: ICD-10-CM

## 2022-09-01 DIAGNOSIS — F41.9 ANXIETY: ICD-10-CM

## 2022-09-01 DIAGNOSIS — K21.9 GASTROESOPHAGEAL REFLUX DISEASE, UNSPECIFIED WHETHER ESOPHAGITIS PRESENT: Primary | ICD-10-CM

## 2022-09-01 RX ORDER — VENLAFAXINE HYDROCHLORIDE 75 MG/1
CAPSULE, EXTENDED RELEASE ORAL
Qty: 90 CAPSULE | Refills: 1 | Status: SHIPPED | OUTPATIENT
Start: 2022-09-01 | End: 2023-03-09

## 2022-09-01 RX ORDER — OMEPRAZOLE 40 MG/1
40 CAPSULE, DELAYED RELEASE ORAL 2 TIMES DAILY
Qty: 180 CAPSULE | Refills: 1 | Status: SHIPPED | OUTPATIENT
Start: 2022-09-01

## 2022-09-01 NOTE — TELEPHONE ENCOUNTER
----- Message from Christie Franco sent at 9/1/2022  1:06 PM EDT -----  Regarding: Medication refills  Patient needing a call back abut her Pravastatin 20 mg 1 X day    Please call her to discuss this and her other meds    Thanks.

## 2022-09-01 NOTE — TELEPHONE ENCOUNTER
Caller: Marizol Park    Relationship: Self    Best call back number: [phone]    Requested Prescriptions:   Requested Prescriptions     Pending Prescriptions Disp Refills   • adalimumab (HUMIRA) 40 MG/0.8ML Prefilled Syringe Kit injection 0.8 mL 0     Sig: Inject 0.8 mL under the skin into the appropriate area as directed 1 (One) Time for 1 dose. Once every 2 weeks        Pharmacy where request should be sent: [id number] [address] AT [facility] - [phone] [facility] [phone] FX     Additional details provided by patient PATIENT WOULD [facility] MED[facility]ONG WITH THE OTHER MEDICATIONS SHE HAS ON ACTIVE MEDICATION LIST.    PATIENT NEEDS REFILL ON THIS MEDICATION    Does the patient have less than a 3 day supply:  [] Yes  [x] No    Christie Bolanos Rep   09/01/22 15:59 EDT

## 2022-09-06 ENCOUNTER — TELEPHONE (OUTPATIENT)
Dept: FAMILY MEDICINE CLINIC | Facility: CLINIC | Age: 62
End: 2022-09-06

## 2022-09-06 DIAGNOSIS — E78.5 DYSLIPIDEMIA: Primary | ICD-10-CM

## 2022-09-06 RX ORDER — PRAVASTATIN SODIUM 80 MG/1
80 TABLET ORAL DAILY
Qty: 90 TABLET | Refills: 1 | Status: SHIPPED | OUTPATIENT
Start: 2022-09-06 | End: 2022-12-05

## 2022-09-06 NOTE — TELEPHONE ENCOUNTER
Caller: Marizol Elam    Relationship: Self    Best call back number: [phone] (H)    What is the best time to reach you: ANYTIME, ASAP    Who are you requesting to speak with (clinical staff, provider,  specific staff member): CLINICAL STAFF/ DR ZAMORA    Do you know the name of the person who called: MARIZOL ELAM    What was the call regarding: PATIENT STATES HER CHOLESTEROL MEDICATION IS $200/3 MONTHS AND SHE CANNOT AFFORD THIS AND IS ASKING IF DR ZAMORA CAN FIND SOMETHING CHEAPER    PATIENT ALSO WANTS TO KNOW IF SHE CAN STOP TAKING THE MEDICATION VENLAFAXINE 150 MG ONCE A DAY AND INSTEAD TA 75MG ONCE A DAY, PLEASE ADVISE PATIENT ASAP    Do you require a callback: YES, ASAP

## 2022-09-06 NOTE — TELEPHONE ENCOUNTER
Stop livalo and can she tolerate pravastatin 80mg daily?  Ok send  Ok change venlafaxine 150mg to 75m daily.   Ok to send.  RRJ

## 2022-10-07 ENCOUNTER — TELEPHONE (OUTPATIENT)
Dept: FAMILY MEDICINE CLINIC | Facility: CLINIC | Age: 62
End: 2022-10-07

## 2022-10-07 NOTE — TELEPHONE ENCOUNTER
Pt is asking for alternative for the pravastatin due to the cost of med. Please send med to [facility].

## 2022-10-07 NOTE — TELEPHONE ENCOUNTER
She can get 90 days for 21.67 with goodrx at [facility] if wants.  Not sure why so high, its an old drug.  Ok send with numbers below if wants.  RRJ

## 2022-10-07 NOTE — TELEPHONE ENCOUNTER
Caller: Marizol Park    Relationship: Self    Best call back number: [phone] (H)    What is the best time to reach you: ANYTIME    What was the call regarding: PATIENT IS WANTING TO KNOW IF SHE CAN GET MEDICATION CHEAPER IN PRICE    pravastatin (PRAVACHOL) 80 MG tablet  Do you require a callback: YES

## 2022-11-07 ENCOUNTER — CLINICAL SUPPORT (OUTPATIENT)
Dept: FAMILY MEDICINE CLINIC | Facility: CLINIC | Age: 62
End: 2022-11-07

## 2022-11-07 DIAGNOSIS — Z23 NEED FOR INFLUENZA VACCINATION: Primary | ICD-10-CM

## 2022-11-07 PROCEDURE — 90686 IIV4 VACC NO PRSV 0.5 ML IM: CPT | Performed by: FAMILY MEDICINE

## 2022-11-07 PROCEDURE — G0008 ADMIN INFLUENZA VIRUS VAC: HCPCS | Performed by: FAMILY MEDICINE

## 2022-11-16 DIAGNOSIS — E78.5 DYSLIPIDEMIA: ICD-10-CM

## 2022-11-16 RX ORDER — PRAVASTATIN SODIUM 20 MG
20 TABLET ORAL DAILY
Qty: 90 TABLET | Refills: 3 | OUTPATIENT
Start: 2022-11-16

## 2022-12-05 ENCOUNTER — OFFICE VISIT (OUTPATIENT)
Dept: FAMILY MEDICINE CLINIC | Facility: CLINIC | Age: 62
End: 2022-12-05

## 2022-12-05 VITALS
DIASTOLIC BLOOD PRESSURE: 70 MMHG | SYSTOLIC BLOOD PRESSURE: 110 MMHG | OXYGEN SATURATION: 99 % | HEIGHT: 60 IN | WEIGHT: 148 LBS | BODY MASS INDEX: 29.06 KG/M2 | HEART RATE: 92 BPM

## 2022-12-05 DIAGNOSIS — Z12.31 ENCOUNTER FOR SCREENING MAMMOGRAM FOR BREAST CANCER: ICD-10-CM

## 2022-12-05 DIAGNOSIS — R53.83 OTHER FATIGUE: ICD-10-CM

## 2022-12-05 DIAGNOSIS — Z00.00 MEDICARE ANNUAL WELLNESS VISIT, SUBSEQUENT: Primary | ICD-10-CM

## 2022-12-05 DIAGNOSIS — K51.30 ULCERATIVE RECTOSIGMOIDITIS WITHOUT COMPLICATION: ICD-10-CM

## 2022-12-05 DIAGNOSIS — G25.3 MYOCLONIC JERKING: ICD-10-CM

## 2022-12-05 DIAGNOSIS — I10 BENIGN ESSENTIAL HYPERTENSION: ICD-10-CM

## 2022-12-05 DIAGNOSIS — D64.9 NORMOCYTIC ANEMIA: ICD-10-CM

## 2022-12-05 DIAGNOSIS — E66.3 OVERWEIGHT (BMI 25.0-29.9): ICD-10-CM

## 2022-12-05 DIAGNOSIS — E78.5 DYSLIPIDEMIA: ICD-10-CM

## 2022-12-05 DIAGNOSIS — M10.09 ACUTE IDIOPATHIC GOUT OF MULTIPLE SITES: ICD-10-CM

## 2022-12-05 DIAGNOSIS — E55.9 VITAMIN D DEFICIENCY: ICD-10-CM

## 2022-12-05 PROCEDURE — G0439 PPPS, SUBSEQ VISIT: HCPCS | Performed by: FAMILY MEDICINE

## 2022-12-05 PROCEDURE — 99214 OFFICE O/P EST MOD 30 MIN: CPT | Performed by: FAMILY MEDICINE

## 2022-12-05 PROCEDURE — 1159F MED LIST DOCD IN RCRD: CPT | Performed by: FAMILY MEDICINE

## 2022-12-05 PROCEDURE — 1170F FXNL STATUS ASSESSED: CPT | Performed by: FAMILY MEDICINE

## 2022-12-05 PROCEDURE — 1126F AMNT PAIN NOTED NONE PRSNT: CPT | Performed by: FAMILY MEDICINE

## 2022-12-05 RX ORDER — PRAVASTATIN SODIUM 40 MG
40 TABLET ORAL DAILY
Qty: 180 TABLET | Refills: 1 | Status: SHIPPED | OUTPATIENT
Start: 2022-12-05

## 2022-12-05 NOTE — PROGRESS NOTES
QUICK REFERENCE INFORMATION:  The ABCs of the Annual Wellness Visit    Subsequent Medicare Wellness Visit    HEALTH RISK ASSESSMENT    1960    Recent Hospitalizations:  No hospitalization(s) within the last year..        Current Medical Providers:  Patient Care Team:  Ras Mcconnell DO as PCP - General (Family Medicine)        Smoking Status:  Social History     Tobacco Use   Smoking Status Never   Smokeless Tobacco Never       Alcohol Consumption:  Social History     Substance and Sexual Activity   Alcohol Use No       Depression Screen:   PHQ-2/PHQ-9 Depression Screening 6/3/2022   Retired PHQ-9 Total Score -   Retired Total Score -   Little Interest or Pleasure in Doing Things 0-->not at all   Feeling Down, Depressed or Hopeless 0-->not at all   Trouble Falling or Staying Asleep, or Sleeping Too Much 0-->not at all   Feeling Tired or Having Little Energy 0-->not at all   Poor Appetite or Overeating 0-->not at all   Feeling Bad about Yourself - or that You are a Failure or Have Let Yourself or Your Family Down 0-->not at all   Trouble Concentrating on Things, Such as Reading the Newspaper or Watching Television 0-->not at all   Moving or Speaking So Slowly that Other People Could Have Noticed? Or the Opposite - Being So Fidgety 0-->not at all   Thoughts that You Would be Better Off Dead or of Hurting Yourself in Some Way 0-->not at all   PHQ-9: Brief Depression Severity Measure Score 0       Health Habits and Functional and Cognitive Screening:  Functional & Cognitive Status 12/5/2022   Do you have difficulty preparing food and eating? No   Do you have difficulty bathing yourself, getting dressed or grooming yourself? No   Do you have difficulty using the toilet? No   Do you have difficulty moving around from place to place? No   Do you have trouble with steps or getting out of a bed or a chair? No   Current Diet Well Balanced Diet   Dental Exam Up to date   Eye Exam Up to date   Exercise (times per week)  4 times per week   Current Exercises Include Walking   Current Exercise Activities Include -   Do you need help using the phone?  No   Are you deaf or do you have serious difficulty hearing?  No   Do you need help with transportation? No   Do you need help shopping? No   Do you need help preparing meals?  No   Do you need help with housework?  No   Do you need help with laundry? No   Do you need help taking your medications? No   Do you need help managing money? No   Do you ever drive or ride in a car without wearing a seat belt? No   Have you felt unusual stress, anger or loneliness in the last month? No   Who do you live with? Spouse   If you need help, do you have trouble finding someone available to you? No   Have you been bothered in the last four weeks by sexual problems? No   Do you have difficulty concentrating, remembering or making decisions? No           Does the patient have evidence of cognitive impairment? No    Aspirin use counseling: Does not need ASA (and currently is not on it)      Recent Lab Results:  CMP:  Lab Results   Component Value Date     (H) 01/23/2018    BUN 18 08/16/2022    CREATININE 0.84 08/16/2022    EGFRIFNONA 89 05/24/2021    EGFRIFAFRI 108 05/24/2021    BCR 21 08/16/2022     (H) 08/16/2022    K 4.5 08/16/2022    CO2 24 08/16/2022    CALCIUM 10.3 08/16/2022    PROTENTOTREF 7.1 06/03/2022    ALBUMIN 4.0 06/03/2022    LABGLOBREF 3.1 06/03/2022    LABIL2 1.3 06/03/2022    BILITOT 0.2 06/03/2022    ALKPHOS 99 06/03/2022    AST 40 06/03/2022    ALT 43 (H) 06/03/2022     Lipid Panel:  Lab Results   Component Value Date    TRIG 337 (H) 06/03/2022    HDL 38 (L) 06/03/2022    VLDL 71 (H) 06/03/2022     HbA1c:       Visual Acuity:  No results found.    Age-appropriate Screening Schedule:  Refer to the list below for future screening recommendations based on patient's age, sex and/or medical conditions. Orders for these recommended tests are listed in the plan section. The  patient has been provided with a written plan.    Health Maintenance   Topic Date Due   • MAMMOGRAM  01/13/2023   • LIPID PANEL  06/03/2023   • TDAP/TD VACCINES (2 - Td or Tdap) 08/16/2026   • INFLUENZA VACCINE  Completed   • PAP SMEAR  Discontinued        Subjective   History of Present Illness    Marizol Park is a 62 y.o. female who presents for an Subsequent Wellness Visit.    The following portions of the patient's history were reviewed and updated as appropriate: allergies, current medications, past family history, past medical history, past social history, past surgical history and problem list.    Outpatient Medications Prior to Visit   Medication Sig Dispense Refill   • adalimumab (HUMIRA) 40 MG/0.8ML Prefilled Syringe Kit injection Inject 0.8 mL under the skin into the appropriate area as directed Every 14 (Fourteen) Days. 2 each 5   • Apoaequorin (Prevagen) 10 MG capsule Take  by mouth.     • cetirizine (zyrTEC) 10 MG tablet Take 1 tablet by mouth Daily. 90 tablet 0   • fluticasone (FLONASE) 50 MCG/ACT nasal spray 2 sprays into the nostril(s) as directed by provider Daily. 16 g 5   • Glycopyrronium Tosylate 2.4 % pads Apply 1 application topically Daily As Needed (hyperhidrosis). 30 each 5   • lisinopril (PRINIVIL,ZESTRIL) 20 MG tablet TAKE 1 TABLET DAILY FOR    BLOOD PRESSURE 90 tablet 3   • Nutritional Supplements (Ensure Original) liquid Take 1 can by mouth 2 (Two) Times a Day. 73128 mL 12   • omeprazole (priLOSEC) 40 MG capsule Take 1 capsule by mouth 2 (Two) Times a Day. 180 capsule 1   • traZODone (DESYREL) 50 MG tablet Take 0.5 tablets by mouth Every Night. (Patient taking differently: Take 50 mg by mouth As Needed.) 30 tablet 1   • venlafaxine XR (EFFEXOR-XR) 75 MG 24 hr capsule TAKE 1 CAPSULE DAILY. TAKE WITH EFFEXOR 150 MG. 90 capsule 1   • pravastatin (PRAVACHOL) 80 MG tablet Take 1 tablet by mouth Daily. 90 tablet 1   • albuterol sulfate  (90 Base) MCG/ACT inhaler INHALE 2 PUFFS BY MOUTH  EVERY 4 HOURS AS NEEDED FOR WHEEZING OR SHORTNESS OF BREATH 8.5 g 5   • B Complex Vitamins (vitamin b complex) capsule capsule Take 1 capsule by mouth Daily.     • calcium citrate-vitamin d (CITRACAL) 200-250 MG-UNIT tablet tablet Take  by mouth Daily.     • multivitamin with minerals tablet tablet Take 1 tablet by mouth Daily.     • predniSONE (DELTASONE) 10 MG tablet 6 tabs po qd x 2 days, 4 tabs po qd x 2 days, 3 tabs po qd x 2 days, 2 tabs po qd x 2 days, 1 tab po qd x 2 days 32 tablet 0   • tiZANidine (ZANAFLEX) 4 MG tablet Take 1 tablet by mouth Every 8 (Eight) Hours As Needed for Muscle Spasms. 30 tablet 5   • vitamin B-12 (CYANOCOBALAMIN) 100 MCG tablet Take 50 mcg by mouth Daily.       Facility-Administered Medications Prior to Visit   Medication Dose Route Frequency Provider Last Rate Last Admin   • lidocaine (XYLOCAINE) 2% injection 1 mL  1 mL Injection Once Jovita Auguste APRN           Patient Active Problem List   Diagnosis   • Acute post-traumatic stress disorder   • Anxiety   • Benign essential hypertension   • Excessive cerumen in ear canal   • Cough   • Dyslipidemia   • External hemorrhoids   • Gastroesophageal reflux disease   • Hemorrhoids   • Panic disorder without agoraphobia   • Peripheral neuropathy   • Ulcerative colitis (HCC)   • Ulnar neuropathy   • Immunocompromised state (HCC)   • High triglycerides   • Right anterior shoulder pain   • Hypertension   • Ingrown nail   • Pain in limb       Advance Care Planning:  ACP discussion was held with the patient during this visit. Patient does not have an advance directive, information provided.    Identification of Risk Factors:  Risk factors include: Obesity/Overweight .    Review of Systems   Constitutional: Positive for fatigue. Negative for chills and fever.   Respiratory: Negative for shortness of breath and wheezing.    Cardiovascular: Negative for chest pain, palpitations and leg swelling.   Gastrointestinal: Negative for abdominal  "pain, anal bleeding, blood in stool and diarrhea.   Musculoskeletal: Positive for myalgias.       Compared to one year ago, the patient feels her physical health is the same.  Compared to one year ago, the patient feels her mental health is the same.    Objective     Physical Exam  Vitals and nursing note reviewed.   Constitutional:       Appearance: She is well-developed.   HENT:      Head: Normocephalic and atraumatic.   Neck:      Thyroid: No thyromegaly.      Vascular: No JVD.   Cardiovascular:      Rate and Rhythm: Normal rate and regular rhythm.      Heart sounds: Normal heart sounds. No murmur heard.    No friction rub. No gallop.   Pulmonary:      Effort: Pulmonary effort is normal. No respiratory distress.      Breath sounds: Normal breath sounds. No wheezing or rales.   Abdominal:      General: Bowel sounds are normal. There is no distension.      Palpations: Abdomen is soft.      Tenderness: There is no abdominal tenderness. There is no guarding or rebound.   Musculoskeletal:      Cervical back: Neck supple.   Skin:     General: Skin is warm and dry.   Neurological:      Mental Status: She is alert.   Psychiatric:         Behavior: Behavior normal.         Vitals:    12/05/22 1002   BP: 110/70   Pulse: 92   SpO2: 99%   Weight: 67.1 kg (148 lb)   Height: 152.4 cm (60\")   PainSc: 0-No pain     Body mass index is 28.9 kg/m².    BMI is >= 25 and <30. (Overweight) The following options were offered after discussion;: weight loss educational material (shared in after visit summary)      Assessment & Plan   Patient Self-Management and Personalized Health Advice  The patient has been provided with information about: diet and exercise and preventive services including:   · Annual Wellness Visit (AWV).    Visit Diagnoses:    ICD-10-CM ICD-9-CM   1. Medicare annual wellness visit, subsequent  Z00.00 V70.0   2. Myoclonic jerking  G25.3 333.2   3. Other fatigue  R53.83 780.79   4. Overweight (BMI 25.0-29.9)  E66.3 " 278.02   5. Ulcerative rectosigmoiditis without complication (HCC)  K51.30 556.3   6. Dyslipidemia  E78.5 272.4   7. Vitamin D deficiency  E55.9 268.9   8. Acute idiopathic gout of multiple sites  M10.09 274.01   9. Benign essential hypertension  I10 401.1   10. Normocytic anemia  D64.9 285.9   11. Encounter for screening mammogram for breast cancer  Z12.31 V76.12       Orders Placed This Encounter   Procedures   • Mammo Screening Bilateral With CAD     Standing Status:   Future     Standing Expiration Date:   12/5/2023     Scheduling Instructions:      Prefers [facility]     Order Specific Question:   Reason for Exam:     Answer:   breast cancer screening   • Comprehensive Metabolic Panel     Order Specific Question:   Release to patient     Answer:   Routine Release   • Lipid Panel   • TSH     Order Specific Question:   Release to patient     Answer:   Routine Release   • Vitamin D,25-Hydroxy     Order Specific Question:   Release to patient     Answer:   Routine Release   • Ferritin   • Vitamin B12     Order Specific Question:   Release to patient     Answer:   Routine Release   • Folate     Order Specific Question:   Release to patient     Answer:   Routine Release   • Magnesium     Order Specific Question:   Release to patient     Answer:   Routine Release   • Uric Acid     Order Specific Question:   Release to patient     Answer:   Routine Release   • Iron Profile   • CBC & Differential     Order Specific Question:   Manual Differential     Answer:   No       Outpatient Encounter Medications as of 12/5/2022   Medication Sig Dispense Refill   • adalimumab (HUMIRA) 40 MG/0.8ML Prefilled Syringe Kit injection Inject 0.8 mL under the skin into the appropriate area as directed Every 14 (Fourteen) Days. 2 each 5   • Apoaequorin (Prevagen) 10 MG capsule Take  by mouth.     • cetirizine (zyrTEC) 10 MG tablet Take 1 tablet by mouth Daily. 90 tablet 0   • fluticasone (FLONASE) 50 MCG/ACT nasal spray 2 sprays into the  nostril(s) as directed by provider Daily. 16 g 5   • Glycopyrronium Tosylate 2.4 % pads Apply 1 application topically Daily As Needed (hyperhidrosis). 30 each 5   • lisinopril (PRINIVIL,ZESTRIL) 20 MG tablet TAKE 1 TABLET DAILY FOR    BLOOD PRESSURE 90 tablet 3   • Nutritional Supplements (Ensure Original) liquid Take 1 can by mouth 2 (Two) Times a Day. 57131 mL 12   • omeprazole (priLOSEC) 40 MG capsule Take 1 capsule by mouth 2 (Two) Times a Day. 180 capsule 1   • traZODone (DESYREL) 50 MG tablet Take 0.5 tablets by mouth Every Night. (Patient taking differently: Take 50 mg by mouth As Needed.) 30 tablet 1   • venlafaxine XR (EFFEXOR-XR) 75 MG 24 hr capsule TAKE 1 CAPSULE DAILY. TAKE WITH EFFEXOR 150 MG. 90 capsule 1   • [DISCONTINUED] pravastatin (PRAVACHOL) 80 MG tablet Take 1 tablet by mouth Daily. 90 tablet 1   • albuterol sulfate  (90 Base) MCG/ACT inhaler INHALE 2 PUFFS BY MOUTH EVERY 4 HOURS AS NEEDED FOR WHEEZING OR SHORTNESS OF BREATH 8.5 g 5   • B Complex Vitamins (vitamin b complex) capsule capsule Take 1 capsule by mouth Daily.     • calcium citrate-vitamin d (CITRACAL) 200-250 MG-UNIT tablet tablet Take  by mouth Daily.     • multivitamin with minerals tablet tablet Take 1 tablet by mouth Daily.     • pravastatin (Pravachol) 40 MG tablet Take 1 tablet by mouth Daily. 180 tablet 1   • predniSONE (DELTASONE) 10 MG tablet 6 tabs po qd x 2 days, 4 tabs po qd x 2 days, 3 tabs po qd x 2 days, 2 tabs po qd x 2 days, 1 tab po qd x 2 days 32 tablet 0   • tiZANidine (ZANAFLEX) 4 MG tablet Take 1 tablet by mouth Every 8 (Eight) Hours As Needed for Muscle Spasms. 30 tablet 5   • vitamin B-12 (CYANOCOBALAMIN) 100 MCG tablet Take 50 mcg by mouth Daily.       Facility-Administered Encounter Medications as of 12/5/2022   Medication Dose Route Frequency Provider Last Rate Last Admin   • lidocaine (XYLOCAINE) 2% injection 1 mL  1 mL Injection Once Jovita Auguste APRN           Reviewed use of high risk  "medication in the elderly: yes  Reviewed for potential of harmful drug interactions in the elderly: yes    Follow Up:  Return in about 6 months (around 6/5/2023).     An After Visit Summary and PPPS with all of these plans were given to the patient.           ++++++++++++++++++++++++++++++++++++++++++++++++++++++++++++++++++     Chief Complaint   Patient presents with   • Hypertension   • Follow-up   • Annual Exam     Medicare wellness   • Anxiety   • Hyperlipidemia     Pills are large for pt. Pt cut in half and half again and pt states tastes bad     HPI  Patient here for awv, has gout, htn, hld and due for labs;  Cannot swallow pravastatin as too big and tastes awful when tries cutting, would like try smaller dose but  More;  Also rpeorts restless, arms/legs occly cramps;  Has UC, no abd pain or blood in stool;  Has hx of anemia;  No gout attacks;     Review of Systems   Constitutional: Positive for fatigue. Negative for chills and fever.   Cardiovascular: Negative for chest pain, leg swelling and palpitations.   Respiratory: Negative for shortness of breath and wheezing.    Musculoskeletal: Positive for myalgias.   Gastrointestinal: Negative for abdominal pain, anal bleeding, blood in stool and diarrhea.       Social History     Tobacco Use   • Smoking status: Never   • Smokeless tobacco: Never   Substance Use Topics   • Alcohol use: No     O:   Vitals:    12/05/22 1002   BP: 110/70   Pulse: 92   SpO2: 99%   Weight: 67.1 kg (148 lb)   Height: 152.4 cm (60\")   PainSc: 0-No pain     Body mass index is 28.9 kg/m².  Vitals and nursing note reviewed.   Constitutional:       Appearance: Well-developed.   HENT:      Head: Normocephalic and atraumatic.   Neck:      Thyroid: No thyromegaly.      Vascular: No JVD.   Pulmonary:      Effort: Pulmonary effort is normal. No respiratory distress.      Breath sounds: Normal breath sounds. No wheezing. No rales.   Cardiovascular:      Normal rate. Regular rhythm. Normal heart " sounds.      No gallop. No friction rub.   Abdominal:      General: Bowel sounds are normal. There is no distension.      Palpations: Abdomen is soft.      Tenderness: There is no abdominal tenderness. There is no guarding or rebound.   Musculoskeletal:      Cervical back: Neck supple. Skin:     General: Skin is warm and dry.   Neurological:      Mental Status: Alert.   Psychiatric:         Behavior: Behavior normal.         Diagnoses and all orders for this visit:    1. Medicare annual wellness visit, subsequent (Primary)    2. Myoclonic jerking  -     Comprehensive Metabolic Panel  -     CBC & Differential  -     TSH  -     Ferritin  -     Vitamin B12  -     Folate  -     Magnesium    3. Other fatigue  -     CBC & Differential  -     TSH  -     Ferritin  -     Vitamin B12    4. Overweight (BMI 25.0-29.9)    5. Ulcerative rectosigmoiditis without complication (HCC)  -     Comprehensive Metabolic Panel  -     CBC & Differential    6. Dyslipidemia  -     pravastatin (Pravachol) 40 MG tablet; Take 1 tablet by mouth Daily.  Dispense: 180 tablet; Refill: 1  -     Comprehensive Metabolic Panel  -     Lipid Panel    7. Vitamin D deficiency  -     Vitamin D,25-Hydroxy    8. Acute idiopathic gout of multiple sites  -     Uric Acid    9. Benign essential hypertension    10. Normocytic anemia  -     CBC & Differential  -     Ferritin  -     Vitamin B12  -     Folate  -     Iron Profile    11. Encounter for screening mammogram for breast cancer  -     Mammo Screening Bilateral With CAD; Future    -d/w rls and jerking, doubt med cause as not on meds typica;  Could have RLS, can have day time symptoms;  Will check as hx of anemia nad having fatigue;  Consider requip try  -ok adjust dose of pravastatin with more qty  Due check vitamin d  uc stable  -hypertension - controlled, continue medications  -due check uric aicd;    -hypertension - controlled, continue medications      Return in about 6 months (around 6/5/2023).

## 2022-12-05 NOTE — PATIENT INSTRUCTIONS
"\"0-9-0-Almost None!\"  Healthy Habits Start Early    EAT 5 OR MORE SERVINGS OF VEGETABLES AND FRUITS EVERY DAY.    Help Marizol get three vegetables and two fruits each day. Red, green, yellow, orange...encourage them to try all the colors so they can enjoy different flavors and get more vitamins.    How can I help Marizol do this?  ---------------------------------------------  -BE PATIENT WITH Marizol, remember it may take 10 times before they start to like new food. So, start with small bites and just keep trying.  -Serve at least one vegetable or fruit at every meal. Even try two. Remember, portions do not have to be as big as you think.  -Encourage eating fruits and vegetables instead of drinking them..it's a better way to get fiber and vitamins..so limit the amount of juice to 1/2 cup per day for children 1-6 years and one cup per day for children 7-18 years of age. Try using 1/2 part water and 1/2 part juice.    Spend less than two hours per day watching television and other screen media. Screen media includes video games, movies and computer use for entertainment.    How can I help Marizol do this?  -Turn off the TV at dinner. Dinner is the best time to hang out with your kids and just talk, learn about their day, and tell them about your day. Your kids have a lot to learn from you and dinner is a great time to share.  Advance Care Planning and Advance Directives     You make decisions on a daily basis - decisions about where you want to live, your career, your home, your life. Perhaps one of the most important decisions you face is your choice for future medical care. Take time to talk with your family and your healthcare team and start planning today.  Advance Care Planning is a process that can help you:  Understand possible future healthcare decisions in light of your own experiences  Reflect on those decision in light of your goals and values  Discuss your decisions with those closest to you and the healthcare " professionals that care for you  Make a plan by creating a document that reflects your wishes    Surrogate Decision Maker  In the event of a medical emergency, which has left you unable to communicate or to make your own decisions, you would need someone to make decisions for you.  It is important to discuss your preferences for medical treatment with this person while you are in good health.     Qualities of a surrogate decision maker:  Willing to take on this role and responsibility  Knows what you want for future medical care  Willing to follow your wishes even if they don't agree with them  Able to make difficult medical decisions under stressful circumstances    Advance Directives  These are legal documents you can create that will guide your healthcare team and decision maker(s) when needed. These documents can be stored in the electronic medical record.    Living Will - a legal document to guide your care if you have a terminal condition or a serious illness and are unable to communicate. States vary by statute in document names/types, but most forms may include one or more of the following:        -  Directions regarding life-prolonging treatments        -  Directions regarding artificially provided nutrition/hydration        -  Choosing a healthcare decision maker        -  Direction regarding organ/tissue donation    Durable Power of  for Healthcare - this document names an -in-fact to make medical decisions for you, but it may also allow this person to make personal and financial decisions for you. Please seek the advice of an  if you need this type of document.    **Advance Directives are not required and no one may discriminate against you if you do not sign one.    Medical Orders  Many states allow specific forms/orders signed by your physician to record your wishes for medical treatment in your current state of health. This form, signed in personal communication with your  physician, addresses resuscitation and other medical interventions that you may or may not want.      For more information or to schedule a time with a [facility] Advance Care Planning Facilitator contact: [facility]/ACP or call [phone] and someone will contact you directly.

## 2022-12-06 LAB
25(OH)D3+25(OH)D2 SERPL-MCNC: 45.5 NG/ML (ref 30–100)
ALBUMIN SERPL-MCNC: 4 G/DL (ref 3.5–5.2)
ALBUMIN/GLOB SERPL: 1.3 G/DL
ALP SERPL-CCNC: 95 U/L (ref 39–117)
ALT SERPL-CCNC: 37 U/L (ref 1–33)
AST SERPL-CCNC: 28 U/L (ref 1–32)
BASOPHILS # BLD AUTO: 0.02 10*3/MM3 (ref 0–0.2)
BASOPHILS NFR BLD AUTO: 0.3 % (ref 0–1.5)
BILIRUB SERPL-MCNC: 0.2 MG/DL (ref 0–1.2)
BUN SERPL-MCNC: 17 MG/DL (ref 8–23)
BUN/CREAT SERPL: 23.6 (ref 7–25)
CALCIUM SERPL-MCNC: 9.8 MG/DL (ref 8.6–10.5)
CHLORIDE SERPL-SCNC: 102 MMOL/L (ref 98–107)
CHOLEST SERPL-MCNC: 440 MG/DL (ref 0–200)
CO2 SERPL-SCNC: 29.6 MMOL/L (ref 22–29)
CREAT SERPL-MCNC: 0.72 MG/DL (ref 0.57–1)
EGFRCR SERPLBLD CKD-EPI 2021: 94.1 ML/MIN/1.73
EOSINOPHIL # BLD AUTO: 0.1 10*3/MM3 (ref 0–0.4)
EOSINOPHIL NFR BLD AUTO: 1.5 % (ref 0.3–6.2)
ERYTHROCYTE [DISTWIDTH] IN BLOOD BY AUTOMATED COUNT: 11.9 % (ref 12.3–15.4)
FERRITIN SERPL-MCNC: 17.6 NG/ML (ref 13–150)
FOLATE SERPL-MCNC: >20 NG/ML (ref 4.78–24.2)
GLOBULIN SER CALC-MCNC: 3.2 GM/DL
GLUCOSE SERPL-MCNC: 96 MG/DL (ref 65–99)
HCT VFR BLD AUTO: 39.1 % (ref 34–46.6)
HDLC SERPL-MCNC: 46 MG/DL (ref 40–60)
HGB BLD-MCNC: 13.4 G/DL (ref 12–15.9)
IMM GRANULOCYTES # BLD AUTO: 0.01 10*3/MM3 (ref 0–0.05)
IMM GRANULOCYTES NFR BLD AUTO: 0.2 % (ref 0–0.5)
IRON SATN MFR SERPL: 29 % (ref 20–50)
IRON SERPL-MCNC: 116 MCG/DL (ref 37–145)
LDLC SERPL CALC-MCNC: 322 MG/DL (ref 0–100)
LYMPHOCYTES # BLD AUTO: 2.31 10*3/MM3 (ref 0.7–3.1)
LYMPHOCYTES NFR BLD AUTO: 35.6 % (ref 19.6–45.3)
MAGNESIUM SERPL-MCNC: 2.1 MG/DL (ref 1.6–2.4)
MCH RBC QN AUTO: 29.2 PG (ref 26.6–33)
MCHC RBC AUTO-ENTMCNC: 34.3 G/DL (ref 31.5–35.7)
MCV RBC AUTO: 85.2 FL (ref 79–97)
MONOCYTES # BLD AUTO: 0.72 10*3/MM3 (ref 0.1–0.9)
MONOCYTES NFR BLD AUTO: 11.1 % (ref 5–12)
NEUTROPHILS # BLD AUTO: 3.32 10*3/MM3 (ref 1.7–7)
NEUTROPHILS NFR BLD AUTO: 51.3 % (ref 42.7–76)
NRBC BLD AUTO-RTO: 0 /100 WBC (ref 0–0.2)
PLATELET # BLD AUTO: 314 10*3/MM3 (ref 140–450)
POTASSIUM SERPL-SCNC: 4.6 MMOL/L (ref 3.5–5.2)
PROT SERPL-MCNC: 7.2 G/DL (ref 6–8.5)
RBC # BLD AUTO: 4.59 10*6/MM3 (ref 3.77–5.28)
SODIUM SERPL-SCNC: 140 MMOL/L (ref 136–145)
TIBC SERPL-MCNC: 401 MCG/DL
TRIGL SERPL-MCNC: 304 MG/DL (ref 0–150)
TSH SERPL DL<=0.005 MIU/L-ACNC: 1.73 UIU/ML (ref 0.27–4.2)
UIBC SERPL-MCNC: 285 MCG/DL (ref 112–346)
URATE SERPL-MCNC: 4.3 MG/DL (ref 2.4–5.7)
VIT B12 SERPL-MCNC: 955 PG/ML (ref 211–946)
VLDLC SERPL CALC-MCNC: 72 MG/DL (ref 5–40)
WBC # BLD AUTO: 6.48 10*3/MM3 (ref 3.4–10.8)

## 2022-12-17 DIAGNOSIS — E78.01 FAMILIAL HYPERCHOLESTEREMIA: Primary | ICD-10-CM

## 2022-12-17 NOTE — PROGRESS NOTES
Call results to patient.  Cholesterol extremity high and worrisome for stroke.   If can get covered I would start repatha 140mg subq weekly every 2 weeks dx familial hypercholesterolemia  Rest of labs normal

## 2023-01-20 ENCOUNTER — OFFICE (OUTPATIENT)
Dept: URBAN - METROPOLITAN AREA CLINIC 65 | Facility: CLINIC | Age: 63
End: 2023-01-20

## 2023-01-20 VITALS
WEIGHT: 150 LBS | SYSTOLIC BLOOD PRESSURE: 131 MMHG | HEART RATE: 96 BPM | HEIGHT: 60 IN | DIASTOLIC BLOOD PRESSURE: 80 MMHG

## 2023-01-20 DIAGNOSIS — K51.50 LEFT SIDED COLITIS WITHOUT COMPLICATIONS: ICD-10-CM

## 2023-01-20 DIAGNOSIS — K21.9 GASTRO-ESOPHAGEAL REFLUX DISEASE WITHOUT ESOPHAGITIS: ICD-10-CM

## 2023-01-20 DIAGNOSIS — Z92.25 PERSONAL HISTORY OF IMMUNOSUPPRESSION THERAPY: ICD-10-CM

## 2023-01-20 DIAGNOSIS — Z80.0 FAMILY HISTORY OF MALIGNANT NEOPLASM OF DIGESTIVE ORGANS: ICD-10-CM

## 2023-01-20 PROCEDURE — 99214 OFFICE O/P EST MOD 30 MIN: CPT | Performed by: INTERNAL MEDICINE

## 2023-02-14 RX ORDER — LISINOPRIL 20 MG/1
20 TABLET ORAL DAILY
Qty: 90 TABLET | Refills: 3 | Status: SHIPPED | OUTPATIENT
Start: 2023-02-14

## 2023-02-14 NOTE — TELEPHONE ENCOUNTER
Caller: Marizol Park    Relationship: Self    Best call back number: [phone]    Requested Prescriptions:   Requested Prescriptions     Pending Prescriptions Disp Refills   • lisinopril (PRINIVIL,ZESTRIL) 20 MG tablet 90 tablet 3     Sig: Take 1 tablet by mouth Daily. for blood pressure        Pharmacy where request should be sent: [id number] [address] [facility] AT [facility] [phone] [facility] [phone] FX     Additional details provided by patient:     Does the patient have less than a 3 day supply:  [] Yes  [x] No    Would you like a call back once the refill request has been completed: [] Yes [x] No    If the office needs to give you a call back, can they leave a voicemail: [] Yes [x] No     Marija Montaño, RegSched Rep   02/14/23 10:59 EST

## 2023-03-09 DIAGNOSIS — F41.9 ANXIETY: ICD-10-CM

## 2023-03-09 RX ORDER — VENLAFAXINE HYDROCHLORIDE 75 MG/1
CAPSULE, EXTENDED RELEASE ORAL
Qty: 90 CAPSULE | Refills: 1 | Status: SHIPPED | OUTPATIENT
Start: 2023-03-09 | End: 2023-03-13 | Stop reason: SDUPTHER

## 2023-03-13 DIAGNOSIS — F41.9 ANXIETY: ICD-10-CM

## 2023-03-13 RX ORDER — VENLAFAXINE HYDROCHLORIDE 75 MG/1
CAPSULE, EXTENDED RELEASE ORAL
Qty: 90 CAPSULE | Refills: 1 | Status: SHIPPED | OUTPATIENT
Start: 2023-03-13

## 2023-03-13 NOTE — TELEPHONE ENCOUNTER
Caller: Sherry Parkyanelis REYNOSO    Relationship: Self    Best call back number: [phone]  Requested Prescriptions:   Requested Prescriptions     Pending Prescriptions Disp Refills   • venlafaxine XR (EFFEXOR-XR) 75 MG 24 hr capsule 90 capsule 1     Sig: TAKE ONE CAPSULE BY MOUTH EVERY DAY WITH EFFEXOR 150 MG.        Pharmacy where request should be sent: [id number] [id number] AT [facility] [phone] [facility] [phone] FX     Additional details provided by patient: WILL NEED A NEW PRESCRIPTION SENT TO PHARMACY.  STATES THAT SHE ALSO TAKES THIS MEDICATION  MG AS WELL AT NIGHT.  WILL NEED THIS REFILLED AS WELL     Does the patient have less than a 3 day supply:  [x] Yes  [] No    Would you like a call back once the refill request has been completed: [x] Yes [] No    If the office needs to give you a call back, can they leave a voicemail: [x] Yes [] No    Christie Carver Rep   03/13/23 13:33 EDT

## 2023-03-17 DIAGNOSIS — F41.0 PANIC DISORDER WITHOUT AGORAPHOBIA: ICD-10-CM

## 2023-03-17 DIAGNOSIS — F41.9 ANXIETY: Primary | ICD-10-CM

## 2023-03-17 RX ORDER — VENLAFAXINE HYDROCHLORIDE 150 MG/1
150 CAPSULE, EXTENDED RELEASE ORAL DAILY
COMMUNITY
End: 2023-03-17 | Stop reason: SDUPTHER

## 2023-03-17 RX ORDER — VENLAFAXINE HYDROCHLORIDE 150 MG/1
150 CAPSULE, EXTENDED RELEASE ORAL DAILY
Qty: 90 CAPSULE | Refills: 1 | Status: SHIPPED | OUTPATIENT
Start: 2023-03-17

## 2023-03-24 ENCOUNTER — TELEPHONE (OUTPATIENT)
Dept: FAMILY MEDICINE CLINIC | Facility: CLINIC | Age: 63
End: 2023-03-24
Payer: MEDICARE

## 2023-03-24 RX ORDER — METHYLPREDNISOLONE 4 MG/1
TABLET ORAL
Qty: 21 TABLET | Refills: 0 | Status: SHIPPED | OUTPATIENT
Start: 2023-03-24

## 2023-03-24 RX ORDER — TIZANIDINE 4 MG/1
4 TABLET ORAL EVERY 8 HOURS PRN
Qty: 30 TABLET | Refills: 5 | Status: SHIPPED | OUTPATIENT
Start: 2023-03-24

## 2023-03-24 NOTE — TELEPHONE ENCOUNTER
Caller: Marizol Park    Relationship: Self    Best call back number:[phone]    What is the best time to reach you: ANYTIME    Who are you requesting to speak with (clinical staff, provider,  specific staff member): CLINCIAL    What was the call regarding:PATIENT STATES THAT HER  RIGHT ARM AND BACK OF SHOULDER FEELS AS IF ITS BURNING FOR THE LAST 4 DAYS. SHE STATES SHE HAS TRIED TAKING TYLENOL AND IT HAS HELPED A LITTLE BIT BUT NOT MUCH. SHE STATES SHE WAS TOLD TO NOT TAKE ALEVE IR ADVIL BECAUSE OF HER STOMACH. SOONEST APPOINTMENT  WITH DR ZAMORA WAS 07/2023.PATIENT IS REQUESTING A CALLBACK WITH ADVICE ON WHAT TO DO.

## 2023-05-29 DIAGNOSIS — K21.9 GASTROESOPHAGEAL REFLUX DISEASE, UNSPECIFIED WHETHER ESOPHAGITIS PRESENT: ICD-10-CM

## 2023-05-30 RX ORDER — OMEPRAZOLE 40 MG/1
CAPSULE, DELAYED RELEASE ORAL
Qty: 180 CAPSULE | Refills: 1 | Status: SHIPPED | OUTPATIENT
Start: 2023-05-30

## 2023-06-05 ENCOUNTER — OFFICE VISIT (OUTPATIENT)
Dept: FAMILY MEDICINE CLINIC | Facility: CLINIC | Age: 63
End: 2023-06-05
Payer: MEDICARE

## 2023-06-05 VITALS
HEART RATE: 97 BPM | BODY MASS INDEX: 29.25 KG/M2 | HEIGHT: 60 IN | OXYGEN SATURATION: 95 % | WEIGHT: 149 LBS | DIASTOLIC BLOOD PRESSURE: 72 MMHG | SYSTOLIC BLOOD PRESSURE: 110 MMHG

## 2023-06-05 DIAGNOSIS — E66.3 OVERWEIGHT (BMI 25.0-29.9): ICD-10-CM

## 2023-06-05 DIAGNOSIS — E55.9 VITAMIN D DEFICIENCY: ICD-10-CM

## 2023-06-05 DIAGNOSIS — E78.5 DYSLIPIDEMIA: ICD-10-CM

## 2023-06-05 DIAGNOSIS — I10 BENIGN ESSENTIAL HYPERTENSION: Primary | ICD-10-CM

## 2023-06-05 DIAGNOSIS — D64.9 NORMOCYTIC ANEMIA: ICD-10-CM

## 2023-06-05 DIAGNOSIS — K51.30 ULCERATIVE RECTOSIGMOIDITIS WITHOUT COMPLICATION: ICD-10-CM

## 2023-06-05 NOTE — PROGRESS NOTES
Marek Park is a 62 y.o. female. Presents today for   Chief Complaint   Patient presents with    Hypertension    Hyperlipidemia       Hypertension  Pertinent negatives include no chest pain, palpitations or shortness of breath.   Hyperlipidemia  Pertinent negatives include no chest pain or shortness of breath.   Patient 63 y/o female with htn, hld and uc, on humira;  had add repatha as extremely high;  Fell recently;  bruised some, but o/w no injury; did not hit head;  no abd pain;  no n/v; no blood in stool;  prior anemia due recheck.    Review of Systems   Constitutional:  Negative for fatigue.   Respiratory:  Negative for shortness of breath.    Cardiovascular:  Negative for chest pain and palpitations.   Gastrointestinal:  Negative for abdominal pain, nausea and vomiting.     Patient Active Problem List   Diagnosis    Acute post-traumatic stress disorder    Anxiety    Benign essential hypertension    Excessive cerumen in ear canal    Cough    Dyslipidemia    External hemorrhoids    Gastroesophageal reflux disease    Hemorrhoids    Panic disorder without agoraphobia    Peripheral neuropathy    Ulcerative colitis    Ulnar neuropathy    Immunocompromised state    High triglycerides    Right anterior shoulder pain    Hypertension    Ingrown nail    Pain in limb       Social History     Socioeconomic History    Marital status:    Tobacco Use    Smoking status: Never    Smokeless tobacco: Never   Vaping Use    Vaping Use: Never used   Substance and Sexual Activity    Alcohol use: No    Drug use: No    Sexual activity: Never       Allergies   Allergen Reactions    Remicade [Infliximab] Hives and Swelling     Facial swelling, hives wide spread    Sulfa Antibiotics Hives       Current Outpatient Medications on File Prior to Visit   Medication Sig Dispense Refill    adalimumab (HUMIRA) 40 MG/0.8ML Prefilled Syringe Kit injection Inject 0.8 mL under the skin into the appropriate area as directed Every  14 (Fourteen) Days. 2 each 5    albuterol sulfate  (90 Base) MCG/ACT inhaler INHALE 2 PUFFS BY MOUTH EVERY 4 HOURS AS NEEDED FOR WHEEZING OR SHORTNESS OF BREATH 8.5 g 5    Apoaequorin (Prevagen) 10 MG capsule Take  by mouth.      B Complex Vitamins (vitamin b complex) capsule capsule Take 1 capsule by mouth Daily.      calcium citrate-vitamin d (CITRACAL) 200-250 MG-UNIT tablet tablet Take  by mouth Daily.      cetirizine (zyrTEC) 10 MG tablet Take 1 tablet by mouth Daily. 90 tablet 0    Evolocumab (REPATHA) solution auto-injector SureClick injection Inject 1 mL under the skin into the appropriate area as directed Every 14 (Fourteen) Days. 1 mL 3    fluticasone (FLONASE) 50 MCG/ACT nasal spray 2 sprays into the nostril(s) as directed by provider Daily. 16 g 5    Glycopyrronium Tosylate 2.4 % pads Apply 1 application topically Daily As Needed (hyperhidrosis). 30 each 5    lisinopril (PRINIVIL,ZESTRIL) 20 MG tablet Take 1 tablet by mouth Daily. for blood pressure 90 tablet 3    multivitamin with minerals tablet tablet Take 1 tablet by mouth Daily.      Nutritional Supplements (Ensure Original) liquid Take 1 can by mouth 2 (Two) Times a Day. 51779 mL 12    omeprazole (priLOSEC) 40 MG capsule TAKE 1 CAPSULE BY MOUTH TWICE DAILY 180 capsule 1    pravastatin (Pravachol) 40 MG tablet Take 1 tablet by mouth Daily. 180 tablet 1    tiZANidine (ZANAFLEX) 4 MG tablet Take 1 tablet by mouth Every 8 (Eight) Hours As Needed for Muscle Spasms. 30 tablet 5    traZODone (DESYREL) 50 MG tablet Take 0.5 tablets by mouth Every Night. (Patient taking differently: Take 1 tablet by mouth As Needed.) 30 tablet 1    venlafaxine XR (EFFEXOR-XR) 150 MG 24 hr capsule Take 1 capsule by mouth Daily. 90 capsule 1    venlafaxine XR (EFFEXOR-XR) 75 MG 24 hr capsule TAKE ONE CAPSULE BY MOUTH EVERY DAY WITH EFFEXOR 150 MG. 90 capsule 1    vitamin B-12 (CYANOCOBALAMIN) 100 MCG tablet Take 50 mcg by mouth Daily.      [DISCONTINUED]  "methylPREDNISolone (MEDROL) 4 MG dose pack Take as directed on package instructions. (Patient not taking: Reported on 6/5/2023) 21 tablet 0    [DISCONTINUED] predniSONE (DELTASONE) 10 MG tablet 6 tabs po qd x 2 days, 4 tabs po qd x 2 days, 3 tabs po qd x 2 days, 2 tabs po qd x 2 days, 1 tab po qd x 2 days (Patient not taking: Reported on 6/5/2023) 32 tablet 0     Current Facility-Administered Medications on File Prior to Visit   Medication Dose Route Frequency Provider Last Rate Last Admin    lidocaine (XYLOCAINE) 2% injection 1 mL  1 mL Injection Once Cull, ELISABETH Vazquez           Objective   Vitals:    06/05/23 1427   BP: 110/72   Pulse: 97   SpO2: 95%   Weight: 67.6 kg (149 lb)   Height: 152.4 cm (60\")     Body mass index is 29.1 kg/m².    Physical Exam  Vitals and nursing note reviewed.   Constitutional:       Appearance: She is well-developed.   HENT:      Head: Normocephalic and atraumatic.   Neck:      Thyroid: No thyromegaly.      Vascular: No JVD.   Cardiovascular:      Rate and Rhythm: Normal rate and regular rhythm.      Heart sounds: Normal heart sounds. No murmur heard.    No friction rub. No gallop.   Pulmonary:      Effort: Pulmonary effort is normal. No respiratory distress.      Breath sounds: Normal breath sounds. No wheezing or rales.   Abdominal:      General: Bowel sounds are normal. There is no distension.      Palpations: Abdomen is soft.      Tenderness: There is no abdominal tenderness. There is no guarding or rebound.   Musculoskeletal:      Cervical back: Neck supple.   Skin:     General: Skin is warm and dry.   Neurological:      Mental Status: She is alert.   Psychiatric:         Behavior: Behavior normal.       Assessment & Plan   Diagnoses and all orders for this visit:    1. Benign essential hypertension (Primary)  -     Comprehensive Metabolic Panel    2. Dyslipidemia  -     Comprehensive Metabolic Panel  -     Lipid Panel    3. Vitamin D deficiency  -     Vitamin " D,25-Hydroxy    4. Normocytic anemia  -     CBC & Differential    5. Ulcerative rectosigmoiditis without complication  -     Comprehensive Metabolic Panel      -hypertension - controlled, continue medications  -HLD - continue statin, recheck lipids;  started repatha last ov  -vitamin D due recheck  -UC quite on humira         BMI is >= 25 and <30. (Overweight) The following options were offered after discussion;: weight loss educational material (shared in after visit summary)     -Follow up: 6 months and prn

## 2023-06-05 NOTE — PATIENT INSTRUCTIONS
Calorie Counting for Weight Loss  Calories are units of energy. Your body needs a certain number of calories from food to keep going throughout the day. When you eat or drink more calories than your body needs, your body stores the extra calories mostly as fat. When you eat or drink fewer calories than your body needs, your body burns fat to get the energy it needs.  Calorie counting means keeping track of how many calories you eat and drink each day. Calorie counting can be helpful if you need to lose weight. If you eat fewer calories than your body needs, you should lose weight. Ask your health care provider what a healthy weight is for you.  For calorie counting to work, you will need to eat the right number of calories each day to lose a healthy amount of weight per week. A dietitian can help you figure out how many calories you need in a day and will suggest ways to reach your calorie goal.  A healthy amount of weight to lose each week is usually 1-2 lb (0.5-0.9 kg). This usually means that your daily calorie intake should be reduced by 500-750 calories.  Eating 1,200-1,500 calories a day can help most women lose weight.  Eating 1,500-1,800 calories a day can help most men lose weight.  What do I need to know about calorie counting?  Work with your health care provider or dietitian to determine how many calories you should get each day. To meet your daily calorie goal, you will need to:  Find out how many calories are in each food that you would like to eat. Try to do this before you eat.  Decide how much of the food you plan to eat.  Keep a food log. Do this by writing down what you ate and how many calories it had.  To successfully lose weight, it is important to balance calorie counting with a healthy lifestyle that includes regular activity.  Where do I find calorie information?  The number of calories in a food can be found on a Nutrition Facts label. If a food does not have a Nutrition Facts label, try to  look up the calories online or ask your dietitian for help.  Remember that calories are listed per serving. If you choose to have more than one serving of a food, you will have to multiply the calories per serving by the number of servings you plan to eat. For example, the label on a package of bread might say that a serving size is 1 slice and that there are 90 calories in a serving. If you eat 1 slice, you will have eaten 90 calories. If you eat 2 slices, you will have eaten 180 calories.  How do I keep a food log?  After each time that you eat, record the following in your food log as soon as possible:  What you ate. Be sure to include toppings, sauces, and other extras on the food.  How much you ate. This can be measured in cups, ounces, or number of items.  How many calories were in each food and drink.  The total number of calories in the food you ate.  Keep your food log near you, such as in a pocket-sized notebook or on an audelia or website on your mobile phone. Some programs will calculate calories for you and show you how many calories you have left to meet your daily goal.  What are some portion-control tips?  Know how many calories are in a serving. This will help you know how many servings you can have of a certain food.  Use a measuring cup to measure serving sizes. You could also try weighing out portions on a kitchen scale. With time, you will be able to estimate serving sizes for some foods.  Take time to put servings of different foods on your favorite plates or in your favorite bowls and cups so you know what a serving looks like.  Try not to eat straight from a food's packaging, such as from a bag or box. Eating straight from the package makes it hard to see how much you are eating and can lead to overeating. Put the amount you would like to eat in a cup or on a plate to make sure you are eating the right portion.  Use smaller plates, glasses, and bowls for smaller portions and to prevent  overeating.  Try not to multitask. For example, avoid watching TV or using your computer while eating. If it is time to eat, sit down at a table and enjoy your food. This will help you recognize when you are full. It will also help you be more mindful of what and how much you are eating.  What are tips for following this plan?  Reading food labels  Check the calorie count compared with the serving size. The serving size may be smaller than what you are used to eating.  Check the source of the calories. Try to choose foods that are high in protein, fiber, and vitamins, and low in saturated fat, trans fat, and sodium.  Shopping  Read nutrition labels while you shop. This will help you make healthy decisions about which foods to buy.  Pay attention to nutrition labels for low-fat or fat-free foods. These foods sometimes have the same number of calories or more calories than the full-fat versions. They also often have added sugar, starch, or salt to make up for flavor that was removed with the fat.  Make a grocery list of lower-calorie foods and stick to it.  Cooking  Try to cook your favorite foods in a healthier way. For example, try baking instead of frying.  Use low-fat dairy products.  Meal planning  Use more fruits and vegetables. One-half of your plate should be fruits and vegetables.  Include lean proteins, such as chicken, turkey, and fish.  Lifestyle  Each week, aim to do one of the followin minutes of moderate exercise, such as walking.  75 minutes of vigorous exercise, such as running.  General information  Know how many calories are in the foods you eat most often. This will help you calculate calorie counts faster.  Find a way of tracking calories that works for you. Get creative. Try different apps or programs if writing down calories does not work for you.  What foods should I eat?    Eat nutritious foods. It is better to have a nutritious, high-calorie food, such as an avocado, than a food with  few nutrients, such as a bag of potato chips.  Use your calories on foods and drinks that will fill you up and will not leave you hungry soon after eating.  Examples of foods that fill you up are nuts and nut butters, vegetables, lean proteins, and high-fiber foods such as whole grains. High-fiber foods are foods with more than 5 g of fiber per serving.  Pay attention to calories in drinks. Low-calorie drinks include water and unsweetened drinks.  The items listed above may not be a complete list of foods and beverages you can eat. Contact a dietitian for more information.  What foods should I limit?  Limit foods or drinks that are not good sources of vitamins, minerals, or protein or that are high in unhealthy fats. These include:  Candy.  Other sweets.  Sodas, specialty coffee drinks, alcohol, and juice.  The items listed above may not be a complete list of foods and beverages you should avoid. Contact a dietitian for more information.  How do I count calories when eating out?  Pay attention to portions. Often, portions are much larger when eating out. Try these tips to keep portions smaller:  Consider sharing a meal instead of getting your own.  If you get your own meal, eat only half of it. Before you start eating, ask for a container and put half of your meal into it.  When available, consider ordering smaller portions from the menu instead of full portions.  Pay attention to your food and drink choices. Knowing the way food is cooked and what is included with the meal can help you eat fewer calories.  If calories are listed on the menu, choose the lower-calorie options.  Choose dishes that include vegetables, fruits, whole grains, low-fat dairy products, and lean proteins.  Choose items that are boiled, broiled, grilled, or steamed. Avoid items that are buttered, battered, fried, or served with cream sauce. Items labeled as crispy are usually fried, unless stated otherwise.  Choose water, low-fat milk,  unsweetened iced tea, or other drinks without added sugar. If you want an alcoholic beverage, choose a lower-calorie option, such as a glass of wine or light beer.  Ask for dressings, sauces, and syrups on the side. These are usually high in calories, so you should limit the amount you eat.  If you want a salad, choose a garden salad and ask for grilled meats. Avoid extra toppings such as zacarias, cheese, or fried items. Ask for the dressing on the side, or ask for olive oil and vinegar or lemon to use as dressing.  Estimate how many servings of a food you are given. Knowing serving sizes will help you be aware of how much food you are eating at restaurants.  Where to find more information  Centers for Disease Control and Prevention: www.cdc.gov  [place] Department of Agriculture: myplate.gov  Summary  Calorie counting means keeping track of how many calories you eat and drink each day. If you eat fewer calories than your body needs, you should lose weight.  A healthy amount of weight to lose per week is usually 1-2 lb (0.5-0.9 kg). This usually means reducing your daily calorie intake by 500-750 calories.  The number of calories in a food can be found on a Nutrition Facts label. If a food does not have a Nutrition Facts label, try to look up the calories online or ask your dietitian for help.  Use smaller plates, glasses, and bowls for smaller portions and to prevent overeating.  Use your calories on foods and drinks that will fill you up and not leave you hungry shortly after a meal.  This information is not intended to replace advice given to you by your health care provider. Make sure you discuss any questions you have with your health care provider.  Document Revised: 01/28/2021 Document Reviewed: 01/28/2021  Elsevier Patient Education © 2022 [employer] Inc[employer]    Exercising to Lose Weight  Getting regular exercise is important for everyone. It is especially important if you are overweight. Being overweight increases  your risk of heart disease, stroke, diabetes, high blood pressure, and several types of cancer. Exercising, and reducing the calories you consume, can help you lose weight and improve fitness and health.  Exercise can be moderate or vigorous intensity. To lose weight, most people need to do a certain amount of moderate or vigorous-intensity exercise each week.  How can exercise affect me?  You lose weight when you exercise enough to burn more calories than you eat. Exercise also reduces body fat and builds muscle. The more muscle you have, the more calories you burn. Exercise also:  Improves mood.  Reduces stress and tension.  Improves your overall fitness, flexibility, and endurance.  Increases bone strength.  Moderate-intensity exercise  Moderate-intensity exercise is any activity that gets you moving enough to burn at least three times more energy (calories) than if you were sitting.  Examples of moderate exercise include:  Walking a mile in 15 minutes.  Doing light yard work.  Biking at an easy pace.  Most people should get at least 150 minutes of moderate-intensity exercise a week to maintain their body weight.  Vigorous-intensity exercise  Vigorous-intensity exercise is any activity that gets you moving enough to burn at least six times more calories than if you were sitting. When you exercise at this intensity, you should be working hard enough that you are not able to carry on a conversation.  Examples of vigorous exercise include:  Running.  Playing a team sport, such as football, basketball, and soccer.  Jumping rope.  Most people should get at least 75 minutes a week of vigorous exercise to maintain their body weight.  What actions can I take to lose weight?  The amount of exercise you need to lose weight depends on:  Your age.  The type of exercise.  Any health conditions you have.  Your overall physical ability.  Talk to your health care provider about how much exercise you need and what types of  activities are safe for you.  Nutrition    Make changes to your diet as told by your health care provider or diet and nutrition specialist (dietitian). This may include:  Eating fewer calories.  Eating more protein.  Eating less unhealthy fats.  Eating a diet that includes fresh fruits and vegetables, whole grains, low-fat dairy products, and lean protein.  Avoiding foods with added fat, salt, and sugar.  Drink plenty of water while you exercise to prevent dehydration or heat stroke.  Activity  Choose an activity that you enjoy and set realistic goals. Your health care provider can help you make an exercise plan that works for you.  Exercise at a moderate or vigorous intensity most days of the week.  The intensity of exercise may vary from person to person. You can tell how intense a workout is for you by paying attention to your breathing and heartbeat. Most people will notice their breathing and heartbeat get faster with more intense exercise.  Do resistance training twice each week, such as:  Push-ups.  Sit-ups.  Lifting weights.  Using resistance bands.  Getting short amounts of exercise can be just as helpful as long, structured periods of exercise. If you have trouble finding time to exercise, try doing these things as part of your daily routine:  Get up, stretch, and walk around every 30 minutes throughout the day.  Go for a walk during your lunch break.  Park your car farther away from your destination.  If you take public transportation, get off one stop early and walk the rest of the way.  Make phone calls while standing up and walking around.  Take the stairs instead of elevators or escalators.  Wear comfortable clothes and shoes with good support.  Do not exercise so much that you hurt yourself, feel dizzy, or get very short of breath.  Where to find more information  [place] Department of Health and Human Services: www.hhs.gov  Centers for Disease Control and Prevention: www.cdc.gov  Contact a health care  provider:  Before starting a new exercise program.  If you have questions or concerns about your weight.  If you have a medical problem that keeps you from exercising.  Get help right away if:  You have any of the following while exercising:  Injury.  Dizziness.  Difficulty breathing or shortness of breath that does not go away when you stop exercising.  Chest pain.  Rapid heartbeat.  These symptoms may represent a serious problem that is an emergency. Do not wait to see if the symptoms will go away. Get medical help right away. Call your local emergency services (911 in the [place] Do not drive yourself to the hospital.  Summary  Getting regular exercise is especially important if you are overweight.  Being overweight increases your risk of heart disease, stroke, diabetes, high blood pressure, and several types of cancer.  Losing weight happens when you burn more calories than you eat.  Reducing the amount of calories you eat, and getting regular moderate or vigorous exercise each week, helps you lose weight.  This information is not intended to replace advice given to you by your health care provider. Make sure you discuss any questions you have with your health care provider.  Document Revised: 02/13/2022 Document Reviewed: 02/13/2022  Elsevier Patient Education © 2022 [employer].

## 2023-06-06 LAB
25(OH)D3+25(OH)D2 SERPL-MCNC: 29.8 NG/ML (ref 30–100)
ALBUMIN SERPL-MCNC: 4.2 G/DL (ref 3.8–4.8)
ALBUMIN/GLOB SERPL: 1.4 {RATIO} (ref 1.2–2.2)
ALP SERPL-CCNC: 98 IU/L (ref 44–121)
ALT SERPL-CCNC: 31 IU/L (ref 0–32)
AST SERPL-CCNC: 28 IU/L (ref 0–40)
BASOPHILS # BLD AUTO: 0 X10E3/UL (ref 0–0.2)
BASOPHILS NFR BLD AUTO: 0 %
BILIRUB SERPL-MCNC: <0.2 MG/DL (ref 0–1.2)
BUN SERPL-MCNC: 16 MG/DL (ref 8–27)
BUN/CREAT SERPL: 26 (ref 12–28)
CALCIUM SERPL-MCNC: 9.7 MG/DL (ref 8.7–10.3)
CHLORIDE SERPL-SCNC: 104 MMOL/L (ref 96–106)
CHOLEST SERPL-MCNC: 327 MG/DL (ref 100–199)
CO2 SERPL-SCNC: 26 MMOL/L (ref 20–29)
CREAT SERPL-MCNC: 0.62 MG/DL (ref 0.57–1)
EGFRCR SERPLBLD CKD-EPI 2021: 100 ML/MIN/1.73
EOSINOPHIL # BLD AUTO: 0.1 X10E3/UL (ref 0–0.4)
EOSINOPHIL NFR BLD AUTO: 1 %
ERYTHROCYTE [DISTWIDTH] IN BLOOD BY AUTOMATED COUNT: 12.3 % (ref 11.7–15.4)
GLOBULIN SER CALC-MCNC: 3 G/DL (ref 1.5–4.5)
GLUCOSE SERPL-MCNC: 132 MG/DL (ref 70–99)
HCT VFR BLD AUTO: 38.4 % (ref 34–46.6)
HDLC SERPL-MCNC: 43 MG/DL
HGB BLD-MCNC: 12.6 G/DL (ref 11.1–15.9)
IMM GRANULOCYTES # BLD AUTO: 0 X10E3/UL (ref 0–0.1)
IMM GRANULOCYTES NFR BLD AUTO: 0 %
LDLC SERPL CALC-MCNC: 184 MG/DL (ref 0–99)
LYMPHOCYTES # BLD AUTO: 2.7 X10E3/UL (ref 0.7–3.1)
LYMPHOCYTES NFR BLD AUTO: 38 %
MCH RBC QN AUTO: 29.4 PG (ref 26.6–33)
MCHC RBC AUTO-ENTMCNC: 32.8 G/DL (ref 31.5–35.7)
MCV RBC AUTO: 90 FL (ref 79–97)
MONOCYTES # BLD AUTO: 0.7 X10E3/UL (ref 0.1–0.9)
MONOCYTES NFR BLD AUTO: 10 %
NEUTROPHILS # BLD AUTO: 3.6 X10E3/UL (ref 1.4–7)
NEUTROPHILS NFR BLD AUTO: 51 %
PLATELET # BLD AUTO: 301 X10E3/UL (ref 150–450)
POTASSIUM SERPL-SCNC: 4.8 MMOL/L (ref 3.5–5.2)
PROT SERPL-MCNC: 7.2 G/DL (ref 6–8.5)
RBC # BLD AUTO: 4.28 X10E6/UL (ref 3.77–5.28)
SODIUM SERPL-SCNC: 144 MMOL/L (ref 134–144)
TRIGL SERPL-MCNC: 488 MG/DL (ref 0–149)
VLDLC SERPL CALC-MCNC: 100 MG/DL (ref 5–40)
WBC # BLD AUTO: 7.2 X10E3/UL (ref 3.4–10.8)

## 2023-06-11 NOTE — PROGRESS NOTES
Call results to patient.  Kidney and liver function normal  Total cholesterol did come down from 440 to 327 and  to 184, which is improvement though still high.  I would continue repatha and increase pravastatin to 80 mg po daily, will only improve a little but every amount can would be good since intolerant to other statins.

## 2023-06-13 DIAGNOSIS — E78.01 FAMILIAL HYPERCHOLESTEREMIA: ICD-10-CM

## 2023-06-13 DIAGNOSIS — E78.1 HIGH TRIGLYCERIDES: ICD-10-CM

## 2023-06-13 DIAGNOSIS — E78.5 DYSLIPIDEMIA: Primary | ICD-10-CM

## 2023-06-13 RX ORDER — PRAVASTATIN SODIUM 80 MG/1
80 TABLET ORAL DAILY
COMMUNITY
End: 2023-06-13 | Stop reason: SDUPTHER

## 2023-06-13 RX ORDER — PRAVASTATIN SODIUM 80 MG/1
80 TABLET ORAL DAILY
Qty: 90 TABLET | Refills: 3 | Status: SHIPPED | OUTPATIENT
Start: 2023-06-13 | End: 2023-06-15 | Stop reason: ALTCHOICE

## 2023-06-15 ENCOUNTER — TELEPHONE (OUTPATIENT)
Dept: FAMILY MEDICINE CLINIC | Facility: CLINIC | Age: 63
End: 2023-06-15
Payer: MEDICARE

## 2023-06-15 RX ORDER — PRAVASTATIN SODIUM 40 MG
80 TABLET ORAL DAILY
Qty: 180 TABLET | Refills: 3 | Status: SHIPPED | OUTPATIENT
Start: 2023-06-15

## 2023-06-15 NOTE — TELEPHONE ENCOUNTER
Caller: Marizol Park    Relationship: Self    Best call back number: [phone]     What was the call regarding: PATIENT STATED THAT SHE DECLINED TAKING INJECTIONS FOR CHOLESTEROL BECAUSE THEY WERE $260 AN INJECTION. PATIENT STATED HER CHOLESTEROL HAS GONE DOWN AND SHE IS WANTING TO KNOW IF THE INJECTIONS ARE STILL NEEDED. PLEASE ADVISE.

## 2023-06-15 NOTE — TELEPHONE ENCOUNTER
Caller: Marizol Park    Relationship: Self    Best call back number: [phone]     What was the call regarding: PATIENT ASKING IF DR AZMORA CAN SEND IN A PRESCRIPTION FOR THE PRAVASTATIN 40 MG TAKING TWO A DAY INSTEAD OF THE 80 MG SINCE THE 80 MG IS A BIGGER PILL. PATIENT IS OUT OF MEDICATION. PLEASE ADVISE.    PHARMACY:    [facility] #[id number] [place], [place] - [address] AT [facility] [phone] [facility] [phone]

## 2023-08-25 ENCOUNTER — TELEPHONE (OUTPATIENT)
Dept: FAMILY MEDICINE CLINIC | Facility: CLINIC | Age: 63
End: 2023-08-25
Payer: MEDICARE

## 2023-08-25 RX ORDER — LISINOPRIL 10 MG/1
10 TABLET ORAL DAILY
Qty: 30 TABLET | Refills: 5 | Status: SHIPPED | OUTPATIENT
Start: 2023-08-25

## 2023-08-25 NOTE — TELEPHONE ENCOUNTER
Caller: Mairzol Park ANGELES    Relationship: Self    Best call back number: [phone]    What medications are you currently taking:   Current Outpatient Medications on File Prior to Visit   Medication Sig Dispense Refill    adalimumab (HUMIRA) 40 MG/0.8ML Prefilled Syringe Kit injection Inject 0.8 mL under the skin into the appropriate area as directed Every 14 (Fourteen) Days. 2 each 5    albuterol sulfate  (90 Base) MCG/ACT inhaler INHALE 2 PUFFS BY MOUTH EVERY 4 HOURS AS NEEDED FOR WHEEZING OR SHORTNESS OF BREATH 8.5 g 5    Apoaequorin (Prevagen) 10 MG capsule Take  by mouth.      B Complex Vitamins (vitamin b complex) capsule capsule Take 1 capsule by mouth Daily.      calcium citrate-vitamin d (CITRACAL) 200-250 MG-UNIT tablet tablet Take  by mouth Daily.      cetirizine (zyrTEC) 10 MG tablet Take 1 tablet by mouth Daily. 90 tablet 0    Evolocumab (REPATHA) solution auto-injector SureClick injection Inject 1 mL under the skin into the appropriate area as directed Every 14 (Fourteen) Days. 1 mL 3    fluticasone (FLONASE) 50 MCG/ACT nasal spray 2 sprays into the nostril(s) as directed by provider Daily. 16 g 5    Glycopyrronium Tosylate 2.4 % pads Apply 1 application topically Daily As Needed (hyperhidrosis). 30 each 5    lisinopril (PRINIVIL,ZESTRIL) 20 MG tablet Take 1 tablet by mouth Daily. for blood pressure 90 tablet 3    multivitamin with minerals tablet tablet Take 1 tablet by mouth Daily.      Nutritional Supplements (Ensure Original) liquid Take 1 can by mouth 2 (Two) Times a Day. 02747 mL 12    omeprazole (priLOSEC) 40 MG capsule TAKE 1 CAPSULE BY MOUTH TWICE DAILY 180 capsule 1    pravastatin (Pravachol) 40 MG tablet Take 2 tablets by mouth Daily. 180 tablet 3    tiZANidine (ZANAFLEX) 4 MG tablet Take 1 tablet by mouth Every 8 (Eight) Hours As Needed for Muscle Spasms. 30 tablet 5    traZODone (DESYREL) 50 MG tablet Take 0.5 tablets by mouth Every Night. (Patient taking differently: Take 1 tablet by  mouth As Needed.) 30 tablet 1    venlafaxine XR (EFFEXOR-XR) 150 MG 24 hr capsule TAKE 1 CAPSULE BY MOUTH DAILY 90 capsule 1    venlafaxine XR (EFFEXOR-XR) 75 MG 24 hr capsule TAKE ONE CAPSULE BY MOUTH EVERY DAY WITH EFFEXOR 150 MG. 90 capsule 1    vitamin B-12 (CYANOCOBALAMIN) 100 MCG tablet Take 50 mcg by mouth Daily.       Current Facility-Administered Medications on File Prior to Visit   Medication Dose Route Frequency Provider Last Rate Last Admin    lidocaine (XYLOCAINE) 2% injection 1 mL  1 mL Injection Once Jovita Auguste APRN              When did you start taking these medications: MORE THAN A YEAR    Which medication are you concerned about:     lisinopril (PRINIVIL,ZESTRIL) 20 MG tablet     Who prescribed you this medication: DR ZAMORA    What are your concerns: PATIENT GOT UP FROM A CHAIR YESTERDAY AND WAS LIGHTHEADED AND DIZZY.  BLOOD PRESSURE WAS 56/?, TOOK IT A FEW MORE TIMES EACH TIME GETTING BETTER 70/?    PATIENT STATED THAT HER BLOOD PRESSURE IS NORMAL TODAY BUT SHE HAS NOT TAKEN HER MEDICATION TODAY AS OF YET.    How long have you had these concerns: SINCE YESTERDAY    PATIENT HAS TWO DAYS LEFT OF CURRENT MEDICATION.    PATIENT IS ASKING IF SHE SHOULD CONTINUE TAKING IT OR WHAT SHE SHOULD DO.      PATIENT STATED THERE WAS A NURSE AT THE HOUSE WHEN THIS HAPPENED.  PATIENT HAS BEEN EATING LESS AND HAD NOT HAD ANYTHING EXCEPT AN ENSURE FOR BREAKFAST AND A CHOCOLATE BROWNIE AND THE TIME FRAME WAS APPROX AT 5 PM WHEN SHE FELT LIKE THIS.      [facility] #[id number] - [place], [place] - [address] AT [facility] - [phone] [facility] [phone]  [phone]

## 2023-09-04 DIAGNOSIS — R61 HYPERHIDROSIS: ICD-10-CM

## 2023-09-05 RX ORDER — METHYLPREDNISOLONE 4 MG/1
TABLET ORAL
Qty: 21 EACH | OUTPATIENT
Start: 2023-09-05

## 2023-09-05 RX ORDER — PREDNISONE 10 MG/1
TABLET ORAL
Qty: 32 TABLET | Refills: 0 | OUTPATIENT
Start: 2023-09-05

## 2023-09-05 RX ORDER — GLYCOPYRRONIUM 2.4 G/100G
CLOTH TOPICAL
Qty: 30 EACH | Refills: 5 | Status: SHIPPED | OUTPATIENT
Start: 2023-09-05

## 2023-09-07 ENCOUNTER — TELEPHONE (OUTPATIENT)
Dept: FAMILY MEDICINE CLINIC | Facility: CLINIC | Age: 63
End: 2023-09-07

## 2023-09-07 RX ORDER — TIZANIDINE 4 MG/1
TABLET ORAL
Qty: 90 TABLET | Refills: 5 | Status: SHIPPED | OUTPATIENT
Start: 2023-09-07

## 2023-09-07 NOTE — TELEPHONE ENCOUNTER
Caller: Marizol Park    Relationship: Self    Best call back number: [phone]     What is the best time to reach you: ANY    Who are you requesting to speak with (clinical staff, provider,  specific staff member): CLINICAL STAFF    Do you know the name of the person who called:      What was the call regarding: PATIENT CALLED AND STATED PHARMACY FILLED PRESCRIPTION FOR MEDICATION     pravastatin (Pravachol) 40 MG tablet   ONCE PER DAY.  AT HER LAST VISIT IN JUNE DR ZAMORA CHANGED IT TO 80 MG ONCE DAY.  SHE IS WANTING TO KNOW IF HOW SHE SHOULD TAKE THE MEDICATION IF SHE CAN TAKE ONE PILL OF THE 40 MG AND SHE HAS SAME OF THIS MEDICATION IN 80 MG WANTS TO KNOW IF SHE NEEDS TO CUT IN HALF AND TAKE IT A DAY.  THE 80 MG PILLS ARE SO BIG THAT SHE CAN'T SWALLOW THE BIG PILLS. PLEASE CALL AND LET HER KNOW HOW TO TAKE THE MEDICATION.        Is it okay if the provider responds through MyChart:

## 2023-09-07 NOTE — TELEPHONE ENCOUNTER
Pt called states she is supposed to be on 80mg of Pravastatin and she also has 40mg tabs she can't swallow the 80mg tabs due to how big the pill is wants to know is she can take 2 40mg tabs or cut the 80mg tab in half

## 2023-09-08 NOTE — TELEPHONE ENCOUNTER
Pt aware of medications and to take 2 40mg tabs or cut the 80mg in 1/2 and take 2 of the 1/2 tabs

## 2023-09-11 DIAGNOSIS — E78.5 DYSLIPIDEMIA: ICD-10-CM

## 2023-09-11 RX ORDER — PRAVASTATIN SODIUM 80 MG/1
80 TABLET ORAL DAILY
Qty: 90 TABLET | Refills: 1 | OUTPATIENT
Start: 2023-09-11

## 2023-09-24 DIAGNOSIS — F41.9 ANXIETY: ICD-10-CM

## 2023-09-25 RX ORDER — VENLAFAXINE HYDROCHLORIDE 75 MG/1
CAPSULE, EXTENDED RELEASE ORAL
Qty: 90 CAPSULE | Refills: 1 | Status: SHIPPED | OUTPATIENT
Start: 2023-09-25

## 2023-10-20 ENCOUNTER — OFFICE (OUTPATIENT)
Dept: URBAN - METROPOLITAN AREA CLINIC 65 | Facility: CLINIC | Age: 63
End: 2023-10-20

## 2023-10-20 VITALS
HEART RATE: 99 BPM | WEIGHT: 145 LBS | HEIGHT: 60 IN | DIASTOLIC BLOOD PRESSURE: 70 MMHG | SYSTOLIC BLOOD PRESSURE: 112 MMHG

## 2023-10-20 DIAGNOSIS — K51.50 LEFT SIDED COLITIS WITHOUT COMPLICATIONS: ICD-10-CM

## 2023-10-20 DIAGNOSIS — Z92.25 PERSONAL HISTORY OF IMMUNOSUPPRESSION THERAPY: ICD-10-CM

## 2023-10-20 DIAGNOSIS — K21.9 GASTRO-ESOPHAGEAL REFLUX DISEASE WITHOUT ESOPHAGITIS: ICD-10-CM

## 2023-10-20 DIAGNOSIS — Z80.0 FAMILY HISTORY OF MALIGNANT NEOPLASM OF DIGESTIVE ORGANS: ICD-10-CM

## 2023-10-20 PROCEDURE — 99214 OFFICE O/P EST MOD 30 MIN: CPT | Performed by: INTERNAL MEDICINE

## 2023-10-23 ENCOUNTER — OFFICE VISIT (OUTPATIENT)
Dept: FAMILY MEDICINE CLINIC | Facility: CLINIC | Age: 63
End: 2023-10-23
Payer: MEDICARE

## 2023-10-23 VITALS
WEIGHT: 148 LBS | SYSTOLIC BLOOD PRESSURE: 116 MMHG | HEART RATE: 100 BPM | HEIGHT: 60 IN | DIASTOLIC BLOOD PRESSURE: 64 MMHG | BODY MASS INDEX: 29.06 KG/M2 | OXYGEN SATURATION: 96 %

## 2023-10-23 DIAGNOSIS — F41.1 GAD (GENERALIZED ANXIETY DISORDER): ICD-10-CM

## 2023-10-23 DIAGNOSIS — G60.9 IDIOPATHIC PERIPHERAL NEUROPATHY: Primary | ICD-10-CM

## 2023-10-23 DIAGNOSIS — F41.1 GAD (GENERALIZED ANXIETY DISORDER): Primary | ICD-10-CM

## 2023-10-23 DIAGNOSIS — M79.672 ACUTE FOOT PAIN, LEFT: ICD-10-CM

## 2023-10-23 DIAGNOSIS — Z23 NEED FOR INFLUENZA VACCINATION: ICD-10-CM

## 2023-10-23 DIAGNOSIS — Z23 NEED FOR COVID-19 VACCINE: ICD-10-CM

## 2023-10-23 PROBLEM — K57.92 DIVERTICULITIS: Status: ACTIVE | Noted: 2023-10-23

## 2023-10-23 PROBLEM — K57.30 DIVERTICULOSIS OF COLON: Status: ACTIVE | Noted: 2023-10-23

## 2023-10-23 PROBLEM — A04.9 BACTERIAL INTESTINAL INFECTION, UNSPECIFIED: Status: ACTIVE | Noted: 2023-10-23

## 2023-10-23 PROBLEM — Z92.25 PERSONAL HISTORY OF IMMUNOSUPPRESSION THERAPY: Status: ACTIVE | Noted: 2023-10-23

## 2023-10-23 PROBLEM — I10 HIGH BLOOD PRESSURE: Status: ACTIVE | Noted: 2023-10-23

## 2023-10-23 PROBLEM — Z80.0 FAMILY HISTORY OF COLON CANCER: Status: ACTIVE | Noted: 2023-10-23

## 2023-10-23 PROBLEM — K62.5 RECTAL BLEEDING: Status: ACTIVE | Noted: 2023-10-23

## 2023-10-23 PROBLEM — K51.50 CHRONIC LEFT-SIDED ULCERATIVE COLITIS: Status: ACTIVE | Noted: 2023-10-23

## 2023-10-23 PROBLEM — R19.7 DIARRHEA: Status: ACTIVE | Noted: 2023-10-23

## 2023-10-23 PROBLEM — R30.9 PAINFUL MICTURITION, UNSPECIFIED: Status: ACTIVE | Noted: 2023-10-23

## 2023-10-23 PROBLEM — K51.90 ULCERATIVE COLITIS, UNSPECIFIED, WITHOUT COMPLICATIONS: Status: ACTIVE | Noted: 2023-10-23

## 2023-10-23 RX ORDER — TRAZODONE HYDROCHLORIDE 50 MG/1
25 TABLET ORAL NIGHTLY
Start: 2023-10-23 | End: 2023-10-23 | Stop reason: SDUPTHER

## 2023-10-23 RX ORDER — TRAZODONE HYDROCHLORIDE 50 MG/1
25 TABLET ORAL NIGHTLY
Start: 2023-10-23 | End: 2023-10-27 | Stop reason: SDUPTHER

## 2023-10-23 NOTE — PROGRESS NOTES
Subjective   Marizol Park is a 63 y.o. female.     Chief Complaint   Patient presents with    Numbness     Toes x 3-4 days     History of Present Illness     Patient states that her toes on both her feet have been feeling numb and tingly since Thursday. States that this is a new problem. Denies changes in medication. Hx of anemia, RLS, and peripheral neuropathy. Most recent labs done June of 2023. She is taking OTC vitamin b12. Pt states that she also has upcoming appt with Dr. Mcconnell, and would like to do labs at that time.    She also complains of pain on the outside of her left foot due to unknown injury.  She stated that she has had a bruise for 2 weeks and that the area is still bothering her.  She does feel that the bruise has improved.     The following portions of the patient's history were reviewed and updated as appropriate: allergies, current medications, past family history, past medical history, past social history, past surgical history and problem list.    Review of Systems   Denies dizziness, fatigue, fever/chills, CP, SOA, headache, blood in urine/stool, abd pain, leg swelling.    Objective     Vitals:    10/23/23 1330   BP: 116/64   Pulse: 100   SpO2: 96%      Body mass index is 28.9 kg/m².    Physical Exam  Constitutional:       General: She is not in acute distress.     Appearance: Normal appearance. She is not ill-appearing or toxic-appearing.   HENT:      Right Ear: External ear normal.      Left Ear: External ear normal.      Nose: No congestion or rhinorrhea.      Mouth/Throat:      Mouth: Mucous membranes are moist.      Pharynx: Oropharynx is clear.   Eyes:      Conjunctiva/sclera: Conjunctivae normal.   Cardiovascular:      Rate and Rhythm: Normal rate.      Pulses: Normal pulses.      Heart sounds: Normal heart sounds.   Pulmonary:      Effort: Pulmonary effort is normal. No respiratory distress.      Breath sounds: Normal breath sounds.   Abdominal:      General: Bowel sounds are normal.       Palpations: Abdomen is soft.      Tenderness: There is no abdominal tenderness.   Musculoskeletal:         General: Signs of injury (left foot, small area of mild swelling, no bruise) present.   Skin:     General: Skin is warm and dry.      Capillary Refill: Capillary refill takes less than 2 seconds.   Neurological:      General: No focal deficit present.      Mental Status: She is alert and oriented to person, place, and time.      Motor: No weakness.      Coordination: Coordination normal.      Gait: Gait normal.   Psychiatric:         Behavior: Behavior normal.       Assessment & Plan   Diagnoses and all orders for this visit:    1. Idiopathic peripheral neuropathy (Primary)    2. Acute foot pain, left    3. AUREA (generalized anxiety disorder)  -     traZODone (DESYREL) 50 MG tablet; Take 0.5 tablets by mouth Every Night.    4. Need for influenza vaccination  -     Fluzone >6 Months ([id number])    5. Need for COVID-19 vaccine  -     COVID-19 F23 (Pfizer) 12yrs+ (COMIRNATY)      Plan:     Try wearing compression socks to promote bloodflow in the legs.  Return if pain persists in left foot for longer than 1 month.  Return in December for lab recheck.    Discussed Care Gaps, ordered referrals and encouraged vaccination updates.       - Pt agrees with plan of care and denies further questions/concerns today  - This document is intended for medical expert use only. Persons  reading this document without medical staff guidance may result in misinterpretation and unintended morbidity     Go to the ER for any possible life-threatening symptoms such as chest pain or shortness of air.      Please allow 3-5 business days for recommendations based on new results      I personally spent time with this patient, preparing for the visit, reviewing tests, obtaining and/or reviewing a separately obtained history, performing a medically appropriate examination and/or evaluation, counseling and educating the  patient/family/caregiver, ordering medications,  documenting information in the medical record and indepentently interpreting results.

## 2023-10-27 ENCOUNTER — TELEPHONE (OUTPATIENT)
Dept: FAMILY MEDICINE CLINIC | Facility: CLINIC | Age: 63
End: 2023-10-27
Payer: MEDICARE

## 2023-10-27 DIAGNOSIS — F41.1 GAD (GENERALIZED ANXIETY DISORDER): ICD-10-CM

## 2023-10-27 RX ORDER — TRAZODONE HYDROCHLORIDE 50 MG/1
25 TABLET ORAL NIGHTLY
Qty: 15 TABLET | Refills: 1
Start: 2023-10-27 | End: 2023-10-30 | Stop reason: SDUPTHER

## 2023-10-30 DIAGNOSIS — F41.1 GAD (GENERALIZED ANXIETY DISORDER): ICD-10-CM

## 2023-10-30 RX ORDER — TRAZODONE HYDROCHLORIDE 50 MG/1
25 TABLET ORAL NIGHTLY
Qty: 30 TABLET | Refills: 1
Start: 2023-10-30 | End: 2023-10-30 | Stop reason: SDUPTHER

## 2023-10-30 RX ORDER — TRAZODONE HYDROCHLORIDE 50 MG/1
25 TABLET ORAL NIGHTLY
Start: 2023-10-30

## 2023-10-30 NOTE — TELEPHONE ENCOUNTER
Caller: Marizol Pakr    Relationship: Self    Best call back number: [phone]    Requested Prescriptions:   Requested Prescriptions     Pending Prescriptions Disp Refills    traZODone (DESYREL) 50 MG tablet       Sig: Take 0.5 tablets by mouth Every Night.        Pharmacy where request should be sent: [facility] #[id number] [facility] [address] [facility] AT [facility] [phone] [facility] [phone] FX     Last office visit with prescribing clinician: 6/5/2023   Last telemedicine visit with prescribing clinician: Visit date not found   Next office visit with prescribing clinician: 12/11/2023     Additional details provided by patient: PREVIOUS PRESCRIPTION DIDN'T GO THROUGH    Does the patient have less than a 3 day supply:  [x] Yes  [] No    Would you like a call back once the refill request has been completed: [] Yes [x] No    If the office needs to give you a call back, can they leave a voicemail: [] Yes [x] No    Christie Purvis Rep   10/30/23 15:55 EDT

## 2023-11-25 DIAGNOSIS — K21.9 GASTROESOPHAGEAL REFLUX DISEASE, UNSPECIFIED WHETHER ESOPHAGITIS PRESENT: ICD-10-CM

## 2023-11-26 RX ORDER — OMEPRAZOLE 40 MG/1
CAPSULE, DELAYED RELEASE ORAL
Qty: 180 CAPSULE | Refills: 1 | Status: SHIPPED | OUTPATIENT
Start: 2023-11-26

## 2023-12-07 ENCOUNTER — TELEPHONE (OUTPATIENT)
Dept: FAMILY MEDICINE CLINIC | Facility: CLINIC | Age: 63
End: 2023-12-07
Payer: MEDICARE

## 2023-12-07 RX ORDER — PRAVASTATIN SODIUM 80 MG/1
40 TABLET ORAL NIGHTLY
Qty: 45 TABLET | Refills: 1 | Status: SHIPPED | OUTPATIENT
Start: 2023-12-07

## 2023-12-07 NOTE — TELEPHONE ENCOUNTER
Caller: Marizol Park    Relationship: Self    Best call back number: [phone]     Which medication are you concerned about: pravastatin (Pravachol) 40 MG tablet     Who prescribed you this medication: DR. ZAMORA    What are your concerns: PATIENT STATES SHE WAS PRESCRIBED 40 MG TABLETS, BUT THEY WERE TOO BIG, SO THE PHARMACY MADE THEM SMALLER AND MADE IT 80MG. PATIENT STATES THE 80 MG ARE TOO BIG AND HAS PLENTY OF THEM. SHE STATES SHE HAS BEEN CUTTING THE 80 MG IN HALF, TO MAKE IT 40 MG. PATIENT STATES SHE WANTED TO MAKE DR. ZAMORA AWARE AND IF THERE IS A WAY THEY CAN MAKE THE 80 MG SMALLER.       PLEASE ADVISE

## 2023-12-31 DIAGNOSIS — F41.1 GAD (GENERALIZED ANXIETY DISORDER): ICD-10-CM

## 2024-01-02 RX ORDER — TRAZODONE HYDROCHLORIDE 50 MG/1
25 TABLET ORAL
Qty: 15 TABLET | Refills: 2 | Status: SHIPPED | OUTPATIENT
Start: 2024-01-02

## 2024-02-19 RX ORDER — LISINOPRIL 10 MG/1
10 TABLET ORAL DAILY
Qty: 30 TABLET | Refills: 5 | Status: SHIPPED | OUTPATIENT
Start: 2024-02-19

## 2024-02-29 DIAGNOSIS — F41.9 ANXIETY: ICD-10-CM

## 2024-02-29 DIAGNOSIS — F41.0 PANIC DISORDER WITHOUT AGORAPHOBIA: ICD-10-CM

## 2024-02-29 RX ORDER — VENLAFAXINE HYDROCHLORIDE 150 MG/1
150 CAPSULE, EXTENDED RELEASE ORAL DAILY
Qty: 90 CAPSULE | Refills: 1 | Status: SHIPPED | OUTPATIENT
Start: 2024-02-29 | End: 2024-03-04

## 2024-03-03 DIAGNOSIS — F41.9 ANXIETY: ICD-10-CM

## 2024-03-03 DIAGNOSIS — F41.0 PANIC DISORDER WITHOUT AGORAPHOBIA: ICD-10-CM

## 2024-03-04 ENCOUNTER — OFFICE VISIT (OUTPATIENT)
Dept: FAMILY MEDICINE CLINIC | Facility: CLINIC | Age: 64
End: 2024-03-04
Payer: MEDICARE

## 2024-03-04 VITALS
SYSTOLIC BLOOD PRESSURE: 100 MMHG | WEIGHT: 149 LBS | DIASTOLIC BLOOD PRESSURE: 68 MMHG | BODY MASS INDEX: 29.25 KG/M2 | HEIGHT: 60 IN | OXYGEN SATURATION: 94 % | HEART RATE: 80 BPM

## 2024-03-04 DIAGNOSIS — K51.30 ULCERATIVE RECTOSIGMOIDITIS WITHOUT COMPLICATION: ICD-10-CM

## 2024-03-04 DIAGNOSIS — Z00.00 MEDICARE ANNUAL WELLNESS VISIT, SUBSEQUENT: Primary | ICD-10-CM

## 2024-03-04 DIAGNOSIS — F41.1 GAD (GENERALIZED ANXIETY DISORDER): ICD-10-CM

## 2024-03-04 DIAGNOSIS — I10 BENIGN ESSENTIAL HYPERTENSION: ICD-10-CM

## 2024-03-04 DIAGNOSIS — E55.9 VITAMIN D DEFICIENCY: ICD-10-CM

## 2024-03-04 DIAGNOSIS — M10.09 ACUTE IDIOPATHIC GOUT OF MULTIPLE SITES: ICD-10-CM

## 2024-03-04 DIAGNOSIS — E78.5 DYSLIPIDEMIA: ICD-10-CM

## 2024-03-04 DIAGNOSIS — E66.3 OVERWEIGHT (BMI 25.0-29.9): ICD-10-CM

## 2024-03-04 RX ORDER — VENLAFAXINE HYDROCHLORIDE 75 MG/1
CAPSULE, EXTENDED RELEASE ORAL
Qty: 90 CAPSULE | Refills: 1 | Status: SHIPPED | OUTPATIENT
Start: 2024-03-04

## 2024-03-04 RX ORDER — VENLAFAXINE HYDROCHLORIDE 150 MG/1
150 CAPSULE, EXTENDED RELEASE ORAL DAILY
Qty: 90 CAPSULE | Refills: 1 | Status: SHIPPED | OUTPATIENT
Start: 2024-03-04

## 2024-03-04 NOTE — PATIENT INSTRUCTIONS
Advance Care Planning and Advance Directives     You make decisions on a daily basis - decisions about where you want to live, your career, your home, your life. Perhaps one of the most important decisions you face is your choice for future medical care. Take time to talk with your family and your healthcare team and start planning today.  Advance Care Planning is a process that can help you:  Understand possible future healthcare decisions in light of your own experiences  Reflect on those decision in light of your goals and values  Discuss your decisions with those closest to you and the healthcare professionals that care for you  Make a plan by creating a document that reflects your wishes    Surrogate Decision Maker  In the event of a medical emergency, which has left you unable to communicate or to make your own decisions, you would need someone to make decisions for you.  It is important to discuss your preferences for medical treatment with this person while you are in good health.     Qualities of a surrogate decision maker:  Willing to take on this role and responsibility  Knows what you want for future medical care  Willing to follow your wishes even if they don't agree with them  Able to make difficult medical decisions under stressful circumstances    Advance Directives  These are legal documents you can create that will guide your healthcare team and decision maker(s) when needed. These documents can be stored in the electronic medical record.    Living Will - a legal document to guide your care if you have a terminal condition or a serious illness and are unable to communicate. States vary by statute in document names/types, but most forms may include one or more of the following:        -  Directions regarding life-prolonging treatments        -  Directions regarding artificially provided nutrition/hydration        -  Choosing a healthcare decision maker        -  Direction regarding organ/tissue  donation    Durable Power of  for Healthcare - this document names an -in-fact to make medical decisions for you, but it may also allow this person to make personal and financial decisions for you. Please seek the advice of an  if you need this type of document.    **Advance Directives are not required and no one may discriminate against you if you do not sign one.    Medical Orders  Many states allow specific forms/orders signed by your physician to record your wishes for medical treatment in your current state of health. This form, signed in personal communication with your physician, addresses resuscitation and other medical interventions that you may or may not want.      For more information or to schedule a time with a [facility] Advance Care Planning Facilitator contact: [facility] or call [phone] and someone will contact you directly.Calorie Counting for Weight Loss  Calories are units of energy. Your body needs a certain number of calories from food to keep going throughout the day. When you eat or drink more calories than your body needs, your body stores the extra calories mostly as fat. When you eat or drink fewer calories than your body needs, your body burns fat to get the energy it needs.  Calorie counting means keeping track of how many calories you eat and drink each day. Calorie counting can be helpful if you need to lose weight. If you eat fewer calories than your body needs, you should lose weight. Ask your health care provider what a healthy weight is for you.  For calorie counting to work, you will need to eat the right number of calories each day to lose a healthy amount of weight per week. A dietitian can help you figure out how many calories you need in a day and will suggest ways to reach your calorie goal.  A healthy amount of weight to lose each week is usually 1-2 lb (0.5-0.9 kg). This usually means that your daily calorie intake should be  reduced by 500-750 calories.  Eating 1,200-1,500 calories a day can help most women lose weight.  Eating 1,500-1,800 calories a day can help most men lose weight.  What do I need to know about calorie counting?  Work with your health care provider or dietitian to determine how many calories you should get each day. To meet your daily calorie goal, you will need to:  Find out how many calories are in each food that you would like to eat. Try to do this before you eat.  Decide how much of the food you plan to eat.  Keep a food log. Do this by writing down what you ate and how many calories it had.  To successfully lose weight, it is important to balance calorie counting with a healthy lifestyle that includes regular activity.  Where do I find calorie information?  The number of calories in a food can be found on a Nutrition Facts label. If a food does not have a Nutrition Facts label, try to look up the calories online or ask your dietitian for help.  Remember that calories are listed per serving. If you choose to have more than one serving of a food, you will have to multiply the calories per serving by the number of servings you plan to eat. For example, the label on a package of bread might say that a serving size is 1 slice and that there are 90 calories in a serving. If you eat 1 slice, you will have eaten 90 calories. If you eat 2 slices, you will have eaten 180 calories.  How do I keep a food log?  After each time that you eat, record the following in your food log as soon as possible:  What you ate. Be sure to include toppings, sauces, and other extras on the food.  How much you ate. This can be measured in cups, ounces, or number of items.  How many calories were in each food and drink.  The total number of calories in the food you ate.  Keep your food log near you, such as in a pocket-sized notebook or on an audelia or website on your mobile phone. Some programs will calculate calories for you and show you how  many calories you have left to meet your daily goal.  What are some portion-control tips?  Know how many calories are in a serving. This will help you know how many servings you can have of a certain food.  Use a measuring cup to measure serving sizes. You could also try weighing out portions on a kitchen scale. With time, you will be able to estimate serving sizes for some foods.  Take time to put servings of different foods on your favorite plates or in your favorite bowls and cups so you know what a serving looks like.  Try not to eat straight from a food's packaging, such as from a bag or box. Eating straight from the package makes it hard to see how much you are eating and can lead to overeating. Put the amount you would like to eat in a cup or on a plate to make sure you are eating the right portion.  Use smaller plates, glasses, and bowls for smaller portions and to prevent overeating.  Try not to multitask. For example, avoid watching TV or using your computer while eating. If it is time to eat, sit down at a table and enjoy your food. This will help you recognize when you are full. It will also help you be more mindful of what and how much you are eating.  What are tips for following this plan?  Reading food labels  Check the calorie count compared with the serving size. The serving size may be smaller than what you are used to eating.  Check the source of the calories. Try to choose foods that are high in protein, fiber, and vitamins, and low in saturated fat, trans fat, and sodium.  Shopping  Read nutrition labels while you shop. This will help you make healthy decisions about which foods to buy.  Pay attention to nutrition labels for low-fat or fat-free foods. These foods sometimes have the same number of calories or more calories than the full-fat versions. They also often have added sugar, starch, or salt to make up for flavor that was removed with the fat.  Make a grocery list of lower-calorie foods  and stick to it.  Cooking  Try to cook your favorite foods in a healthier way. For example, try baking instead of frying.  Use low-fat dairy products.  Meal planning  Use more fruits and vegetables. One-half of your plate should be fruits and vegetables.  Include lean proteins, such as chicken, turkey, and fish.  Lifestyle  Each week, aim to do one of the followin minutes of moderate exercise, such as walking.  75 minutes of vigorous exercise, such as running.  General information  Know how many calories are in the foods you eat most often. This will help you calculate calorie counts faster.  Find a way of tracking calories that works for you. Get creative. Try different apps or programs if writing down calories does not work for you.  What foods should I eat?    Eat nutritious foods. It is better to have a nutritious, high-calorie food, such as an avocado, than a food with few nutrients, such as a bag of potato chips.  Use your calories on foods and drinks that will fill you up and will not leave you hungry soon after eating.  Examples of foods that fill you up are nuts and nut butters, vegetables, lean proteins, and high-fiber foods such as whole grains. High-fiber foods are foods with more than 5 g of fiber per serving.  Pay attention to calories in drinks. Low-calorie drinks include water and unsweetened drinks.  The items listed above may not be a complete list of foods and beverages you can eat. Contact a dietitian for more information.  What foods should I limit?  Limit foods or drinks that are not good sources of vitamins, minerals, or protein or that are high in unhealthy fats. These include:  Candy.  Other sweets.  Sodas, specialty coffee drinks, alcohol, and juice.  The items listed above may not be a complete list of foods and beverages you should avoid. Contact a dietitian for more information.  How do I count calories when eating out?  Pay attention to portions. Often, portions are much larger  when eating out. Try these tips to keep portions smaller:  Consider sharing a meal instead of getting your own.  If you get your own meal, eat only half of it. Before you start eating, ask for a container and put half of your meal into it.  When available, consider ordering smaller portions from the menu instead of full portions.  Pay attention to your food and drink choices. Knowing the way food is cooked and what is included with the meal can help you eat fewer calories.  If calories are listed on the menu, choose the lower-calorie options.  Choose dishes that include vegetables, fruits, whole grains, low-fat dairy products, and lean proteins.  Choose items that are boiled, broiled, grilled, or steamed. Avoid items that are buttered, battered, fried, or served with cream sauce. Items labeled as crispy are usually fried, unless stated otherwise.  Choose water, low-fat milk, unsweetened iced tea, or other drinks without added sugar. If you want an alcoholic beverage, choose a lower-calorie option, such as a glass of wine or light beer.  Ask for dressings, sauces, and syrups on the side. These are usually high in calories, so you should limit the amount you eat.  If you want a salad, choose a garden salad and ask for grilled meats. Avoid extra toppings such as zacarias, cheese, or fried items. Ask for the dressing on the side, or ask for olive oil and vinegar or lemon to use as dressing.  Estimate how many servings of a food you are given. Knowing serving sizes will help you be aware of how much food you are eating at restaurants.  Where to find more information  Centers for Disease Control and Prevention: www.cdc.gov  [place] Department of Agriculture: myplate.gov  Summary  Calorie counting means keeping track of how many calories you eat and drink each day. If you eat fewer calories than your body needs, you should lose weight.  A healthy amount of weight to lose per week is usually 1-2 lb (0.5-0.9 kg). This usually  means reducing your daily calorie intake by 500-750 calories.  The number of calories in a food can be found on a Nutrition Facts label. If a food does not have a Nutrition Facts label, try to look up the calories online or ask your dietitian for help.  Use smaller plates, glasses, and bowls for smaller portions and to prevent overeating.  Use your calories on foods and drinks that will fill you up and not leave you hungry shortly after a meal.  This information is not intended to replace advice given to you by your health care provider. Make sure you discuss any questions you have with your health care provider.  Document Revised: 01/28/2021 Document Reviewed: 01/28/2021  [employer] Patient Education © 2022 [employer]    Exercising to Lose Weight  Getting regular exercise is important for everyone. It is especially important if you are overweight. Being overweight increases your risk of heart disease, stroke, diabetes, high blood pressure, and several types of cancer. Exercising, and reducing the calories you consume, can help you lose weight and improve fitness and health.  Exercise can be moderate or vigorous intensity. To lose weight, most people need to do a certain amount of moderate or vigorous-intensity exercise each week.  How can exercise affect me?  You lose weight when you exercise enough to burn more calories than you eat. Exercise also reduces body fat and builds muscle. The more muscle you have, the more calories you burn. Exercise also:  Improves mood.  Reduces stress and tension.  Improves your overall fitness, flexibility, and endurance.  Increases bone strength.  Moderate-intensity exercise  Moderate-intensity exercise is any activity that gets you moving enough to burn at least three times more energy (calories) than if you were sitting.  Examples of moderate exercise include:  Walking a mile in 15 minutes.  Doing light yard work.  Biking at an easy pace.  Most people should get at least 150  minutes of moderate-intensity exercise a week to maintain their body weight.  Vigorous-intensity exercise  Vigorous-intensity exercise is any activity that gets you moving enough to burn at least six times more calories than if you were sitting. When you exercise at this intensity, you should be working hard enough that you are not able to carry on a conversation.  Examples of vigorous exercise include:  Running.  Playing a team sport, such as football, basketball, and soccer.  Jumping rope.  Most people should get at least 75 minutes a week of vigorous exercise to maintain their body weight.  What actions can I take to lose weight?  The amount of exercise you need to lose weight depends on:  Your age.  The type of exercise.  Any health conditions you have.  Your overall physical ability.  Talk to your health care provider about how much exercise you need and what types of activities are safe for you.  Nutrition    Make changes to your diet as told by your health care provider or diet and nutrition specialist (dietitian). This may include:  Eating fewer calories.  Eating more protein.  Eating less unhealthy fats.  Eating a diet that includes fresh fruits and vegetables, whole grains, low-fat dairy products, and lean protein.  Avoiding foods with added fat, salt, and sugar.  Drink plenty of water while you exercise to prevent dehydration or heat stroke.  Activity  Choose an activity that you enjoy and set realistic goals. Your health care provider can help you make an exercise plan that works for you.  Exercise at a moderate or vigorous intensity most days of the week.  The intensity of exercise may vary from person to person. You can tell how intense a workout is for you by paying attention to your breathing and heartbeat. Most people will notice their breathing and heartbeat get faster with more intense exercise.  Do resistance training twice each week, such as:  Push-ups.  Sit-ups.  Lifting weights.  Using  resistance bands.  Getting short amounts of exercise can be just as helpful as long, structured periods of exercise. If you have trouble finding time to exercise, try doing these things as part of your daily routine:  Get up, stretch, and walk around every 30 minutes throughout the day.  Go for a walk during your lunch break.  Park your car farther away from your destination.  If you take public transportation, get off one stop early and walk the rest of the way.  Make phone calls while standing up and walking around.  Take the stairs instead of elevators or escalators.  Wear comfortable clothes and shoes with good support.  Do not exercise so much that you hurt yourself, feel dizzy, or get very short of breath.  Where to find more information  [place] Department of Health and Human Services: www.hhs.gov  Centers for Disease Control and Prevention: www.cdc.gov  Contact a health care provider:  Before starting a new exercise program.  If you have questions or concerns about your weight.  If you have a medical problem that keeps you from exercising.  Get help right away if:  You have any of the following while exercising:  Injury.  Dizziness.  Difficulty breathing or shortness of breath that does not go away when you stop exercising.  Chest pain.  Rapid heartbeat.  These symptoms may represent a serious problem that is an emergency. Do not wait to see if the symptoms will go away. Get medical help right away. Call your local emergency services (911 in the [place] Do not drive yourself to the hospital.  Summary  Getting regular exercise is especially important if you are overweight.  Being overweight increases your risk of heart disease, stroke, diabetes, high blood pressure, and several types of cancer.  Losing weight happens when you burn more calories than you eat.  Reducing the amount of calories you eat, and getting regular moderate or vigorous exercise each week, helps you lose weight.  This information is not  intended to replace advice given to you by your health care provider. Make sure you discuss any questions you have with your health care provider.  Document Revised: 02/13/2022 Document Reviewed: 02/13/2022  [employer] Patient Education © 2022 [employer]

## 2024-03-04 NOTE — PROGRESS NOTES
QUICK REFERENCE INFORMATION:  The ABCs of the Annual Wellness Visit    Subsequent Medicare Wellness Visit    HEALTH RISK ASSESSMENT    1960  Marizol Park is a 63 y.o. female who presents for an Subsequent Wellness Visit.    The following portions of the patient's history were reviewed and   updated as appropriate: allergies, current medications, past family history, past medical history, past social history, past surgical history, and problem list.    Compared to one year ago, the patient feels his physical   health is the same.    Compared to one year ago, the patient feels his mental   health is the same.    Recent Hospitalizations:  She was not admitted to the hospital during the last year.     Current Medical Providers:  Patient Care Team:  Ras Mcconnell DO as PCP - General (Family Medicine)  Victoriano Loredo MD as Consulting Physician (Ophthalmology)  Randal Demarco MD as Consulting Physician (Gastroenterology)    I reviewed list of current Medical providers and suppliers with patient and are listed above to the best of the patient's and my knowledge.    Smoking Status:  Social History     Tobacco Use   Smoking Status Never   Smokeless Tobacco Never       Alcohol Consumption:  Social History     Substance and Sexual Activity   Alcohol Use No       Depression Screen:       3/4/2024     1:00 PM   PHQ-2/PHQ-9 Depression Screening   Little Interest or Pleasure in Doing Things 0-->not at all   Feeling Down, Depressed or Hopeless 0-->not at all   PHQ-9: Brief Depression Severity Measure Score 0       Health Habits and Functional and Cognitive Screening:      3/4/2024     1:00 PM   Functional & Cognitive Status   Do you have difficulty preparing food and eating? No   Do you have difficulty bathing yourself, getting dressed or grooming yourself? No   Do you have difficulty using the toilet? No   Do you have difficulty moving around from place to place? No   Do you have trouble with steps or getting out  of a bed or a chair? No   Current Diet Well Balanced Diet   Dental Exam Up to date   Eye Exam Up to date   Exercise (times per week) 0 times per week   Current Exercises Include No Regular Exercise   Do you need help using the phone?  No   Are you deaf or do you have serious difficulty hearing?  No   Do you need help to go to places out of walking distance? No   Do you need help shopping? No   Do you need help preparing meals?  No   Do you need help with housework?  No   Do you need help with laundry? No   Do you need help taking your medications? No   Do you need help managing money? No   Do you ever drive or ride in a car without wearing a seat belt? No   Have you felt unusual stress, anger or loneliness in the last month? No   Who do you live with? Spouse   If you need help, do you have trouble finding someone available to you? No   Have you been bothered in the last four weeks by sexual problems? No   Do you have difficulty concentrating, remembering or making decisions? No       Does the patient have evidence of cognitive impairment? No    Aspirin use counseling: Does not need ASA (and currently is not on it)    Recent Lab Results:  CMP:  Lab Results   Component Value Date     (H) 01/23/2018    BUN 16 06/05/2023    CREATININE 0.62 06/05/2023    EGFRIFNONA 89 05/24/2021    EGFRIFAFRI 108 05/24/2021    BCR 26 06/05/2023     06/05/2023    K 4.8 06/05/2023    CO2 26 06/05/2023    CALCIUM 9.7 06/05/2023    PROTENTOTREF 7.2 06/05/2023    ALBUMIN 4.2 06/05/2023    LABGLOBREF 3.0 06/05/2023    LABIL2 1.4 06/05/2023    BILITOT <0.2 06/05/2023    ALKPHOS 98 06/05/2023    AST 28 06/05/2023    ALT 31 06/05/2023     Lipid Panel:  Lab Results   Component Value Date    TRIG 488 (H) 06/05/2023    HDL 43 06/05/2023    VLDL 100 (H) 06/05/2023     HbA1c:       Visual Acuity:  No results found.    Age-appropriate Screening Schedule:  Refer to the list below for future screening recommendations based on patient's age,  sex and/or medical conditions. Orders for these recommended tests are listed in the plan section. The patient has been provided with a written plan.    Health Maintenance   Topic Date Due    RSV Vaccine - Adults (1 - 1-dose 60+ series) Never done    LIPID PANEL  06/05/2024    BMI FOLLOWUP  06/05/2024    MAMMOGRAM  12/09/2024    ANNUAL WELLNESS VISIT  03/04/2025    TDAP/TD VACCINES (2 - Td or Tdap) 08/16/2026    COLORECTAL CANCER SCREENING  12/03/2031    COVID-19 Vaccine  Completed    INFLUENZA VACCINE  Completed    HEPATITIS C SCREENING  Addressed    Pneumococcal Vaccine 0-64  Aged Out    PAP SMEAR  Discontinued          Outpatient Medications Prior to Visit   Medication Sig Dispense Refill    adalimumab (HUMIRA) 40 MG/0.8ML Prefilled Syringe Kit injection Inject 0.8 mL under the skin into the appropriate area as directed Every 14 (Fourteen) Days. 2 each 5    calcium citrate-vitamin d (CITRACAL) 200-250 MG-UNIT tablet tablet Take  by mouth Daily.      fluticasone (FLONASE) 50 MCG/ACT nasal spray 2 sprays into the nostril(s) as directed by provider Daily. 16 g 5    lisinopril (PRINIVIL,ZESTRIL) 10 MG tablet TAKE 1 TABLET BY MOUTH DAILY 30 tablet 5    multivitamin with minerals tablet tablet Take 1 tablet by mouth Daily.      Nutritional Supplements (Ensure Original) liquid Take 1 can by mouth 2 (Two) Times a Day. 57165 mL 12    omeprazole (priLOSEC) 40 MG capsule TAKE 1 CAPSULE BY MOUTH TWICE DAILY 180 capsule 1    pravastatin (PRAVACHOL) 80 MG tablet Take 0.5 tablets by mouth Every Night. 45 tablet 1    Qbrexza 2.4 % pads APPLY 1 PAD TO THE AFFECTED AREA DAILY AS NEEDED FOR HYPERHIDROSIS 30 each 5    tiZANidine (ZANAFLEX) 4 MG tablet TAKE 1 TABLET BY MOUTH EVERY 8 HOURS AS NEEDED FOR MUSCLE SPASMS 90 tablet 5    traZODone (DESYREL) 50 MG tablet TAKE 1/2 TABLET BY MOUTH EVERY NIGHT AT BEDTIME 15 tablet 2    venlafaxine XR (EFFEXOR-XR) 150 MG 24 hr capsule TAKE 1 CAPSULE BY MOUTH DAILY 90 capsule 1    venlafaxine XR  (EFFEXOR-XR) 75 MG 24 hr capsule TAKE ONE CAPSULE BY MOUTH EVERY DAY ALONG WITH 150 MG 90 capsule 1    vitamin B-12 (CYANOCOBALAMIN) 100 MCG tablet Take 0.5 tablets by mouth Daily.       No facility-administered medications prior to visit.       Patient Active Problem List   Diagnosis    Acute post-traumatic stress disorder    Anxiety    Benign essential hypertension    Excessive cerumen in ear canal    Cough    Dyslipidemia    External hemorrhoids    Gastroesophageal reflux disease    Hemorrhoids    Panic disorder without agoraphobia    Peripheral neuropathy    Ulcerative colitis    Ulnar neuropathy    Immunocompromised state    High triglycerides    Right anterior shoulder pain    Hypertension    Ingrown nail    Pain in limb    Bacterial intestinal infection, unspecified    Chronic left-sided ulcerative colitis    Diarrhea    Diverticulitis    Diverticulosis of colon    Family history of colon cancer    Painful micturition, unspecified    Personal history of immunosuppression therapy    Rectal bleeding    High blood pressure    Ulcerative colitis, unspecified, without complications    Ulcerative colitis, unspecified with abscess       Advance Care Planning:  ACP discussion was held with the patient during this visit. Patient has an advance directive in EMR which is still valid.     Identification of Risk Factors:  Risk factors include: Obesity/Overweight .    Review of Systems   Respiratory:  Negative for shortness of breath.    Cardiovascular:  Negative for chest pain, palpitations, orthopnea and PND.   Musculoskeletal:  Negative for neck pain.   Psychiatric/Behavioral:  The patient is nervous/anxious.        Compared to one year ago, the patient feels her physical health is the same.  Compared to one year ago, the patient feels her mental health is the same.    Objective     Physical Exam  Vitals and nursing note reviewed.   Constitutional:       Appearance: She is well-developed.   HENT:      Head:  "Normocephalic and atraumatic.   Neck:      Thyroid: No thyromegaly.      Vascular: No JVD.   Cardiovascular:      Rate and Rhythm: Normal rate and regular rhythm.      Heart sounds: Normal heart sounds. No murmur heard.     No friction rub. No gallop.   Pulmonary:      Effort: Pulmonary effort is normal. No respiratory distress.      Breath sounds: Normal breath sounds. No wheezing or rales.   Abdominal:      General: Bowel sounds are normal. There is no distension.      Palpations: Abdomen is soft.      Tenderness: There is no abdominal tenderness. There is no guarding or rebound.   Musculoskeletal:      Cervical back: Neck supple.   Skin:     General: Skin is warm and dry.   Neurological:      Mental Status: She is alert.   Psychiatric:         Behavior: Behavior normal.         Vitals:    03/04/24 1329   BP: 100/68   Pulse: 80   SpO2: 94%   Weight: 67.6 kg (149 lb)   Height: 152.4 cm (60\")   PainSc: 0-No pain     Body mass index is 29.1 kg/m².           Assessment & Plan   Patient Self-Management and Personalized Health Advice  The patient has been provided with information about: diet and exercise and preventive services including:   Annual Wellness Visit (AWV).    Visit Diagnoses:    ICD-10-CM ICD-9-CM   1. Medicare annual wellness visit, subsequent  Z00.00 V70.0   2. Benign essential hypertension  I10 401.1   3. Acute idiopathic gout of multiple sites  M10.09 274.01   4. Dyslipidemia  E78.5 272.4   5. Vitamin D deficiency  E55.9 268.9   6. Overweight (BMI 25.0-29.9)  E66.3 278.02   7. Ulcerative rectosigmoiditis without complication  K51.30 556.3   8. AUREA (generalized anxiety disorder)  F41.1 300.02       Orders Placed This Encounter   Procedures    Comprehensive Metabolic Panel     Order Specific Question:   Release to patient     Answer:   Routine Release [[id number]]    Lipid Panel     Order Specific Question:   Release to patient     Answer:   Routine Release [[id number]]    Vitamin D,25-Hydroxy     Order " Specific Question:   Release to patient     Answer:   Routine Release [[id number]]    CBC (No Diff)     Order Specific Question:   Release to patient     Answer:   Routine Release [[id number]]    Uric Acid     Order Specific Question:   Release to patient     Answer:   Routine Release [[id number]]       Outpatient Encounter Medications as of 3/4/2024   Medication Sig Dispense Refill    adalimumab (HUMIRA) 40 MG/0.8ML Prefilled Syringe Kit injection Inject 0.8 mL under the skin into the appropriate area as directed Every 14 (Fourteen) Days. 2 each 5    calcium citrate-vitamin d (CITRACAL) 200-250 MG-UNIT tablet tablet Take  by mouth Daily.      fluticasone (FLONASE) 50 MCG/ACT nasal spray 2 sprays into the nostril(s) as directed by provider Daily. 16 g 5    lisinopril (PRINIVIL,ZESTRIL) 10 MG tablet TAKE 1 TABLET BY MOUTH DAILY 30 tablet 5    multivitamin with minerals tablet tablet Take 1 tablet by mouth Daily.      Nutritional Supplements (Ensure Original) liquid Take 1 can by mouth 2 (Two) Times a Day. 94695 mL 12    omeprazole (priLOSEC) 40 MG capsule TAKE 1 CAPSULE BY MOUTH TWICE DAILY 180 capsule 1    pravastatin (PRAVACHOL) 80 MG tablet Take 0.5 tablets by mouth Every Night. 45 tablet 1    Qbrexza 2.4 % pads APPLY 1 PAD TO THE AFFECTED AREA DAILY AS NEEDED FOR HYPERHIDROSIS 30 each 5    tiZANidine (ZANAFLEX) 4 MG tablet TAKE 1 TABLET BY MOUTH EVERY 8 HOURS AS NEEDED FOR MUSCLE SPASMS 90 tablet 5    traZODone (DESYREL) 50 MG tablet TAKE 1/2 TABLET BY MOUTH EVERY NIGHT AT BEDTIME 15 tablet 2    venlafaxine XR (EFFEXOR-XR) 150 MG 24 hr capsule TAKE 1 CAPSULE BY MOUTH DAILY 90 capsule 1    venlafaxine XR (EFFEXOR-XR) 75 MG 24 hr capsule TAKE ONE CAPSULE BY MOUTH EVERY DAY ALONG WITH 150 MG 90 capsule 1    vitamin B-12 (CYANOCOBALAMIN) 100 MCG tablet Take 0.5 tablets by mouth Daily.      [DISCONTINUED] venlafaxine XR (EFFEXOR-XR) 150 MG 24 hr capsule TAKE 1 CAPSULE BY MOUTH DAILY 90 capsule 1    [DISCONTINUED]  venlafaxine XR (EFFEXOR-XR) 75 MG 24 hr capsule TAKE ONE CAPSULE BY MOUTH DAILY ALONG WITH 150MG 90 capsule 1     No facility-administered encounter medications on file as of 3/4/2024.       Reviewed use of high risk medication in the elderly: yes  Reviewed for potential of harmful drug interactions in the elderly: yes  No opioid medication identified on active medication list. I have reviewed chart for other potential  high risk medication/s and harmful drug interactions in the elderly.    Social History     Socioeconomic History    Marital status:    Tobacco Use    Smoking status: Never    Smokeless tobacco: Never   Vaping Use    Vaping status: Never Used   Substance and Sexual Activity    Alcohol use: No    Drug use: No    Sexual activity: Never     Patient was screened for OUD and ELÍAS risk factors.  Marizol Park's pain level was assessed as well today.  I included a review current recommendations on pain management, best use practices, current CDC guidelines, alternatives to opioids including OTC & Rx topicals, non-opioid oral medications (eg nsaids, acetominophen) and life style interventions such as yoga, delroy chi, stretching, regular exercise, PT/OT and referral to specialty care like Pain Management that specialize in pain treatment when appropriate.   I also included a review of serious risks of opioid use and substance use disorder.  I have included all of this in the after visit summary along with other risk factors identified that are pertinent to the patient today.      Follow Up:  Return in about 6 months (around 9/4/2024), or if symptoms worsen or fail to improve.     An After Visit Summary and PPPS with all of these plans were given to the patient.           ++++++++++++++++++++++++++++++++++++++++++++++++++++++++++++++++++   Additional E&M Note during same encounter follows:  Patient has multiple medical problems which are significant and separately identifiable that require additional work  "above and beyond the Medicare Wellness Visit.   Chief Complaint   Patient presents with    Medicare Wellness-subsequent    Hypertension    Hyperlipidemia    Anxiety     Hypertension  This is a chronic problem. The current episode started more than 1 year ago. The problem is unchanged. The problem is controlled. Associated symptoms include anxiety. Pertinent negatives include no chest pain, neck pain, orthopnea, palpitations, peripheral edema, PND or shortness of breath. The current treatment provides moderate improvement.   Hyperlipidemia  This is a chronic problem. The problem is controlled. Recent lipid tests were reviewed and are normal. Pertinent negatives include no chest pain or shortness of breath. Current antihyperlipidemic treatment includes statins. The current treatment provides moderate improvement of lipids.   Anxiety  Presents for follow-up visit. Symptoms include excessive worry and nervous/anxious behavior. Patient reports no chest pain, palpitations or shortness of breath. Symptoms occur occasionally.     Compliance with medications is %.     No gout attacks, due labs    [facility], monitoring by Dr. Demarco    Review of Systems   Cardiovascular:  Negative for chest pain, orthopnea, palpitations and paroxysmal nocturnal dyspnea.   Respiratory:  Negative for shortness of breath.    Musculoskeletal:  Negative for neck pain.   Psychiatric/Behavioral:  The patient is nervous/anxious.        Social History     Tobacco Use    Smoking status: Never    Smokeless tobacco: Never   Substance Use Topics    Alcohol use: No     O:   Vitals:    03/04/24 1329   BP: 100/68   Pulse: 80   SpO2: 94%   Weight: 67.6 kg (149 lb)   Height: 152.4 cm (60\")   PainSc: 0-No pain     Body mass index is 29.1 kg/m².  Vitals and nursing note reviewed.   Constitutional:       Appearance: Well-developed.   HENT:      Head: Normocephalic and atraumatic.   Neck:      Thyroid: No thyromegaly.      Vascular: No JVD.   Pulmonary:      " Effort: Pulmonary effort is normal. No respiratory distress.      Breath sounds: Normal breath sounds. No wheezing. No rales.   Cardiovascular:      Normal rate. Regular rhythm. Normal heart sounds.      No gallop.  No friction rub.   Abdominal:      General: Bowel sounds are normal. There is no distension.      Palpations: Abdomen is soft.      Tenderness: There is no abdominal tenderness. There is no guarding or rebound.   Musculoskeletal:      Cervical back: Neck supple. Skin:     General: Skin is warm and dry.   Neurological:      Mental Status: Alert.   Psychiatric:         Behavior: Behavior normal.         Diagnoses and all orders for this visit:    1. Medicare annual wellness visit, subsequent (Primary)    2. Benign essential hypertension  -     Comprehensive Metabolic Panel    3. Acute idiopathic gout of multiple sites  -     Uric Acid    4. Dyslipidemia  -     Comprehensive Metabolic Panel  -     Lipid Panel    5. Vitamin D deficiency  -     Vitamin D,25-Hydroxy    6. Overweight (BMI 25.0-29.9)    7. Ulcerative rectosigmoiditis without complication  -     Comprehensive Metabolic Panel  -     CBC (No Diff)    8. AUREA (generalized anxiety disorder)    Aurea continue meds  -hypertension - controlled, continue medications  -HLD - continue statin, recheck lipids.  -due check urica bogdan and vitamin D    Return in about 6 months (around 9/4/2024), or if symptoms worsen or fail to improve.     Patient/Caregiver provided printed discharge information.

## 2024-03-05 LAB
25(OH)D3+25(OH)D2 SERPL-MCNC: 29.3 NG/ML (ref 30–100)
ALBUMIN SERPL-MCNC: 4.3 G/DL (ref 3.9–4.9)
ALBUMIN/GLOB SERPL: 1.2 {RATIO} (ref 1.2–2.2)
ALP SERPL-CCNC: 110 IU/L (ref 44–121)
ALT SERPL-CCNC: 37 IU/L (ref 0–32)
AST SERPL-CCNC: 29 IU/L (ref 0–40)
BILIRUB SERPL-MCNC: 0.2 MG/DL (ref 0–1.2)
BUN SERPL-MCNC: 19 MG/DL (ref 8–27)
BUN/CREAT SERPL: 25 (ref 12–28)
CALCIUM SERPL-MCNC: 9.9 MG/DL (ref 8.7–10.3)
CHLORIDE SERPL-SCNC: 101 MMOL/L (ref 96–106)
CHOLEST SERPL-MCNC: 348 MG/DL (ref 100–199)
CO2 SERPL-SCNC: 25 MMOL/L (ref 20–29)
CREAT SERPL-MCNC: 0.75 MG/DL (ref 0.57–1)
EGFRCR SERPLBLD CKD-EPI 2021: 89 ML/MIN/1.73
ERYTHROCYTE [DISTWIDTH] IN BLOOD BY AUTOMATED COUNT: 12.6 % (ref 11.7–15.4)
GLOBULIN SER CALC-MCNC: 3.6 G/DL (ref 1.5–4.5)
GLUCOSE SERPL-MCNC: 91 MG/DL (ref 70–99)
HCT VFR BLD AUTO: 40.2 % (ref 34–46.6)
HDLC SERPL-MCNC: 48 MG/DL
HGB BLD-MCNC: 13 G/DL (ref 11.1–15.9)
LABORATORY COMMENT REPORT: ABNORMAL
LDLC SERPL CALC-MCNC: 220 MG/DL (ref 0–99)
MCH RBC QN AUTO: 28 PG (ref 26.6–33)
MCHC RBC AUTO-ENTMCNC: 32.3 G/DL (ref 31.5–35.7)
MCV RBC AUTO: 87 FL (ref 79–97)
PLATELET # BLD AUTO: 352 X10E3/UL (ref 150–450)
POTASSIUM SERPL-SCNC: 4.7 MMOL/L (ref 3.5–5.2)
PROT SERPL-MCNC: 7.9 G/DL (ref 6–8.5)
RBC # BLD AUTO: 4.64 X10E6/UL (ref 3.77–5.28)
SODIUM SERPL-SCNC: 140 MMOL/L (ref 134–144)
TRIGL SERPL-MCNC: 378 MG/DL (ref 0–149)
URATE SERPL-MCNC: 4.5 MG/DL (ref 3–7.2)
VLDLC SERPL CALC-MCNC: 80 MG/DL (ref 5–40)
WBC # BLD AUTO: 8.3 X10E3/UL (ref 3.4–10.8)

## 2024-03-10 NOTE — PROGRESS NOTES
Call results to patient.  Cholesterol is incredibly high, still taking pravastatin?  I know intolerant to rosuvastatin and atorvastatin, I would add zetia 10mg 1 daily to take with the pravastatin.  Vitamin D is low - I would take vitamin D3 5000 IU (125mg) daily  Blood counts normal  Kidney and liver function normal

## 2024-03-11 RX ORDER — EZETIMIBE 10 MG/1
10 TABLET ORAL DAILY
Qty: 90 TABLET | Refills: 1 | Status: SHIPPED | OUTPATIENT
Start: 2024-03-11

## 2024-04-26 ENCOUNTER — OFFICE (OUTPATIENT)
Dept: URBAN - METROPOLITAN AREA CLINIC 65 | Facility: CLINIC | Age: 64
End: 2024-04-26
Payer: MEDICARE

## 2024-04-26 VITALS
HEIGHT: 60 IN | WEIGHT: 150 LBS | HEART RATE: 93 BPM | DIASTOLIC BLOOD PRESSURE: 70 MMHG | SYSTOLIC BLOOD PRESSURE: 122 MMHG

## 2024-04-26 DIAGNOSIS — Z92.25 PERSONAL HISTORY OF IMMUNOSUPPRESSION THERAPY: ICD-10-CM

## 2024-04-26 DIAGNOSIS — K21.9 GASTRO-ESOPHAGEAL REFLUX DISEASE WITHOUT ESOPHAGITIS: ICD-10-CM

## 2024-04-26 DIAGNOSIS — K51.50 LEFT SIDED COLITIS WITHOUT COMPLICATIONS: ICD-10-CM

## 2024-04-26 PROCEDURE — 99214 OFFICE O/P EST MOD 30 MIN: CPT | Performed by: INTERNAL MEDICINE

## 2024-04-26 RX ORDER — ADALIMUMAB 40MG/0.4ML
KIT SUBCUTANEOUS
Qty: 3 | Refills: 3 | Status: ACTIVE
Start: 2021-01-26

## 2024-05-23 DIAGNOSIS — K21.9 GASTROESOPHAGEAL REFLUX DISEASE, UNSPECIFIED WHETHER ESOPHAGITIS PRESENT: ICD-10-CM

## 2024-05-23 RX ORDER — OMEPRAZOLE 40 MG/1
CAPSULE, DELAYED RELEASE ORAL
Qty: 180 CAPSULE | Refills: 1 | Status: SHIPPED | OUTPATIENT
Start: 2024-05-23

## 2024-05-24 ENCOUNTER — TELEPHONE (OUTPATIENT)
Dept: FAMILY MEDICINE CLINIC | Facility: CLINIC | Age: 64
End: 2024-05-24
Payer: MEDICARE

## 2024-05-24 DIAGNOSIS — L98.9 SKIN LESIONS: Primary | ICD-10-CM

## 2024-05-24 NOTE — TELEPHONE ENCOUNTER
Caller: Marizol Park    Relationship: Self    Best call back number: [phone]    What is the medical concern/diagnosis: SPOT SON BACK THAT LOOKED LIKE SPOT ON ARM THAT WAS CANCER. THERE ARE THREE SPOTS ON HER BACK OF LEFT SHOULDER    What specialty or service is being requested: DERMATOLOGIST     What is the provider, practice or medical service name: WHEREVER DR. ZAMORA RECOMMENDS, SOMEWHERE THAT'S FAIRLY CLOSE TO HOME    Any additional details: PLEASE ADVISE.

## 2024-08-26 RX ORDER — LISINOPRIL 10 MG/1
10 TABLET ORAL DAILY
Qty: 30 TABLET | Refills: 2 | Status: SHIPPED | OUTPATIENT
Start: 2024-08-26

## 2024-08-26 NOTE — TELEPHONE ENCOUNTER
Caller: Marizol Park    Relationship: Self    Best call back number: [phone]     Requested Prescriptions:   Requested Prescriptions     Pending Prescriptions Disp Refills    lisinopril (PRINIVIL,ZESTRIL) 10 MG tablet 30 tablet 5     Sig: Take 1 tablet by mouth Daily.        Pharmacy where request should be sent: [id number] [address] AT [facility] - [phone] [facility] [phone] [facility]     Last office visit with prescribing clinician: 3/4/2024   Last telemedicine visit with prescribing clinician: Visit date not found   Next office visit with prescribing clinician: 9/5/2024     Additional details provided by patient: OUT    Does the patient have less than a 3 day supply:  [x] Yes  [] No    Would you like a call back once the refill request has been completed: [x] Yes [] No    If the office needs to give you a call back, can they leave a voicemail: [] Yes [] No    Claudio Montesinos   08/26/24 14:42 EDT            
Yes

## 2024-09-05 ENCOUNTER — OFFICE VISIT (OUTPATIENT)
Dept: FAMILY MEDICINE CLINIC | Facility: CLINIC | Age: 64
End: 2024-09-05
Payer: MEDICARE

## 2024-09-05 VITALS
WEIGHT: 150 LBS | OXYGEN SATURATION: 98 % | HEIGHT: 60 IN | DIASTOLIC BLOOD PRESSURE: 82 MMHG | SYSTOLIC BLOOD PRESSURE: 134 MMHG | HEART RATE: 78 BPM | BODY MASS INDEX: 29.45 KG/M2

## 2024-09-05 DIAGNOSIS — E78.5 DYSLIPIDEMIA: ICD-10-CM

## 2024-09-05 DIAGNOSIS — K51.30 ULCERATIVE RECTOSIGMOIDITIS WITHOUT COMPLICATION: ICD-10-CM

## 2024-09-05 DIAGNOSIS — E66.3 OVERWEIGHT (BMI 25.0-29.9): ICD-10-CM

## 2024-09-05 DIAGNOSIS — E55.9 VITAMIN D DEFICIENCY: ICD-10-CM

## 2024-09-05 DIAGNOSIS — H61.23 CERUMEN DEBRIS ON TYMPANIC MEMBRANE OF BOTH EARS: ICD-10-CM

## 2024-09-05 DIAGNOSIS — F41.1 GAD (GENERALIZED ANXIETY DISORDER): ICD-10-CM

## 2024-09-05 DIAGNOSIS — I10 BENIGN ESSENTIAL HYPERTENSION: Primary | ICD-10-CM

## 2024-09-05 PROBLEM — L84 CORN OR CALLUS: Status: ACTIVE | Noted: 2024-09-05

## 2024-09-05 PROBLEM — L03.031 PARONYCHIA OF GREAT TOE OF RIGHT FOOT: Status: ACTIVE | Noted: 2024-09-05

## 2024-09-05 PROCEDURE — 3075F SYST BP GE 130 - 139MM HG: CPT | Performed by: FAMILY MEDICINE

## 2024-09-05 PROCEDURE — 3079F DIAST BP 80-89 MM HG: CPT | Performed by: FAMILY MEDICINE

## 2024-09-05 PROCEDURE — 99214 OFFICE O/P EST MOD 30 MIN: CPT | Performed by: FAMILY MEDICINE

## 2024-09-05 PROCEDURE — 1126F AMNT PAIN NOTED NONE PRSNT: CPT | Performed by: FAMILY MEDICINE

## 2024-09-05 NOTE — PROGRESS NOTES
Marek Park is a 63 y.o. female. Presents today for   Chief Complaint   Patient presents with    Hypertension    Hyperlipidemia    Anxiety       History of Present Illness  Patient 64 y/o female with htn, hld on meds, vitamin D def, due check and Has UC, has been doing well but more juancarlos nand blood when wipes, having endoscopy Sept 20;   AUREA stable on meds;   ears full at night  Review of Systems   Respiratory:  Negative for shortness of breath.    Cardiovascular:  Negative for chest pain and palpitations.       Patient Active Problem List   Diagnosis    Acute post-traumatic stress disorder    Anxiety    Benign essential hypertension    Excessive cerumen in ear canal    Cough    Dyslipidemia    External hemorrhoids    Gastroesophageal reflux disease    Hemorrhoids    Panic disorder without agoraphobia    Peripheral neuropathy    Porokeratosis    Ulcerative colitis    Ulnar neuropathy    Immunocompromised state    High triglycerides    Right anterior shoulder pain    Hypertension    Ingrown toenail    Pain in limb    Bacterial intestinal infection, unspecified    Chronic left-sided ulcerative colitis    Diarrhea    Diverticulitis    Diverticulosis of colon    Family history of colon cancer    Painful micturition, unspecified    Personal history of immunosuppression therapy    Rectal bleeding    High blood pressure    Ulcerative colitis, unspecified, without complications    Ulcerative colitis, unspecified with abscess    Corn or callus    Paronychia of great toe of right foot       Social History     Socioeconomic History    Marital status:    Tobacco Use    Smoking status: Never    Smokeless tobacco: Never   Vaping Use    Vaping status: Never Used   Substance and Sexual Activity    Alcohol use: No    Drug use: No    Sexual activity: Never       Allergies   Allergen Reactions    Remicade [Infliximab] Hives and Swelling     Facial swelling, hives wide spread    Sulfa Antibiotics Hives       Current  "Outpatient Medications on File Prior to Visit   Medication Sig Dispense Refill    adalimumab (HUMIRA) 40 MG/0.8ML Prefilled Syringe Kit injection Inject 0.8 mL under the skin into the appropriate area as directed Every 14 (Fourteen) Days. 2 each 5    calcium citrate-vitamin d (CITRACAL) 200-250 MG-UNIT tablet tablet Take  by mouth Daily.      fluticasone (FLONASE) 50 MCG/ACT nasal spray 2 sprays into the nostril(s) as directed by provider Daily. 16 g 5    lisinopril (PRINIVIL,ZESTRIL) 10 MG tablet Take 1 tablet by mouth Daily. 30 tablet 2    multivitamin with minerals tablet tablet Take 1 tablet by mouth Daily.      Nutritional Supplements (Ensure Original) liquid Take 1 can by mouth 2 (Two) Times a Day. 21096 mL 12    omeprazole (priLOSEC) 40 MG capsule TAKE 1 CAPSULE BY MOUTH TWICE DAILY 180 capsule 1    pravastatin (PRAVACHOL) 80 MG tablet Take 0.5 tablets by mouth Every Night. 45 tablet 1    Qbrexza 2.4 % pads APPLY 1 PAD TO THE AFFECTED AREA DAILY AS NEEDED FOR HYPERHIDROSIS 30 each 5    tiZANidine (ZANAFLEX) 4 MG tablet TAKE 1 TABLET BY MOUTH EVERY 8 HOURS AS NEEDED FOR MUSCLE SPASMS 90 tablet 5    traZODone (DESYREL) 50 MG tablet TAKE 1/2 TABLET BY MOUTH EVERY NIGHT AT BEDTIME 15 tablet 2    venlafaxine XR (EFFEXOR-XR) 150 MG 24 hr capsule TAKE 1 CAPSULE BY MOUTH DAILY 90 capsule 1    venlafaxine XR (EFFEXOR-XR) 75 MG 24 hr capsule TAKE ONE CAPSULE BY MOUTH EVERY DAY ALONG WITH 150 MG 90 capsule 1    vitamin B-12 (CYANOCOBALAMIN) 100 MCG tablet Take 0.5 tablets by mouth Daily.      ezetimibe (Zetia) 10 MG tablet Take 1 tablet by mouth Daily. (Patient not taking: Reported on 9/5/2024) 90 tablet 1     No current facility-administered medications on file prior to visit.       Objective   Vitals:    09/05/24 0948   BP: 134/82   Pulse: 78   SpO2: 98%   Weight: 68 kg (150 lb)   Height: 152.4 cm (60\")     Body mass index is 29.29 kg/m².    Physical Exam  Vitals and nursing note reviewed.   Constitutional:       " Appearance: She is well-developed.   HENT:      Head: Normocephalic and atraumatic.   Neck:      Thyroid: No thyromegaly.      Vascular: No JVD.   Cardiovascular:      Rate and Rhythm: Normal rate and regular rhythm.      Heart sounds: Normal heart sounds. No murmur heard.     No friction rub. No gallop.   Pulmonary:      Effort: Pulmonary effort is normal. No respiratory distress.      Breath sounds: Normal breath sounds. No wheezing or rales.   Abdominal:      General: Bowel sounds are normal. There is no distension.      Palpations: Abdomen is soft.      Tenderness: There is no abdominal tenderness. There is no guarding or rebound.   Musculoskeletal:      Cervical back: Neck supple.   Skin:     General: Skin is warm and dry.   Neurological:      Mental Status: She is alert.      Gait: Gait normal.   Psychiatric:         Behavior: Behavior normal.         Assessment & Plan   Diagnoses and all orders for this visit:    1. Benign essential hypertension (Primary)    2. Dyslipidemia  -     Comprehensive Metabolic Panel  -     Lipid Panel    3. Ulcerative rectosigmoiditis without complication  -     CBC (No Diff)  -     Comprehensive Metabolic Panel    4. AUREA (generalized anxiety disorder)    5. Vitamin D deficiency  -     Vitamin D,25-Hydroxy    6. Overweight (BMI 25.0-29.9)    7. Cerumen debris on tympanic membrane of both ears  -     carbamide peroxide (Debrox) 6.5 % otic solution; Administer 5 drops into both ears 2 (Two) Times a Day.  Dispense: 22.5 mL; Refill: 2    Try otic for cerumen, not plugged, just small amount  Aurea stable  UC - see gastro  -hypertension - controlled, continue medications  -HLD - continue statin, recheck lipids.  -due check vitamin D           BMI is >= 25 and <30. (Overweight) The following options were offered after discussion;: weight loss educational material (shared in after visit summary)     -Follow up: 6 months and prn

## 2024-09-06 LAB
25(OH)D3+25(OH)D2 SERPL-MCNC: 36 NG/ML (ref 30–100)
ALBUMIN SERPL-MCNC: 4.4 G/DL (ref 3.5–5.2)
ALBUMIN/GLOB SERPL: 1.4 G/DL
ALP SERPL-CCNC: 112 U/L (ref 39–117)
ALT SERPL-CCNC: 39 U/L (ref 1–33)
AST SERPL-CCNC: 38 U/L (ref 1–32)
BILIRUB SERPL-MCNC: 0.2 MG/DL (ref 0–1.2)
BUN SERPL-MCNC: 14 MG/DL (ref 8–23)
BUN/CREAT SERPL: 20.6 (ref 7–25)
CALCIUM SERPL-MCNC: 9.4 MG/DL (ref 8.6–10.5)
CHLORIDE SERPL-SCNC: 102 MMOL/L (ref 98–107)
CHOLEST SERPL-MCNC: 337 MG/DL (ref 0–200)
CO2 SERPL-SCNC: 23.7 MMOL/L (ref 22–29)
CREAT SERPL-MCNC: 0.68 MG/DL (ref 0.57–1)
EGFRCR SERPLBLD CKD-EPI 2021: 97.4 ML/MIN/1.73
ERYTHROCYTE [DISTWIDTH] IN BLOOD BY AUTOMATED COUNT: 13 % (ref 12.3–15.4)
GLOBULIN SER CALC-MCNC: 3.2 GM/DL
GLUCOSE SERPL-MCNC: 84 MG/DL (ref 65–99)
HCT VFR BLD AUTO: 40.4 % (ref 34–46.6)
HDLC SERPL-MCNC: 46 MG/DL (ref 40–60)
HGB BLD-MCNC: 13.1 G/DL (ref 12–15.9)
LDLC SERPL CALC-MCNC: 195 MG/DL (ref 0–100)
MCH RBC QN AUTO: 28.4 PG (ref 26.6–33)
MCHC RBC AUTO-ENTMCNC: 32.4 G/DL (ref 31.5–35.7)
MCV RBC AUTO: 87.6 FL (ref 79–97)
PLATELET # BLD AUTO: 306 10*3/MM3 (ref 140–450)
POTASSIUM SERPL-SCNC: 4.4 MMOL/L (ref 3.5–5.2)
PROT SERPL-MCNC: 7.6 G/DL (ref 6–8.5)
RBC # BLD AUTO: 4.61 10*6/MM3 (ref 3.77–5.28)
SODIUM SERPL-SCNC: 139 MMOL/L (ref 136–145)
TRIGL SERPL-MCNC: 464 MG/DL (ref 0–150)
VLDLC SERPL CALC-MCNC: 96 MG/DL (ref 5–40)
WBC # BLD AUTO: 8.19 10*3/MM3 (ref 3.4–10.8)

## 2024-09-08 NOTE — PROGRESS NOTES
Call results to patient.  Cholesterol and triglycerides are very high, has slight improvement of cholesterol  I would suggest livalo 4mg 1 po daily if not tried this one.  Vitamin D normal  Blood counts normal

## 2024-09-09 RX ORDER — PITAVASTATIN CALCIUM 4.18 MG/1
4 TABLET, FILM COATED ORAL NIGHTLY
Qty: 90 TABLET | Refills: 1 | Status: SHIPPED | OUTPATIENT
Start: 2024-09-09 | End: 2024-09-10 | Stop reason: SDUPTHER

## 2024-09-10 ENCOUNTER — TELEPHONE (OUTPATIENT)
Dept: FAMILY MEDICINE CLINIC | Facility: CLINIC | Age: 64
End: 2024-09-10
Payer: MEDICARE

## 2024-09-10 DIAGNOSIS — E78.5 DYSLIPIDEMIA: Primary | ICD-10-CM

## 2024-09-10 RX ORDER — PITAVASTATIN CALCIUM 4.18 MG/1
4 TABLET, FILM COATED ORAL NIGHTLY
Qty: 90 TABLET | Refills: 1 | Status: SHIPPED | OUTPATIENT
Start: 2024-09-10

## 2024-09-10 RX ORDER — PRAVASTATIN SODIUM 80 MG/1
40 TABLET ORAL NIGHTLY
Qty: 45 TABLET | Refills: 1 | Status: SHIPPED | OUTPATIENT
Start: 2024-09-10

## 2024-09-10 RX ORDER — EZETIMIBE 10 MG/1
10 TABLET ORAL DAILY
Qty: 90 TABLET | Refills: 1 | Status: SHIPPED | OUTPATIENT
Start: 2024-09-10

## 2024-09-10 NOTE — TELEPHONE ENCOUNTER
I have tried sending med to new pharmacy and it won't allow me to send. Please see if you can send.

## 2024-09-10 NOTE — TELEPHONE ENCOUNTER
Caller: Marizol Park ANGELES    Relationship: Self    Best call back number: [phone]*    Requested Prescriptions:   Requested Prescriptions     Pending Prescriptions Disp Refills    ezetimibe (Zetia) 10 MG tablet 90 tablet 1     Sig: Take 1 tablet by mouth Daily.    pravastatin (PRAVACHOL) 80 MG tablet 45 tablet 1     Sig: Take 0.5 tablets by mouth Every Night.        Pharmacy where request should be sent: [id number] [address] AT [facility] [phone] [facility] [phone]      Last office visit with prescribing clinician: 9/5/2024   Last telemedicine visit with prescribing clinician: Visit date not found   Next office visit with prescribing clinician: 9/10/2024     Additional details provided by patient: PATIENT OUT OF THE PRAVASTATIN.    Does the patient have less than a 3 day supply:  [x] Yes  [] No    Would you like a call back once the refill request has been completed: [] Yes [x] No    If the office needs to give you a call back, can they leave a voicemail: [] Yes [x] No    Kimber Peace   09/10/24 13:14 EDT

## 2024-09-10 NOTE — TELEPHONE ENCOUNTER
Caller: Marizol Park    Relationship: Self    Best call back number: [phone]*    What medication are you requesting: Pitavastatin Calcium 4 MG tablet     If a prescription is needed, what is your preferred pharmacy and phone number: [id number] [facility] [address] AT [facility] [address] - [phone]  - [phone] FX     Additional notes: PATIENT CALLING STATING THAT [facility], DOES NOT HAVE THIS MEDICATION IN STOCK AND HAS ORDERED IT, BUT UNKNOWN WHEN IT WILL COME IN. THE PATIENT IS REQUESTING A NEW PRESCRIPTION FOR THIS MEDICATION TO BE SENT TO [facility] [address]

## 2024-09-15 NOTE — TELEPHONE ENCOUNTER
Caller: Marizol Park    Relationship: Self    Best call back number:  [phone]     Medication needed:   busPIRone (BUSPAR) 7.5 MG tablet . PATIENT TAKES IT ONCE A DAY     When do you need the refill by: ASAP   What additional details did the patient provide when requesting the medication: PATIENT IS OUT OF MEDICATION    Does the patient have less than a 3 day supply:  [x] Yes  [] No    What is the patient's preferred pharmacy: [facility] #[id number] - [facility] [address] [facility] [phone] [facility] [phone]            
5/4/21  
Caller: Marizol Park A    Relationship to patient: Self    Best call back number: [phone]    Patient is needing: PATIENT CALLED STATING THAT SHE HAS BEEN WAKING UP WITH RED EYES WITH DISCHARGE. PATIENT STATES THAT AFTER WASHING OUT HER EYES, THEY ARE NO LONGER RED THROUGHOUT THE DAY BUT STILL HAVE DISCHARGE. PATIENT IS REQUESTING A CALL BACK FROM CLINICAL STAFF TO DISCUSS TREATMENT OPTIONS.       
No

## 2024-09-18 RX ORDER — OMEPRAZOLE 40 MG/1
40 CAPSULE, DELAYED RELEASE ORAL DAILY
COMMUNITY

## 2024-09-20 ENCOUNTER — ON CAMPUS - OUTPATIENT (OUTPATIENT)
Age: 64
End: 2024-09-20

## 2024-09-20 ENCOUNTER — ANESTHESIA (OUTPATIENT)
Dept: GASTROENTEROLOGY | Facility: HOSPITAL | Age: 64
End: 2024-09-20
Payer: MEDICARE

## 2024-09-20 ENCOUNTER — ON CAMPUS - OUTPATIENT (OUTPATIENT)
Dept: URBAN - METROPOLITAN AREA HOSPITAL 114 | Facility: HOSPITAL | Age: 64
End: 2024-09-20

## 2024-09-20 ENCOUNTER — ANESTHESIA EVENT (OUTPATIENT)
Dept: GASTROENTEROLOGY | Facility: HOSPITAL | Age: 64
End: 2024-09-20
Payer: MEDICARE

## 2024-09-20 ENCOUNTER — HOSPITAL ENCOUNTER (OUTPATIENT)
Facility: HOSPITAL | Age: 64
Setting detail: HOSPITAL OUTPATIENT SURGERY
Discharge: HOME | End: 2024-09-20
Attending: INTERNAL MEDICINE | Admitting: INTERNAL MEDICINE
Payer: MEDICARE

## 2024-09-20 VITALS
HEIGHT: 60 IN | WEIGHT: 147.2 LBS | DIASTOLIC BLOOD PRESSURE: 56 MMHG | HEART RATE: 93 BPM | RESPIRATION RATE: 16 BRPM | SYSTOLIC BLOOD PRESSURE: 119 MMHG | BODY MASS INDEX: 28.9 KG/M2 | OXYGEN SATURATION: 97 %

## 2024-09-20 DIAGNOSIS — K57.30 DIVERTICULOSIS OF LARGE INTESTINE WITHOUT PERFORATION OR ABS: ICD-10-CM

## 2024-09-20 DIAGNOSIS — K29.50 UNSPECIFIED CHRONIC GASTRITIS WITHOUT BLEEDING: ICD-10-CM

## 2024-09-20 DIAGNOSIS — K44.9 DIAPHRAGMATIC HERNIA WITHOUT OBSTRUCTION OR GANGRENE: ICD-10-CM

## 2024-09-20 DIAGNOSIS — K62.89 OTHER SPECIFIED DISEASES OF ANUS AND RECTUM: ICD-10-CM

## 2024-09-20 DIAGNOSIS — K21.9 GASTRO-ESOPHAGEAL REFLUX DISEASE WITHOUT ESOPHAGITIS: ICD-10-CM

## 2024-09-20 DIAGNOSIS — Z87.19 HX OF ULCERATIVE COLITIS: ICD-10-CM

## 2024-09-20 DIAGNOSIS — K52.89 OTHER SPECIFIED NONINFECTIVE GASTROENTERITIS AND COLITIS: ICD-10-CM

## 2024-09-20 DIAGNOSIS — K51.50 LEFT SIDED COLITIS WITHOUT COMPLICATIONS: ICD-10-CM

## 2024-09-20 PROCEDURE — 43235 EGD DIAGNOSTIC BRUSH WASH: CPT | Mod: 52 | Performed by: INTERNAL MEDICINE

## 2024-09-20 PROCEDURE — 25010000002 PROPOFOL 10 MG/ML EMULSION: Performed by: NURSE ANESTHETIST, CERTIFIED REGISTERED

## 2024-09-20 PROCEDURE — 45380 COLONOSCOPY AND BIOPSY: CPT | Performed by: INTERNAL MEDICINE

## 2024-09-20 PROCEDURE — 25810000003 SODIUM CHLORIDE 0.9 % SOLUTION: Performed by: INTERNAL MEDICINE

## 2024-09-20 PROCEDURE — 88305 TISSUE EXAM BY PATHOLOGIST: CPT | Performed by: INTERNAL MEDICINE

## 2024-09-20 PROCEDURE — 25810000003 LACTATED RINGERS PER 1000 ML: Performed by: NURSE ANESTHETIST, CERTIFIED REGISTERED

## 2024-09-20 PROCEDURE — 25010000002 GLYCOPYRROLATE 0.2 MG/ML SOLUTION: Performed by: NURSE ANESTHETIST, CERTIFIED REGISTERED

## 2024-09-20 RX ORDER — ONDANSETRON 2 MG/ML
4 INJECTION INTRAMUSCULAR; INTRAVENOUS ONCE AS NEEDED
Status: DISCONTINUED | OUTPATIENT
Start: 2024-09-20 | End: 2024-09-20 | Stop reason: HOSPADM

## 2024-09-20 RX ORDER — GLYCOPYRROLATE 0.2 MG/ML
INJECTION INTRAMUSCULAR; INTRAVENOUS AS NEEDED
Status: DISCONTINUED | OUTPATIENT
Start: 2024-09-20 | End: 2024-09-20 | Stop reason: SURG

## 2024-09-20 RX ORDER — LIDOCAINE HYDROCHLORIDE 20 MG/ML
INJECTION, SOLUTION INFILTRATION; PERINEURAL AS NEEDED
Status: DISCONTINUED | OUTPATIENT
Start: 2024-09-20 | End: 2024-09-20 | Stop reason: SURG

## 2024-09-20 RX ORDER — PROMETHAZINE HYDROCHLORIDE 25 MG/1
25 SUPPOSITORY RECTAL ONCE AS NEEDED
Status: DISCONTINUED | OUTPATIENT
Start: 2024-09-20 | End: 2024-09-20 | Stop reason: HOSPADM

## 2024-09-20 RX ORDER — PROPOFOL 10 MG/ML
VIAL (ML) INTRAVENOUS AS NEEDED
Status: DISCONTINUED | OUTPATIENT
Start: 2024-09-20 | End: 2024-09-20 | Stop reason: SURG

## 2024-09-20 RX ORDER — SODIUM CHLORIDE, SODIUM LACTATE, POTASSIUM CHLORIDE, CALCIUM CHLORIDE 600; 310; 30; 20 MG/100ML; MG/100ML; MG/100ML; MG/100ML
INJECTION, SOLUTION INTRAVENOUS CONTINUOUS PRN
Status: DISCONTINUED | OUTPATIENT
Start: 2024-09-20 | End: 2024-09-20 | Stop reason: SURG

## 2024-09-20 RX ORDER — SODIUM CHLORIDE 9 MG/ML
1000 INJECTION, SOLUTION INTRAVENOUS CONTINUOUS
Status: DISCONTINUED | OUTPATIENT
Start: 2024-09-20 | End: 2024-09-20 | Stop reason: HOSPADM

## 2024-09-20 RX ORDER — PROMETHAZINE HYDROCHLORIDE 25 MG/1
25 TABLET ORAL ONCE AS NEEDED
Status: DISCONTINUED | OUTPATIENT
Start: 2024-09-20 | End: 2024-09-20 | Stop reason: HOSPADM

## 2024-09-20 RX ORDER — SODIUM CHLORIDE 0.9 % (FLUSH) 0.9 %
10 SYRINGE (ML) INJECTION AS NEEDED
Status: DISCONTINUED | OUTPATIENT
Start: 2024-09-20 | End: 2024-09-20 | Stop reason: HOSPADM

## 2024-09-20 RX ADMIN — PROPOFOL 100 MG: 10 INJECTION, EMULSION INTRAVENOUS at 10:51

## 2024-09-20 RX ADMIN — GLYCOPYRROLATE 0.2 MG: 0.2 INJECTION INTRAMUSCULAR; INTRAVENOUS at 10:48

## 2024-09-20 RX ADMIN — LIDOCAINE HYDROCHLORIDE 100 MG: 20 INJECTION, SOLUTION INFILTRATION; PERINEURAL at 10:51

## 2024-09-20 RX ADMIN — PROPOFOL 60 MG: 10 INJECTION, EMULSION INTRAVENOUS at 11:08

## 2024-09-20 RX ADMIN — SODIUM CHLORIDE, POTASSIUM CHLORIDE, SODIUM LACTATE AND CALCIUM CHLORIDE: 600; 310; 30; 20 INJECTION, SOLUTION INTRAVENOUS at 10:47

## 2024-09-20 RX ADMIN — SODIUM CHLORIDE 1000 ML: 9 INJECTION, SOLUTION INTRAVENOUS at 10:44

## 2024-09-20 RX ADMIN — PROPOFOL 250 MCG/KG/MIN: 10 INJECTION, EMULSION INTRAVENOUS at 10:51

## 2024-09-21 DIAGNOSIS — F41.9 ANXIETY: ICD-10-CM

## 2024-09-23 LAB
CYTO UR: NORMAL
LAB AP CASE REPORT: NORMAL
PATH REPORT.FINAL DX SPEC: NORMAL
PATH REPORT.GROSS SPEC: NORMAL

## 2024-09-23 RX ORDER — VENLAFAXINE HYDROCHLORIDE 75 MG/1
CAPSULE, EXTENDED RELEASE ORAL
Qty: 90 CAPSULE | Refills: 1 | Status: SHIPPED | OUTPATIENT
Start: 2024-09-23

## 2024-09-27 RX ORDER — PRAVASTATIN SODIUM 80 MG/1
TABLET ORAL
Qty: 90 TABLET | Refills: 1 | Status: SHIPPED | OUTPATIENT
Start: 2024-09-27

## 2024-09-27 RX ORDER — EZETIMIBE 10 MG/1
10 TABLET ORAL DAILY
Qty: 90 TABLET | Refills: 1 | Status: SHIPPED | OUTPATIENT
Start: 2024-09-27

## 2024-10-02 DIAGNOSIS — F41.0 PANIC DISORDER WITHOUT AGORAPHOBIA: ICD-10-CM

## 2024-10-02 DIAGNOSIS — F41.9 ANXIETY: ICD-10-CM

## 2024-10-02 RX ORDER — VENLAFAXINE HYDROCHLORIDE 150 MG/1
150 CAPSULE, EXTENDED RELEASE ORAL DAILY
Qty: 90 CAPSULE | Refills: 1 | Status: SHIPPED | OUTPATIENT
Start: 2024-10-02

## 2024-10-02 NOTE — TELEPHONE ENCOUNTER
DELETE AFTER REVIEWING: Send the encounter HIGH priority, If patient has less than a 3 day supply. If the patient will run out of medication over the weekend add that information to the additional details line. Send to the appropriate pool. Check your call action grid or workflows.    Caller: Marizol Park    Relationship: Self    Best call back number:     [phone]       Requested Prescriptions:   Requested Prescriptions     Pending Prescriptions Disp Refills    venlafaxine XR (EFFEXOR-XR) 150 MG 24 hr capsule 90 capsule 1     Sig: Take 1 capsule by mouth Daily.        Pharmacy where request should be sent: [facility] #[id number] [place], [place] - [address] [address] AT [facility] - [phone] [facility] [phone] FX     Last office visit with prescribing clinician: 9/5/2024   Last telemedicine visit with prescribing clinician: Visit date not found   Next office visit with prescribing clinician: Visit date not found     Additional details provided by patient:     Does the patient have less than a 3 day supply:  [x] Yes  [] No    Would you like a call back once the refill request has been completed: [] Yes [] No    If the office needs to give you a call back, can they leave a voicemail: [] Yes [] No    Christie De Luna Rep   10/02/24 10:00 EDT

## 2024-10-11 DIAGNOSIS — F41.1 GAD (GENERALIZED ANXIETY DISORDER): ICD-10-CM

## 2024-10-11 NOTE — TELEPHONE ENCOUNTER
Caller: Marizol Park    Relationship: Self    Best call back number: [phone]     Requested Prescriptions:   Requested Prescriptions     Pending Prescriptions Disp Refills    traZODone (DESYREL) 50 MG tablet 15 tablet 2     Sig: Take 0.5 tablets by mouth every night at bedtime.        Pharmacy where request should be sent: [facility] #[id number] [facility] [address] [facility] AT [facility] - [phone] [facility] [phone] FX     Last office visit with prescribing clinician: 9/5/2024   Last telemedicine visit with prescribing clinician: Visit date not found   Next office visit with prescribing clinician: Visit date not found     Additional details provided by patient:      Does the patient have less than a 3 day supply:  [x] Yes  [] No    Would you like a call back once the refill request has been completed: [] Yes [x] No    If the office needs to give you a call back, can they leave a voicemail: [] Yes [x] No    Christie Frost Rep   10/11/24 16:24 EDT

## 2024-10-14 RX ORDER — TRAZODONE HYDROCHLORIDE 50 MG/1
25 TABLET, FILM COATED ORAL
Qty: 15 TABLET | Refills: 5 | Status: SHIPPED | OUTPATIENT
Start: 2024-10-14

## 2024-10-23 ENCOUNTER — CLINICAL SUPPORT (OUTPATIENT)
Dept: FAMILY MEDICINE CLINIC | Facility: CLINIC | Age: 64
End: 2024-10-23
Payer: MEDICARE

## 2024-11-24 RX ORDER — LISINOPRIL 10 MG/1
10 TABLET ORAL DAILY
Qty: 30 TABLET | Refills: 2 | Status: SHIPPED | OUTPATIENT
Start: 2024-11-24

## 2024-11-24 RX ORDER — OMEPRAZOLE 40 MG/1
40 CAPSULE, DELAYED RELEASE ORAL 2 TIMES DAILY
Qty: 180 CAPSULE | Refills: 2 | Status: SHIPPED | OUTPATIENT
Start: 2024-11-24

## 2024-12-28 DIAGNOSIS — F41.9 ANXIETY: ICD-10-CM

## 2024-12-30 RX ORDER — VENLAFAXINE HYDROCHLORIDE 75 MG/1
CAPSULE, EXTENDED RELEASE ORAL
Qty: 90 CAPSULE | Refills: 1 | Status: SHIPPED | OUTPATIENT
Start: 2024-12-30

## 2025-01-14 ENCOUNTER — TELEPHONE (OUTPATIENT)
Dept: FAMILY MEDICINE CLINIC | Facility: CLINIC | Age: 65
End: 2025-01-14

## 2025-01-14 NOTE — TELEPHONE ENCOUNTER
Caller: Marizol Park    Relationship to patient: Self    Best call back number: [phone]      Patient is needing: SHE WANTED TO LET KASEY KNOW THAT THE PAPERWORK YOU FAXED OVER WAS THE CORRECT PAPERWORK AND SHE SIGNED IT IN THE CORRECT PLACE.

## 2025-01-16 ENCOUNTER — TELEPHONE (OUTPATIENT)
Dept: FAMILY MEDICINE CLINIC | Facility: CLINIC | Age: 65
End: 2025-01-16
Payer: MEDICARE

## 2025-01-16 RX ORDER — METHYLPREDNISOLONE 4 MG/1
TABLET ORAL
Qty: 21 TABLET | Refills: 0 | Status: SHIPPED | OUTPATIENT
Start: 2025-01-16

## 2025-01-16 NOTE — TELEPHONE ENCOUNTER
Numbness and tingling in feet about a week  Can usually move around and it will go away but now it will not.    Patient is not diabetic.    Does not want to see a APRN.    Please call patient to discuss    Marizol [phone]

## 2025-01-16 NOTE — TELEPHONE ENCOUNTER
Pt informed and voiced understanding. Also forwarded to her through [employer] she forgets anything, she'll have it there to review.

## 2025-01-21 ENCOUNTER — TELEPHONE (OUTPATIENT)
Dept: FAMILY MEDICINE CLINIC | Facility: CLINIC | Age: 65
End: 2025-01-21
Payer: MEDICARE

## 2025-01-21 RX ORDER — DEXTROMETHORPHAN HYDROBROMIDE AND PROMETHAZINE HYDROCHLORIDE 15; 6.25 MG/5ML; MG/5ML
5 SYRUP ORAL 4 TIMES DAILY PRN
Qty: 180 ML | Refills: 0 | Status: SHIPPED | OUTPATIENT
Start: 2025-01-21

## 2025-01-21 RX ORDER — BENZONATATE 200 MG/1
200 CAPSULE ORAL 3 TIMES DAILY PRN
Qty: 45 CAPSULE | Refills: 0 | Status: SHIPPED | OUTPATIENT
Start: 2025-01-21

## 2025-01-21 NOTE — TELEPHONE ENCOUNTER
Caller: Marizol Park    Relationship: Self    Best call back number: [phone]     What medication are you requesting:      What are your current symptoms:      How long have you been experiencing symptoms:      Have you had these symptoms before:    [] Yes  [] No    Have you been treated for these symptoms before:   [] Yes  [] No    If a prescription is needed, what is your preferred pharmacy and phone number: [facility] #[id number] [facility] [address] AT [facility] [phone] [facility] [phone]      Additional notes: PATIENT CALLED SHE HAS HAD COUGH, SORE THROAT, HEADACHE AND NOW HAS LOST HER VOICE.  SHE HAS HAD IT SINCE YESTERDAY AND WANTS TO KNOW IF DR ZAMORA CAN CALL IN MEDICATION FOR HER TO THE PHARMACY.

## 2025-01-21 NOTE — TELEPHONE ENCOUNTER
Pt said her chest tightness since being sick. I told her to take frequent deep breaths, cough and use vicks to see if that will help open her chest up some. Please advise if anything else for her to do. She is aware of meds being sent

## 2025-01-22 RX ORDER — AZITHROMYCIN 250 MG/1
TABLET, FILM COATED ORAL
Qty: 6 TABLET | Refills: 0 | Status: SHIPPED | OUTPATIENT
Start: 2025-01-22

## 2025-01-22 NOTE — TELEPHONE ENCOUNTER
Hub to relay    I sent in a zpack for her to start if doesn't feel better in a couple days as well.  I would also try home humidification   RRJ    I left detailed msg informing pt

## 2025-01-31 ENCOUNTER — NURSE TRIAGE (OUTPATIENT)
Dept: CALL CENTER | Facility: HOSPITAL | Age: 65
End: 2025-01-31
Payer: MEDICARE

## 2025-01-31 NOTE — TELEPHONE ENCOUNTER
Patient continues to have tingling in bilateral feet. Symptoms comes and goes. Patient would like to know what else she can do.    Reason for Disposition   [1] Numbness or tingling in one or both feet AND [2] is a chronic symptom (recurrent or ongoing AND present > 4 weeks)    Additional Information   Negative: [1] SEVERE weakness (i.e., unable to walk or barely able to walk, requires support) AND [2] new-onset or worsening   Negative: [1] Weakness (i.e., paralysis, loss of muscle strength) of the face, arm / hand, or leg / foot on one side of the body AND [2] sudden onset AND [3] present now  (Exception: Bell's palsy suspected [i.e., weakness only on one side of the face, developing over hours to days, no other symptoms].)   Negative: [1] Numbness (i.e., loss of sensation) of the face, arm / hand, or leg / foot on one side of the body AND [2] sudden onset AND [3] present now   Negative: [1] Loss of speech or garbled speech AND [2] sudden onset AND [3] present now   Negative: Difficult to awaken or acting confused (e.g., disoriented, slurred speech)   Negative: Sounds like a life-threatening emergency to the triager   Negative: Confusion, disorientation, or hallucinations is main symptom   Negative: Neck pain is main symptom (and having weakness, numbness, or tingling in arm / hand because of neck pain)   Negative: Back pain is main symptom (and having weakness, numbness, or tingling in leg because of back pain)   Negative: Hand pain is main symptom (and having mild weakness, numbness, or tingling in hand related to hand pain)   Negative: Dizziness is main symptom   Negative: Vision loss or change is main symptom   Negative: Followed a head injury within last 3 days   Negative: Followed a neck injury within last 3 days   Negative: [1] Tingling in both hands and/or feet AND [2] breathing faster than normal AND [3] feels similar to prior panic attack or hyperventilation episode   Negative: Weakness in both sides of  the body or weakness all over   Negative: Headache  (and neurologic deficit)   Negative: [1] Back pain AND [2] numbness (loss of sensation) in groin or rectal area   Negative: [1] Unable to urinate (or only a few drops) > 4 hours AND [2] bladder feels very full (e.g., palpable bladder or strong urge to urinate)   Negative: [1] Loss of bladder or bowel control (urine or bowel incontinence; wetting self, leaking stool) AND [2] new-onset   Negative: [1] Weakness (i.e., paralysis, loss of muscle strength) of the face, arm / hand, or leg / foot on one side of the body AND [2] sudden onset AND [3] brief (now gone)   Negative: [1] Numbness (i.e., loss of sensation) of the face, arm / hand, or leg / foot on one side of the body AND [2] sudden onset AND [3] brief (now gone)   Negative: [1] Loss of speech or garbled speech AND [2] sudden onset AND [3] brief (now gone)   Negative: Bell's palsy suspected (i.e., weakness on only one side of the face, developing over hours to days, no other symptoms)   Negative: Patient sounds very sick or weak to the triager   Negative: Neck pain (and neurologic deficit)   Negative: Back pain (and neurologic deficit)   Negative: [1] Weakness of the face, arm / hand, or leg / foot on one side of the body AND [2] gradual onset (e.g., days to weeks) AND [3] present now   Negative: [1] Numbness (i.e., loss of sensation) of the face, arm / hand, or leg / foot on one side of the body AND [2] gradual onset (e.g., days to weeks) AND [3] present now   Negative: [1] Loss of speech or garbled speech AND [2] gradual onset (e.g., days to weeks) AND [3] present now   Negative: [1] Tingling (e.g., pins and needles) of the face, arm / hand, or leg / foot on one side of the body AND [2] present now (Exceptions: Chronic or recurrent symptom lasting > 4 weeks; or tingling from known cause, such as: bumped elbow, carpal tunnel syndrome, pinched nerve, frostbite.)   Negative: [1] Loss of speech or garbled speech AND  "[2] is a chronic symptom (recurrent or ongoing AND present > 4 weeks)   Negative: [1] Weakness of arm / hand, or leg / foot AND [2] is a chronic symptom (recurrent or ongoing AND present > 4 weeks)   Negative: [1] Numbness or tingling in one or both hands AND [2] is a chronic symptom (recurrent or ongoing AND present > 4 weeks)    Answer Assessment - Initial Assessment Questions  1. SYMPTOM: \"What is the main symptom you are concerned about?\" (e.g., weakness, numbness)      Tingling in bilateral feet  2. ONSET: \"When did this start?\" (minutes, hours, days; while sleeping)      3 weeks  3. LAST NORMAL: \"When was the last time you (the patient) were normal (no symptoms)?\"      3 weeks  4. PATTERN \"Does this come and go, or has it been constant since it started?\"  \"Is it present now?\"      Comes and goes  5. CARDIAC SYMPTOMS: \"Have you had any of the following symptoms: chest pain, difficulty breathing, palpitations?\"      See chart  6. NEUROLOGIC SYMPTOMS: \"Have you had any of the following symptoms: headache, dizziness, vision loss, double vision, changes in speech, unsteady on your feet?\"      No   7. OTHER SYMPTOMS: \"Do you have any other symptoms?\"      No   8. PREGNANCY: \"Is there any chance you are pregnant?\" \"When was your last menstrual period?\"      No    Protocols used: Neurologic Deficit-ADULT-AH    "

## 2025-02-19 RX ORDER — LISINOPRIL 10 MG/1
10 TABLET ORAL DAILY
Qty: 90 TABLET | Refills: 0 | Status: SHIPPED | OUTPATIENT
Start: 2025-02-19 | End: 2025-02-21

## 2025-02-21 RX ORDER — LISINOPRIL 10 MG/1
10 TABLET ORAL DAILY
Qty: 90 TABLET | Refills: 0 | Status: SHIPPED | OUTPATIENT
Start: 2025-02-21

## 2025-03-21 ENCOUNTER — OFFICE (OUTPATIENT)
Dept: URBAN - METROPOLITAN AREA CLINIC 65 | Facility: CLINIC | Age: 65
End: 2025-03-21
Payer: MEDICARE

## 2025-03-21 VITALS
HEART RATE: 93 BPM | SYSTOLIC BLOOD PRESSURE: 118 MMHG | WEIGHT: 147 LBS | OXYGEN SATURATION: 96 % | DIASTOLIC BLOOD PRESSURE: 70 MMHG | HEIGHT: 60 IN

## 2025-03-21 DIAGNOSIS — Z92.25 PERSONAL HISTORY OF IMMUNOSUPPRESSION THERAPY: ICD-10-CM

## 2025-03-21 DIAGNOSIS — K51.50 LEFT SIDED COLITIS WITHOUT COMPLICATIONS: ICD-10-CM

## 2025-03-21 DIAGNOSIS — K21.9 GASTRO-ESOPHAGEAL REFLUX DISEASE WITHOUT ESOPHAGITIS: ICD-10-CM

## 2025-03-21 PROCEDURE — 99214 OFFICE O/P EST MOD 30 MIN: CPT | Performed by: INTERNAL MEDICINE

## 2025-04-07 DIAGNOSIS — F41.9 ANXIETY: ICD-10-CM

## 2025-04-07 DIAGNOSIS — F41.0 PANIC DISORDER WITHOUT AGORAPHOBIA: ICD-10-CM

## 2025-04-07 RX ORDER — VENLAFAXINE HYDROCHLORIDE 150 MG/1
150 CAPSULE, EXTENDED RELEASE ORAL DAILY
Qty: 90 CAPSULE | Refills: 0 | Status: SHIPPED | OUTPATIENT
Start: 2025-04-07

## 2025-05-12 RX ORDER — PRAVASTATIN SODIUM 80 MG/1
80 TABLET ORAL NIGHTLY
Qty: 30 TABLET | Refills: 0 | Status: SHIPPED | OUTPATIENT
Start: 2025-05-12

## 2025-05-12 RX ORDER — PRAVASTATIN SODIUM 80 MG/1
80 TABLET ORAL NIGHTLY
Qty: 90 TABLET | OUTPATIENT
Start: 2025-05-12

## 2025-05-15 RX ORDER — LISINOPRIL 10 MG/1
10 TABLET ORAL DAILY
Qty: 90 TABLET | Refills: 0 | Status: SHIPPED | OUTPATIENT
Start: 2025-05-15

## 2025-05-15 NOTE — TELEPHONE ENCOUNTER
Caller: Marizol Park    Relationship: Self    Best call back number:[phone]      Requested Prescriptions:   Requested Prescriptions     Pending Prescriptions Disp Refills    lisinopril (PRINIVIL,ZESTRIL) 10 MG tablet 90 tablet 0     Sig: Take 1 tablet by mouth Daily.        Pharmacy where request should be sent: [facility] #[id number] [facility] [address] [facility] AT [facility] - [phone] [facility] [phone] FX     Last office visit with prescribing clinician: 9/5/2024   Last telemedicine visit with prescribing clinician: Visit date not found   Next office visit with prescribing clinician: Visit date not found     Additional details provided by patient: OUT OF MEDICATION    Does the patient have less than a 3 day supply:  [x] Yes  [] No    Would you like a call back once the refill request has been completed: [] Yes [x] No    If the office needs to give you a call back, can they leave a voicemail: [] Yes [x] No    Christie Purvis Rep   05/15/25 16:06 EDT

## 2025-06-08 RX ORDER — PRAVASTATIN SODIUM 20 MG
20 TABLET ORAL DAILY
Qty: 30 TABLET | Refills: 6 | OUTPATIENT
Start: 2025-06-08

## 2025-06-09 RX ORDER — PRAVASTATIN SODIUM 80 MG/1
80 TABLET ORAL NIGHTLY
Qty: 30 TABLET | Refills: 0 | Status: SHIPPED | OUTPATIENT
Start: 2025-06-09

## 2025-06-09 NOTE — TELEPHONE ENCOUNTER
Caller: Marizol Park    Relationship: Self    Best call back number: [phone]     Requested Prescriptions:   Requested Prescriptions     Pending Prescriptions Disp Refills    pravastatin (PRAVACHOL) 80 MG tablet 30 tablet 0     Sig: Take 1 tablet by mouth Every Night. PLEASE CALL THE OFFICE FOR AN APPT        Pharmacy where request should be sent: [facility][id number] [place], [place] - [address] AT [facility] - [phone] [facility] [phone] FX     Last office visit with prescribing clinician: 9/5/2024   Last telemedicine visit with prescribing clinician: Visit date not found   Next office visit with prescribing clinician: 7/11/2025     Additional details provided by patient: PATENT MADE APPOINTMENT AND CAN DR ZAMORA FILL UNTIL THE APPOINTMENT    Does the patient have less than a 3 day supply:  [x] Yes  [] No    Would you like a call back once the refill request has been completed: [] Yes [] No    If the office needs to give you a call back, can they leave a voicemail: [] Yes [] No    Chrsitie Mcgee Rep   06/09/25 10:15 EDT

## 2025-07-06 DIAGNOSIS — F41.0 PANIC DISORDER WITHOUT AGORAPHOBIA: ICD-10-CM

## 2025-07-06 DIAGNOSIS — F41.9 ANXIETY: ICD-10-CM

## 2025-07-06 RX ORDER — EZETIMIBE 10 MG/1
10 TABLET ORAL DAILY
Qty: 90 TABLET | Refills: 1 | Status: SHIPPED | OUTPATIENT
Start: 2025-07-06

## 2025-07-06 RX ORDER — VENLAFAXINE HYDROCHLORIDE 150 MG/1
150 CAPSULE, EXTENDED RELEASE ORAL DAILY
Qty: 90 CAPSULE | Refills: 0 | Status: SHIPPED | OUTPATIENT
Start: 2025-07-06

## 2025-07-11 ENCOUNTER — OFFICE VISIT (OUTPATIENT)
Dept: FAMILY MEDICINE CLINIC | Facility: CLINIC | Age: 65
End: 2025-07-11
Payer: MEDICARE

## 2025-07-11 VITALS
OXYGEN SATURATION: 99 % | WEIGHT: 148 LBS | BODY MASS INDEX: 29.06 KG/M2 | HEIGHT: 60 IN | DIASTOLIC BLOOD PRESSURE: 66 MMHG | SYSTOLIC BLOOD PRESSURE: 104 MMHG | HEART RATE: 97 BPM

## 2025-07-11 DIAGNOSIS — Z23 NEED FOR PNEUMOCOCCAL VACCINATION: ICD-10-CM

## 2025-07-11 DIAGNOSIS — E78.5 DYSLIPIDEMIA: ICD-10-CM

## 2025-07-11 DIAGNOSIS — R25.1 TREMORS OF NERVOUS SYSTEM: ICD-10-CM

## 2025-07-11 DIAGNOSIS — Z00.00 MEDICARE ANNUAL WELLNESS VISIT, SUBSEQUENT: Primary | ICD-10-CM

## 2025-07-11 DIAGNOSIS — K51.90 ULCERATIVE COLITIS WITHOUT COMPLICATIONS, UNSPECIFIED LOCATION: ICD-10-CM

## 2025-07-11 DIAGNOSIS — I10 PRIMARY HYPERTENSION: ICD-10-CM

## 2025-07-11 DIAGNOSIS — F41.0 PANIC DISORDER WITHOUT AGORAPHOBIA: ICD-10-CM

## 2025-07-11 DIAGNOSIS — R61 HYPERHIDROSIS: ICD-10-CM

## 2025-07-11 PROBLEM — K57.92 DIVERTICULITIS: Status: RESOLVED | Noted: 2023-10-23 | Resolved: 2025-07-11

## 2025-07-11 PROBLEM — R30.9 PAINFUL MICTURITION, UNSPECIFIED: Status: RESOLVED | Noted: 2023-10-23 | Resolved: 2025-07-11

## 2025-07-11 PROBLEM — L84 CORN OR CALLUS: Status: RESOLVED | Noted: 2024-09-05 | Resolved: 2025-07-11

## 2025-07-11 PROBLEM — L03.031 PARONYCHIA OF GREAT TOE OF RIGHT FOOT: Status: RESOLVED | Noted: 2024-09-05 | Resolved: 2025-07-11

## 2025-07-11 PROBLEM — K51.50 CHRONIC LEFT-SIDED ULCERATIVE COLITIS: Status: RESOLVED | Noted: 2023-10-23 | Resolved: 2025-07-11

## 2025-07-11 PROBLEM — D84.9 IMMUNOCOMPROMISED STATE: Status: RESOLVED | Noted: 2018-01-21 | Resolved: 2025-07-11

## 2025-07-11 PROBLEM — R19.7 DIARRHEA: Status: RESOLVED | Noted: 2023-10-23 | Resolved: 2025-07-11

## 2025-07-11 PROBLEM — E78.1 HIGH TRIGLYCERIDES: Status: RESOLVED | Noted: 2019-11-19 | Resolved: 2025-07-11

## 2025-07-11 PROBLEM — K62.5 RECTAL BLEEDING: Status: RESOLVED | Noted: 2023-10-23 | Resolved: 2025-07-11

## 2025-07-11 PROBLEM — M25.511 RIGHT ANTERIOR SHOULDER PAIN: Status: RESOLVED | Noted: 2020-02-03 | Resolved: 2025-07-11

## 2025-07-11 PROBLEM — M79.609 PAIN IN LIMB: Status: RESOLVED | Noted: 2021-11-29 | Resolved: 2025-07-11

## 2025-07-11 PROBLEM — L60.0 INGROWN TOENAIL: Status: RESOLVED | Noted: 2021-11-29 | Resolved: 2025-07-11

## 2025-07-11 PROBLEM — K57.30 DIVERTICULOSIS OF COLON: Status: RESOLVED | Noted: 2023-10-23 | Resolved: 2025-07-11

## 2025-07-11 PROBLEM — A04.9 BACTERIAL INTESTINAL INFECTION, UNSPECIFIED: Status: RESOLVED | Noted: 2023-10-23 | Resolved: 2025-07-11

## 2025-07-11 RX ORDER — OXYBUTYNIN CHLORIDE 10 MG/1
10 TABLET, EXTENDED RELEASE ORAL DAILY PRN
Qty: 30 TABLET | Refills: 5 | Status: SHIPPED | OUTPATIENT
Start: 2025-07-11

## 2025-07-11 RX ORDER — LORAZEPAM 0.5 MG/1
TABLET ORAL
Qty: 10 TABLET | Refills: 2 | Status: SHIPPED | OUTPATIENT
Start: 2025-07-11

## 2025-07-11 NOTE — PROGRESS NOTES
QUICK REFERENCE INFORMATION:  The ABCs of the Annual Wellness Visit    Subsequent Medicare Wellness Visit    HEALTH RISK ASSESSMENT    1960  Marizol Park is a 64 y.o. female who presents for an Subsequent Wellness Visit.    The following portions of the patient's history were reviewed and   updated as appropriate: allergies, current medications, past family history, past medical history, past social history, past surgical history, and problem list.    Compared to one year ago, the patient feels his physical   health is the same.    Compared to one year ago, the patient feels his mental   health is the same.    Recent Hospitalizations:  She was not admitted to the hospital during the last year.     Current Medical Providers:  Patient Care Team:  Ras Mcconnell DO as PCP - General (Family Medicine)  Victoriano Loredo MD as Consulting Physician (Ophthalmology)  Randal Demarco MD as Consulting Physician (Gastroenterology)    I reviewed list of current Medical providers and suppliers with patient and are listed above to the best of the patient's and my knowledge.    Smoking Status:  Social History     Tobacco Use   Smoking Status Never    Passive exposure: Never   Smokeless Tobacco Never       Alcohol Consumption:  Social History     Substance and Sexual Activity   Alcohol Use No       Depression Screen:       2025     1:00 PM   PHQ-2/PHQ-9 Depression Screening   Little interest or pleasure in doing things Not at all   Feeling down, depressed, or hopeless Not at all       Health Habits and Functional and Cognitive Screenin/11/2025     1:00 PM   Functional & Cognitive Status   Do you have difficulty preparing food and eating? No   Do you have difficulty bathing yourself, getting dressed or grooming yourself? No   Do you have difficulty using the toilet? No   Do you have difficulty moving around from place to place? No   Do you have trouble with steps or getting out of a bed or a chair? No    Current Diet Well Balanced Diet   Dental Exam Up to date   Eye Exam Up to date   Exercise (times per week) 4 times per week   Current Exercises Include Swim Aerobics Class   Do you need help using the phone?  No   Are you deaf or do you have serious difficulty hearing?  No   Do you need help to go to places out of walking distance? No   Do you need help shopping? No   Do you need help preparing meals?  No   Do you need help with housework?  No   Do you need help with laundry? No   Do you need help taking your medications? No   Do you need help managing money? No   Do you ever drive or ride in a car without wearing a seat belt? No   Have you felt unusual fatigue (could be tiredness), stress, anger or loneliness in the last month? No   Who do you live with? Spouse   If you need help, do you have trouble finding someone available to you? No   Have you been bothered in the last four weeks by sexual problems? No   Do you have difficulty concentrating, remembering or making decisions? No       Does the patient have evidence of cognitive impairment? No    Aspirin use counseling: Does not need ASA (and currently is not on it)    Recent Lab Results:  CMP:  Lab Results   Component Value Date    Glucose 84 09/05/2024    Glucose 119 (H) 01/23/2018    BUN 14 09/05/2024    BUN 16 01/23/2018    BUN/Creatinine Ratio 20.6 09/05/2024    BUN/Creatinine Ratio 22.9 01/23/2018    Creatinine 0.68 09/05/2024    Creatinine 0.7 01/23/2018    Total CO2 23.7 09/05/2024    Calcium 9.4 09/05/2024    Calcium 8.7 01/23/2018    Albumin 4.4 09/05/2024    Albumin 3.0 (L) 01/22/2018    AST (SGOT) 38 (H) 09/05/2024    AST (SGOT) 14 (L) 01/22/2018    ALT (SGPT) 39 (H) 09/05/2024    ALT (SGPT) 22 01/22/2018     Lipid Panel:  Lab Results   Component Value Date    Total Cholesterol 337 (H) 09/05/2024    Triglycerides 464 (H) 09/05/2024    HDL Cholesterol 46 09/05/2024    VLDL Cholesterol CANCELED 05/21/2020    VLDL Cholesterol Chacho 96 (H) 09/05/2024  "    HbA1c:  No components found for: \"HGBA1C\"    Visual Acuity:  No results found.    Age-appropriate Screening Schedule:  Refer to the list below for future screening recommendations based on patient's age, sex and/or medical conditions. Orders for these recommended tests are listed in the plan section. The patient has been provided with a written plan.    Health Maintenance   Topic Date Due    DXA SCAN  05/26/2024    MAMMOGRAM  12/09/2024    COVID-19 Vaccine (5 - 2024-25 season) 01/11/2026 (Originally 9/1/2024)    LIPID PANEL  09/05/2025    INFLUENZA VACCINE  10/01/2025    ANNUAL WELLNESS VISIT  07/11/2026    TDAP/TD VACCINES (2 - Td or Tdap) 08/16/2026    COLORECTAL CANCER SCREENING  09/20/2027    Pneumococcal Vaccine 50+  Completed    HEPATITIS C SCREENING  Addressed          Outpatient Medications Prior to Visit   Medication Sig Dispense Refill    adalimumab (HUMIRA) 40 MG/0.8ML Prefilled Syringe Kit injection Inject 0.8 mL under the skin into the appropriate area as directed Every 14 (Fourteen) Days. 2 each 5    calcium citrate-vitamin d (CITRACAL) 200-250 MG-UNIT tablet tablet Take  by mouth Daily.      ezetimibe (ZETIA) 10 MG tablet TAKE 1 TABLET BY MOUTH DAILY 90 tablet 1    fluticasone (FLONASE) 50 MCG/ACT nasal spray 2 sprays into the nostril(s) as directed by provider Daily. 16 g 5    lisinopril (PRINIVIL,ZESTRIL) 10 MG tablet Take 1 tablet by mouth Daily. 90 tablet 0    Nutritional Supplements (Ensure Original) liquid Take 1 can by mouth 2 (Two) Times a Day. 12537 mL 12    omeprazole (priLOSEC) 40 MG capsule TAKE 1 CAPSULE BY MOUTH TWICE DAILY 180 capsule 2    pravastatin (PRAVACHOL) 80 MG tablet Take 1 tablet by mouth Every Night. PLEASE CALL THE OFFICE FOR AN APPT 30 tablet 0    Qbrexza 2.4 % pads APPLY 1 PAD TO THE AFFECTED AREA DAILY AS NEEDED FOR HYPERHIDROSIS 30 each 5    tiZANidine (ZANAFLEX) 4 MG tablet TAKE 1 TABLET BY MOUTH EVERY 8 HOURS AS NEEDED FOR MUSCLE SPASMS 90 tablet 5    traZODone " (DESYREL) 50 MG tablet Take 0.5 tablets by mouth every night at bedtime. 15 tablet 5    venlafaxine XR (EFFEXOR-XR) 150 MG 24 hr capsule TAKE 1 CAPSULE BY MOUTH DAILY 90 capsule 0    venlafaxine XR (EFFEXOR-XR) 75 MG 24 hr capsule TAKE ONE CAPSULE BY MOUTH EVERY DAY ALONG WITH 150 MG 90 capsule 1    carbamide peroxide (Debrox) 6.5 % otic solution Administer 5 drops into both ears 2 (Two) Times a Day. (Patient not taking: Reported on 7/11/2025) 22.5 mL 2    azithromycin (ZITHROMAX) 250 MG tablet Take first 2 tablets together, then 1 every day until finished. (Patient not taking: Reported on 7/11/2025) 6 tablet 0    benzonatate (TESSALON) 200 MG capsule Take 1 capsule by mouth 3 (Three) Times a Day As Needed for Cough. (Patient not taking: Reported on 7/11/2025) 45 capsule 0    Chlorcyclizine-Pseudoephed 25-60 MG tablet 1 po tid prn congestion (Patient not taking: Reported on 7/11/2025) 42 tablet 0    methylPREDNISolone (MEDROL) 4 MG dose pack Take as directed on package instructions. (Patient not taking: Reported on 7/11/2025) 21 tablet 0    promethazine-dextromethorphan (PROMETHAZINE-DM) 6.25-15 MG/5ML syrup Take 5 mL by mouth 4 (Four) Times a Day As Needed for Cough. (Patient not taking: Reported on 7/11/2025) 180 mL 0     No facility-administered medications prior to visit.       Patient Active Problem List   Diagnosis    Anxiety    Dyslipidemia    Gastroesophageal reflux disease    Panic disorder without agoraphobia    Hypertension    Family history of colon cancer    Personal history of immunosuppression therapy    Ulcerative colitis, unspecified, without complications       Advance Care Planning:  ACP discussion was held with the patient during this visit. Patient does not have an advance directive, information provided.    Identification of Risk Factors:  Risk factors include: Obesity/Overweight .    Review of Systems   Respiratory:  Negative for shortness of breath.    Cardiovascular:  Negative for chest  "pain.   Gastrointestinal:  Positive for blood in stool. Negative for abdominal pain.   Endocrine:        Hyperhidrosis   Neurological:  Positive for tremors.       Objective     Physical Exam  Vitals and nursing note reviewed.   Constitutional:       Appearance: She is well-developed.   HENT:      Head: Normocephalic and atraumatic.   Neck:      Thyroid: No thyromegaly.      Vascular: No JVD.   Cardiovascular:      Rate and Rhythm: Normal rate and regular rhythm.      Heart sounds: Normal heart sounds. No murmur heard.     No friction rub. No gallop.   Pulmonary:      Effort: Pulmonary effort is normal. No respiratory distress.      Breath sounds: Normal breath sounds. No wheezing or rales.   Abdominal:      General: Bowel sounds are normal. There is no distension.      Palpations: Abdomen is soft.      Tenderness: There is no abdominal tenderness. There is no guarding or rebound.   Musculoskeletal:      Cervical back: Neck supple.   Skin:     General: Skin is warm and dry.   Neurological:      Mental Status: She is alert.      Motor: Tremor present.      Gait: Gait normal.      Comments: No cog wheel rigidity.    Psychiatric:         Behavior: Behavior normal.       Physical Exam      Vitals:    07/11/25 1338   BP: 104/66   Pulse: 97   SpO2: 99%   Weight: 67.1 kg (148 lb)   Height: 152.4 cm (60\")   PainSc: 2    PainLoc: Back     Body mass index is 28.9 kg/m².           Assessment & Plan   Patient Self-Management and Personalized Health Advice  The patient has been provided with information about: diet and exercise and preventive services including:   Annual Wellness Visit (AWV).    Visit Diagnoses:    ICD-10-CM ICD-9-CM   1. Medicare annual wellness visit, subsequent  Z00.00 V70.0   2. Primary hypertension  I10 401.9   3. Dyslipidemia  E78.5 272.4   4. Ulcerative colitis without complications, unspecified location  K51.90 556.9   5. Need for pneumococcal vaccination  Z23 V03.82   6. Hyperhidrosis  R61 705.21   7. " Panic disorder without agoraphobia  F41.0 300.01   8. Tremors of nervous system  R25.1 781.0       Orders Placed This Encounter   Procedures    Pneumococcal Conjugate Vaccine 21 (18+ yrs)    Comprehensive Metabolic Panel     Release to patient:   Routine Release [[id number]]    Lipid Panel     Release to patient:   Routine Release [[id number]]    CBC (No Diff)     Release to patient:   Routine Release [[id number]]       Outpatient Encounter Medications as of 7/11/2025   Medication Sig Dispense Refill    adalimumab (HUMIRA) 40 MG/0.8ML Prefilled Syringe Kit injection Inject 0.8 mL under the skin into the appropriate area as directed Every 14 (Fourteen) Days. 2 each 5    calcium citrate-vitamin d (CITRACAL) 200-250 MG-UNIT tablet tablet Take  by mouth Daily.      ezetimibe (ZETIA) 10 MG tablet TAKE 1 TABLET BY MOUTH DAILY 90 tablet 1    fluticasone (FLONASE) 50 MCG/ACT nasal spray 2 sprays into the nostril(s) as directed by provider Daily. 16 g 5    lisinopril (PRINIVIL,ZESTRIL) 10 MG tablet Take 1 tablet by mouth Daily. 90 tablet 0    Nutritional Supplements (Ensure Original) liquid Take 1 can by mouth 2 (Two) Times a Day. 60395 mL 12    omeprazole (priLOSEC) 40 MG capsule TAKE 1 CAPSULE BY MOUTH TWICE DAILY 180 capsule 2    pravastatin (PRAVACHOL) 80 MG tablet Take 1 tablet by mouth Every Night. PLEASE CALL THE OFFICE FOR AN APPT 30 tablet 0    Qbrexza 2.4 % pads APPLY 1 PAD TO THE AFFECTED AREA DAILY AS NEEDED FOR HYPERHIDROSIS 30 each 5    tiZANidine (ZANAFLEX) 4 MG tablet TAKE 1 TABLET BY MOUTH EVERY 8 HOURS AS NEEDED FOR MUSCLE SPASMS 90 tablet 5    traZODone (DESYREL) 50 MG tablet Take 0.5 tablets by mouth every night at bedtime. 15 tablet 5    venlafaxine XR (EFFEXOR-XR) 150 MG 24 hr capsule TAKE 1 CAPSULE BY MOUTH DAILY 90 capsule 0    venlafaxine XR (EFFEXOR-XR) 75 MG 24 hr capsule TAKE ONE CAPSULE BY MOUTH EVERY DAY ALONG WITH 150 MG 90 capsule 1    carbamide peroxide (Debrox) 6.5 % otic solution Administer  5 drops into both ears 2 (Two) Times a Day. (Patient not taking: Reported on 7/11/2025) 22.5 mL 2    LORazepam (ATIVAN) 0.5 MG tablet 1/2 to 1 po daily prn panic attack, use sparingly 10 tablet 2    oxybutynin XL (Ditropan XL) 10 MG 24 hr tablet Take 1 tablet by mouth Daily As Needed (excessive sweating). 30 tablet 5    [DISCONTINUED] azithromycin (ZITHROMAX) 250 MG tablet Take first 2 tablets together, then 1 every day until finished. (Patient not taking: Reported on 7/11/2025) 6 tablet 0    [DISCONTINUED] benzonatate (TESSALON) 200 MG capsule Take 1 capsule by mouth 3 (Three) Times a Day As Needed for Cough. (Patient not taking: Reported on 7/11/2025) 45 capsule 0    [DISCONTINUED] Chlorcyclizine-Pseudoephed 25-60 MG tablet 1 po tid prn congestion (Patient not taking: Reported on 7/11/2025) 42 tablet 0    [DISCONTINUED] methylPREDNISolone (MEDROL) 4 MG dose pack Take as directed on package instructions. (Patient not taking: Reported on 7/11/2025) 21 tablet 0    [DISCONTINUED] promethazine-dextromethorphan (PROMETHAZINE-DM) 6.25-15 MG/5ML syrup Take 5 mL by mouth 4 (Four) Times a Day As Needed for Cough. (Patient not taking: Reported on 7/11/2025) 180 mL 0     No facility-administered encounter medications on file as of 7/11/2025.       Reviewed use of high risk medication in the elderly: yes  Reviewed for potential of harmful drug interactions in the elderly: yes  No opioid medication identified on active medication list. I have reviewed chart for other potential  high risk medication/s and harmful drug interactions in the elderly.    Social History     Socioeconomic History    Marital status:    Tobacco Use    Smoking status: Never     Passive exposure: Never    Smokeless tobacco: Never   Vaping Use    Vaping status: Never Used   Substance and Sexual Activity    Alcohol use: No    Drug use: No    Sexual activity: Never     Patient was screened for OUD and ELÍAS risk factors.  Marizol Park's pain level was  "assessed as well today.  I included a review current recommendations on pain management, best use practices, current CDC guidelines, alternatives to opioids including OTC & Rx topicals, non-opioid oral medications (eg nsaids, acetominophen) and life style interventions such as yoga, delroy chi, stretching, regular exercise, PT/OT and referral to specialty care like Pain Management that specialize in pain treatment when appropriate.   I also included a review of serious risks of opioid use and substance use disorder.  I have included all of this in the after visit summary along with other risk factors identified that are pertinent to the patient today.      Follow Up:  Return in about 6 months (around 1/11/2026), or if symptoms worsen or fail to improve.     An After Visit Summary and PPPS with all of these plans were given to the patient.           ++++++++++++++++++++++++++++++++++++++++++++++++++++++++++++++++++   Additional E&M Note during same encounter follows:  Patient has multiple medical problems which are significant and separately identifiable that require additional work above and beyond the Medicare Wellness Visit.   Chief Complaint   Patient presents with    Medicare Wellness-subsequent    Hypertension    Anxiety    Hyperlipidemia       HPI  History of Present Illness      Review of Systems   Cardiovascular:  Negative for chest pain.   Respiratory:  Negative for shortness of breath.    Endocrine:        Hyperhidrosis   Gastrointestinal:  Positive for blood in stool. Negative for abdominal pain.   Neurological:  Positive for tremors.       Social History     Tobacco Use    Smoking status: Never     Passive exposure: Never    Smokeless tobacco: Never   Substance Use Topics    Alcohol use: No     O:   Vitals:    07/11/25 1338   BP: 104/66   Pulse: 97   SpO2: 99%   Weight: 67.1 kg (148 lb)   Height: 152.4 cm (60\")   PainSc: 2    PainLoc: Back     Body mass index is 28.9 kg/m².  Vitals and nursing note " reviewed.   Constitutional:       Appearance: Well-developed.   HENT:      Head: Normocephalic and atraumatic.   Neck:      Thyroid: No thyromegaly.      Vascular: No JVD.   Pulmonary:      Effort: Pulmonary effort is normal. No respiratory distress.      Breath sounds: Normal breath sounds. No wheezing. No rales.   Cardiovascular:      Normal rate. Regular rhythm. Normal heart sounds.      No gallop.  No friction rub.   Abdominal:      General: Bowel sounds are normal. There is no distension.      Palpations: Abdomen is soft.      Tenderness: There is no abdominal tenderness. There is no guarding or rebound.   Musculoskeletal:      Cervical back: Neck supple. Skin:     General: Skin is warm and dry.   Neurological:      Mental Status: Alert.      Motor: Tremor present.      Gait: Gait normal.      Comments: No cog wheel rigidity.    Psychiatric:         Behavior: Behavior normal.       Results       Diagnoses and all orders for this visit:    1. Medicare annual wellness visit, subsequent (Primary)    2. Primary hypertension  -     Comprehensive Metabolic Panel    3. Dyslipidemia  -     Comprehensive Metabolic Panel  -     Lipid Panel    4. Ulcerative colitis without complications, unspecified location  -     CBC (No Diff)    5. Need for pneumococcal vaccination  -     Pneumococcal Conjugate Vaccine 21 (18+ yrs)    6. Hyperhidrosis  -     oxybutynin XL (Ditropan XL) 10 MG 24 hr tablet; Take 1 tablet by mouth Daily As Needed (excessive sweating).  Dispense: 30 tablet; Refill: 5    7. Panic disorder without agoraphobia  -     LORazepam (ATIVAN) 0.5 MG tablet; 1/2 to 1 po daily prn panic attack, use sparingly  Dispense: 10 tablet; Refill: 2    8. Tremors of nervous system      Assessment & Plan    Return in about 6 months (around 1/11/2026), or if symptoms worsen or fail to improve.      _____________________________________  Ras Mcconnell DO, MS    Current Outpatient Medications on File Prior to Visit    Medication Sig Dispense Refill    adalimumab (HUMIRA) 40 MG/0.8ML Prefilled Syringe Kit injection Inject 0.8 mL under the skin into the appropriate area as directed Every 14 (Fourteen) Days. 2 each 5    calcium citrate-vitamin d (CITRACAL) 200-250 MG-UNIT tablet tablet Take  by mouth Daily.      ezetimibe (ZETIA) 10 MG tablet TAKE 1 TABLET BY MOUTH DAILY 90 tablet 1    fluticasone (FLONASE) 50 MCG/ACT nasal spray 2 sprays into the nostril(s) as directed by provider Daily. 16 g 5    lisinopril (PRINIVIL,ZESTRIL) 10 MG tablet Take 1 tablet by mouth Daily. 90 tablet 0    Nutritional Supplements (Ensure Original) liquid Take 1 can by mouth 2 (Two) Times a Day. 62699 mL 12    omeprazole (priLOSEC) 40 MG capsule TAKE 1 CAPSULE BY MOUTH TWICE DAILY 180 capsule 2    pravastatin (PRAVACHOL) 80 MG tablet Take 1 tablet by mouth Every Night. PLEASE CALL THE OFFICE FOR AN APPT 30 tablet 0    Qbrexza 2.4 % pads APPLY 1 PAD TO THE AFFECTED AREA DAILY AS NEEDED FOR HYPERHIDROSIS 30 each 5    tiZANidine (ZANAFLEX) 4 MG tablet TAKE 1 TABLET BY MOUTH EVERY 8 HOURS AS NEEDED FOR MUSCLE SPASMS 90 tablet 5    traZODone (DESYREL) 50 MG tablet Take 0.5 tablets by mouth every night at bedtime. 15 tablet 5    venlafaxine XR (EFFEXOR-XR) 150 MG 24 hr capsule TAKE 1 CAPSULE BY MOUTH DAILY 90 capsule 0    venlafaxine XR (EFFEXOR-XR) 75 MG 24 hr capsule TAKE ONE CAPSULE BY MOUTH EVERY DAY ALONG WITH 150 MG 90 capsule 1    carbamide peroxide (Debrox) 6.5 % otic solution Administer 5 drops into both ears 2 (Two) Times a Day. (Patient not taking: Reported on 7/11/2025) 22.5 mL 2    [DISCONTINUED] azithromycin (ZITHROMAX) 250 MG tablet Take first 2 tablets together, then 1 every day until finished. (Patient not taking: Reported on 7/11/2025) 6 tablet 0    [DISCONTINUED] benzonatate (TESSALON) 200 MG capsule Take 1 capsule by mouth 3 (Three) Times a Day As Needed for Cough. (Patient not taking: Reported on 7/11/2025) 45 capsule 0    [DISCONTINUED]  Chlorcyclizine-Pseudoephed 25-60 MG tablet 1 po tid prn congestion (Patient not taking: Reported on 7/11/2025) 42 tablet 0    [DISCONTINUED] methylPREDNISolone (MEDROL) 4 MG dose pack Take as directed on package instructions. (Patient not taking: Reported on 7/11/2025) 21 tablet 0    [DISCONTINUED] promethazine-dextromethorphan (PROMETHAZINE-DM) 6.25-15 MG/5ML syrup Take 5 mL by mouth 4 (Four) Times a Day As Needed for Cough. (Patient not taking: Reported on 7/11/2025) 180 mL 0     No current facility-administered medications on file prior to visit.

## 2025-07-17 LAB
ALBUMIN SERPL-MCNC: 4.1 G/DL (ref 3.5–5.2)
ALBUMIN/GLOB SERPL: 1.1 G/DL
ALP SERPL-CCNC: 102 U/L (ref 39–117)
ALT SERPL-CCNC: 23 U/L (ref 1–33)
AST SERPL-CCNC: 24 U/L (ref 1–32)
BILIRUB SERPL-MCNC: 0.3 MG/DL (ref 0–1.2)
BUN SERPL-MCNC: 15 MG/DL (ref 8–23)
BUN/CREAT SERPL: 18.1 (ref 7–25)
CALCIUM SERPL-MCNC: 9.6 MG/DL (ref 8.6–10.5)
CHLORIDE SERPL-SCNC: 103 MMOL/L (ref 98–107)
CHOLEST SERPL-MCNC: 279 MG/DL (ref 0–200)
CO2 SERPL-SCNC: 28.3 MMOL/L (ref 22–29)
CREAT SERPL-MCNC: 0.83 MG/DL (ref 0.57–1)
EGFRCR SERPLBLD CKD-EPI 2021: 78.3 ML/MIN/1.73
ERYTHROCYTE [DISTWIDTH] IN BLOOD BY AUTOMATED COUNT: 13.4 % (ref 12.3–15.4)
GLOBULIN SER CALC-MCNC: 3.8 GM/DL
GLUCOSE SERPL-MCNC: 121 MG/DL (ref 65–99)
HCT VFR BLD AUTO: 41.8 % (ref 34–46.6)
HDLC SERPL-MCNC: 55 MG/DL (ref 40–60)
HGB BLD-MCNC: 13 G/DL (ref 12–15.9)
LDLC SERPL CALC-MCNC: 172 MG/DL (ref 0–100)
MCH RBC QN AUTO: 28.1 PG (ref 26.6–33)
MCHC RBC AUTO-ENTMCNC: 31.1 G/DL (ref 31.5–35.7)
MCV RBC AUTO: 90.5 FL (ref 79–97)
PLATELET # BLD AUTO: 364 10*3/MM3 (ref 140–450)
POTASSIUM SERPL-SCNC: 4.8 MMOL/L (ref 3.5–5.2)
PROT SERPL-MCNC: 7.9 G/DL (ref 6–8.5)
RBC # BLD AUTO: 4.62 10*6/MM3 (ref 3.77–5.28)
SODIUM SERPL-SCNC: 140 MMOL/L (ref 136–145)
TRIGL SERPL-MCNC: 276 MG/DL (ref 0–150)
VLDLC SERPL CALC-MCNC: 52 MG/DL (ref 5–40)
WBC # BLD AUTO: 7.8 10*3/MM3 (ref 3.4–10.8)

## 2025-07-20 ENCOUNTER — RESULTS FOLLOW-UP (OUTPATIENT)
Dept: FAMILY MEDICINE CLINIC | Facility: CLINIC | Age: 65
End: 2025-07-20
Payer: MEDICARE

## 2025-07-20 NOTE — PROGRESS NOTES
Mail copy of results to patient.  Triglycerides have improved, continue work on diet  Cholesterol mild improvement, continue work on diet and continue zetia/pravastatin  Liver enzymes normalized  Kidney function normal

## 2025-07-20 NOTE — LETTER
Marizol Park  [address] [facility] [facility] [address]    July 20, 2025     Dear Ms. Park:    Below are the results from your recent visit:    Triglycerides have improved, continue work on diet  Cholesterol mild improvement, continue work on diet and continue zetia/pravastatin  Liver enzymes normalized  Kidney function normal    Resulted Orders   Comprehensive Metabolic Panel   Result Value Ref Range    Glucose 121 (H) 65 - 99 mg/dL    BUN 15.0 8.0 - 23.0 mg/dL    Creatinine 0.83 0.57 - 1.00 mg/dL    EGFR Result 78.3 >60.0 mL/min/1.73      Comment:      GFR Categories in Chronic Kidney Disease (CKD)  GFR Category          GFR (mL/min/1.73)    Interpretation  G1                    90 or greater        Normal or high  (1)  G2                    60-89                Mild decrease  (1)  G3a                   45-59                Mild to moderate  decrease  G3b                   30-44                Moderate to  severe decrease  G4                    15-29                Severe decrease  G5                    14 or less           Kidney failure  (1)In the absence of evidence of kidney disease, neither  GFR category G1 or G2 fulfill the criteria for CKD.  eGFR calculation 2021 CKD-EPI creatinine equation, which  does not include race as a factor      BUN/Creatinine Ratio 18.1 7.0 - 25.0    Sodium 140 136 - 145 mmol/L    Potassium 4.8 3.5 - 5.2 mmol/L    Chloride 103 98 - 107 mmol/L    Total CO2 28.3 22.0 - 29.0 mmol/L    Calcium 9.6 8.6 - 10.5 mg/dL    Total Protein 7.9 6.0 - 8.5 g/dL    Albumin 4.1 3.5 - 5.2 g/dL    Globulin 3.8 gm/dL    A/G Ratio 1.1 g/dL    Total Bilirubin 0.3 0.0 - 1.2 mg/dL    Alkaline Phosphatase 102 39 - 117 U/L    AST (SGOT) 24 1 - 32 U/L    ALT (SGPT) 23 1 - 33 U/L   Lipid Panel   Result Value Ref Range    Total Cholesterol 279 (H) 0 - 200 mg/dL      Comment:      Cholesterol Reference Ranges  (U.S. Department of Health and Human Services ATP III  Classifications)  Desirable          <200  mg/dL  Borderline High    200-239 mg/dL  High Risk          >240 mg/dL  Triglyceride Reference Ranges  (U.S. Department of Health and Human Services ATP III  Classifications)  Normal           <150 mg/dL  Borderline High  150-199 mg/dL  High             200-499 mg/dL  Very High        >500 mg/dL  HDL Reference Ranges  (U.S. Department of Health and Human Services ATP III  Classifications)  Low     <40 mg/dl (major risk factor for CHD)  High    >60 mg/dl ('negative' risk factor for CHD)  LDL Reference Ranges  (U.S. Department of Health and Human Services ATP III  Classifications)  Optimal          <100 mg/dL  Near Optimal     100-129 mg/dL  Borderline High  130-159 mg/dL  High             160-189 mg/dL  Very High        >189 mg/dL  LDL is calculated using the NIH LDL-C calculation.      Triglycerides 276 (H) 0 - 150 mg/dL    HDL Cholesterol 55 40 - 60 mg/dL    VLDL Cholesterol Chacho 52 (H) 5 - 40 mg/dL    LDL Chol Calc ([facility]) 172 (H) 0 - 100 mg/dL   CBC (No Diff)   Result Value Ref Range    WBC 7.80 3.40 - 10.80 10*3/mm3    RBC 4.62 3.77 - 5.28 10*6/mm3    Hemoglobin 13.0 12.0 - 15.9 g/dL    Hematocrit 41.8 34.0 - 46.6 %    MCV 90.5 79.0 - 97.0 fL    MCH 28.1 26.6 - 33.0 pg    MCHC 31.1 (L) 31.5 - 35.7 g/dL    RDW 13.4 12.3 - 15.4 %    Platelets 364 140 - 450 10*3/mm3   If you have any questions or concerns, please don't hesitate to call.  Sincerely,  Ras Mcconnell, DO

## 2025-08-20 RX ORDER — PRAVASTATIN SODIUM 80 MG/1
80 TABLET ORAL NIGHTLY
Qty: 30 TABLET | Refills: 3 | Status: SHIPPED | OUTPATIENT
Start: 2025-08-20

## (undated) DEVICE — THE TORRENT IRRIGATION SCOPE CONNECTOR IS USED WITH THE TORRENT IRRIGATION TUBING TO PROVIDE IRRIGATION FLUIDS SUCH AS STERILE WATER DURING GASTROINTESTINAL ENDOSCOPIC PROCEDURES WHEN USED IN CONJUNCTION WITH AN IRRIGATION PUMP (OR ELECTROSURGICAL UNIT).: Brand: TORRENT

## (undated) DEVICE — LN SMPL CO2 SHTRM SD STREAM W/M LUER

## (undated) DEVICE — KT ORCA ORCAPOD DISP STRL

## (undated) DEVICE — CANN NASL CO2 TRULINK W/O2 A/

## (undated) DEVICE — Device: Brand: DEFENDO AIR/WATER/SUCTION AND BIOPSY VALVE

## (undated) DEVICE — TUBING, SUCTION, 1/4" X 10', STRAIGHT: Brand: MEDLINE

## (undated) DEVICE — SINGLE-USE BIOPSY FORCEPS: Brand: RADIAL JAW 4

## (undated) DEVICE — SENSR O2 OXIMAX FNGR A/ 18IN NONSTR

## (undated) DEVICE — BITEBLOCK OMNI BLOC

## (undated) DEVICE — CANNULA,ADULT,SOFT-TOUCH,7'TUBE,UC: Brand: PENDING

## (undated) DEVICE — ADAPT CLN BIOGUARD AIR/H2O DISP

## (undated) DEVICE — TBG 02 CRUSH RESIST LF CLR 7FT

## (undated) DEVICE — CANN O2 ETCO2 FITS ALL CONN CO2 SMPL A/ 7IN DISP LF

## (undated) DEVICE — RESUSCITATOR,MANUAL,ADLT,MASK,TUBE RES: Brand: MEDLINE INDUSTRIES, INC.

## (undated) DEVICE — BLCK/BITE BLOX W/DENTL/RIM W/STRAP 54F

## (undated) DEVICE — TRAP,MUCUS SPECIMEN, 80CC: Brand: MEDLINE

## (undated) DEVICE — FRCP BX RADJAW4 NDL 2.8 240CM LG OG BX40